# Patient Record
Sex: FEMALE | Race: WHITE | NOT HISPANIC OR LATINO | URBAN - METROPOLITAN AREA
[De-identification: names, ages, dates, MRNs, and addresses within clinical notes are randomized per-mention and may not be internally consistent; named-entity substitution may affect disease eponyms.]

---

## 2023-05-05 ENCOUNTER — TELEPHONE (OUTPATIENT)
Dept: OBGYN CLINIC | Facility: CLINIC | Age: 38
End: 2023-05-05

## 2023-05-05 NOTE — TELEPHONE ENCOUNTER
Patient called in she is going to be a new patient and was scheduled for abd pain due to h/o Endometriosis and ovarian cyst 05/08/23 w/ Michelle Falcon  However, she just took a HPT today and it came up Positive  She unsure her LMP is as her cycles been very irregular  She believes it was around 04/07/2023 (should be roughly around 4 wks)  She been having severe right sided pain and on the pain score she states it would be a 6 out of 10 at its worst  Denies vaginal spotting/bleeding  Advised her she need to be seen in the office today for further evaluation or the nearest ER  Pt declined as she can not come in today due to working in the [facility] [employer], and she does not want to go the ER (believe the pain is not severe to be seen there)  Encourage her to be seen in the ER as any unilateral pelvic pain can be indicator of possible Ectopic Pregnancy  She still declined and request an appointment on Monday as she will be available to be seen and aware that at that appointment can not guarantee viability if early in her pregnancy  Once again advised her that if she develop severe pelvic pain, and/or spotting/bleeding to please proceed to the nearest ER immediately  She verbalized understanding  Transferred her to  to schedule

## 2023-05-08 ENCOUNTER — OFFICE VISIT (OUTPATIENT)
Dept: OBGYN CLINIC | Facility: CLINIC | Age: 38
End: 2023-05-08

## 2023-05-08 VITALS
HEIGHT: 61 IN | WEIGHT: 165.2 LBS | DIASTOLIC BLOOD PRESSURE: 88 MMHG | BODY MASS INDEX: 31.19 KG/M2 | SYSTOLIC BLOOD PRESSURE: 132 MMHG

## 2023-05-08 DIAGNOSIS — N91.2 AMENORRHEA: Primary | ICD-10-CM

## 2023-05-08 DIAGNOSIS — R10.2 PELVIC PAIN: ICD-10-CM

## 2023-05-08 DIAGNOSIS — Z32.01 PREGNANCY TEST-POSITIVE: ICD-10-CM

## 2023-05-08 LAB — SL AMB POCT URINE HCG: ABNORMAL

## 2023-05-08 RX ORDER — ESOMEPRAZOLE MAGNESIUM 20 MG/1
FOR SUSPENSION ORAL
COMMUNITY

## 2023-05-08 NOTE — PROGRESS NOTES
Assessment/Plan:      Diagnoses and all orders for this visit:    Amenorrhea     -  Patient's menstrual history and lab results reviewed  Pregnancy test-positive  -     POCT urine HCG    Pelvic pain      -  Informed patient of normal findings on the U/S  Findings c/w IUP with EGA 6w5d  Advised patient to start PNV and schedule 1st PN appointment    Other orders  -     esomeprazole (NexIUM) 20 mg packet      FIRST TRIMESTER OBSTETRIC ULTRASOUND  Date of study: 5/8/2023  Performed by: Caesar Gaxiola DO     INDICATION: Amenorrhea, viability    COMPARISON: None  TECHNIQUE:   Transvaginal imaging was performed to assess the gestation, myometrial/endometrial architecture and ovarian parenchymal detail  The study includes volumetric sweeps and traditional still imaging technique  FINDINGS:     A single intrauterine gestation is identified  Cardiac activity is detected  YOLK SAC:  Present and normal in size and appearance  MEAN CROWN RUMP LENGTH:  8 4 mm = 6 weeks 5 days   AMNIOTIC FLUID/SAC SHAPE:  Within expected normal range  UTERUS/ADNEXA:   No adnexal mass or pathologic cyst   No free fluid identified  IMPRESSION:    Patient's last menstrual period was 04/04/2023 (approximate)  Gestational age based on LMP: 2w1d  Gestational age based on US: 11w9d    Will assign dating based on TVUS   Final Gestational age: 11w9d  Final Estimated Date of Delivery: 12/27/23  Fetal cardiac activity detected  No adnexal masses seen  Caesar Gaxiola DO  5/8/2023 2:20 PM       Subjective:     Patient ID: Joelle Asher is a 42 y o  female  Patient presents with partner with history of RLQ pain  Patient states she had beta HCG done 5/5/523 and RLQ pain has been intermittent since then  Denies having any vaginal bleeding  LMP was 4/4/23      Review of Systems   Constitutional: Negative for activity change and unexpected weight change     Genitourinary: Negative for dyspareunia, dysuria, vaginal bleeding, vaginal discharge and vaginal pain  Objective:     Physical Exam  Vitals and nursing note reviewed  HENT:      Head: Normocephalic  Abdominal:      General: There is no distension  Palpations: Abdomen is soft  There is no mass  Tenderness: There is no abdominal tenderness  There is no rebound  Genitourinary:     General: Normal vulva  Exam position: Lithotomy position  Labia:         Right: No rash, tenderness or lesion  Left: No rash, tenderness or lesion  Urethra: No urethral pain or urethral lesion  Vagina: Normal  No vaginal discharge  Cervix: No cervical motion tenderness, lesion or erythema  Uterus: Normal        Adnexa: Right adnexa normal and left adnexa normal       Rectum: No external hemorrhoid  Musculoskeletal:      Right lower leg: No edema  Left lower leg: No edema  Skin:     General: Skin is warm  Neurological:      Mental Status: She is alert and oriented to person, place, and time  Psychiatric:         Mood and Affect: Mood normal          Behavior: Behavior normal          Thought Content:  Thought content normal

## 2023-05-23 ENCOUNTER — PATIENT OUTREACH (OUTPATIENT)
Dept: OBGYN CLINIC | Facility: CLINIC | Age: 38
End: 2023-05-23

## 2023-05-23 ENCOUNTER — INITIAL PRENATAL (OUTPATIENT)
Dept: OBGYN CLINIC | Facility: CLINIC | Age: 38
End: 2023-05-23

## 2023-05-23 ENCOUNTER — INITIAL PRENATAL (OUTPATIENT)
Dept: OBGYN CLINIC | Facility: CLINIC | Age: 38
End: 2023-05-23
Payer: COMMERCIAL

## 2023-05-23 VITALS
SYSTOLIC BLOOD PRESSURE: 124 MMHG | HEIGHT: 61 IN | WEIGHT: 168 LBS | DIASTOLIC BLOOD PRESSURE: 72 MMHG | BODY MASS INDEX: 31.72 KG/M2

## 2023-05-23 DIAGNOSIS — Z36.89 ENCOUNTER FOR OTHER SPECIFIED ANTENATAL SCREENING: Primary | ICD-10-CM

## 2023-05-23 DIAGNOSIS — O99.210 OBESITY IN PREGNANCY: ICD-10-CM

## 2023-05-23 DIAGNOSIS — A60.09: Primary | ICD-10-CM

## 2023-05-23 DIAGNOSIS — O09.521 MULTIGRAVIDA OF ADVANCED MATERNAL AGE IN FIRST TRIMESTER: ICD-10-CM

## 2023-05-23 DIAGNOSIS — O98.311: Primary | ICD-10-CM

## 2023-05-23 DIAGNOSIS — Z13.71 TESTING FOR GENETIC DISEASE CARRIER STATUS: ICD-10-CM

## 2023-05-23 DIAGNOSIS — Z31.430 ENCOUNTER OF FEMALE FOR TESTING FOR GENETIC DISEASE CARRIER STATUS FOR PROCREATIVE MANAGEMENT: ICD-10-CM

## 2023-05-23 DIAGNOSIS — Z36.87 UNSURE OF LMP (LAST MENSTRUAL PERIOD) AS REASON FOR ULTRASOUND SCAN: ICD-10-CM

## 2023-05-23 DIAGNOSIS — Z34.91 PRENATAL CARE, FIRST TRIMESTER: ICD-10-CM

## 2023-05-23 PROBLEM — K21.9 GASTROESOPHAGEAL REFLUX IN PREGNANCY IN FIRST TRIMESTER: Status: ACTIVE | Noted: 2023-05-23

## 2023-05-23 PROBLEM — O99.611 GASTROESOPHAGEAL REFLUX IN PREGNANCY IN FIRST TRIMESTER: Status: ACTIVE | Noted: 2023-05-23

## 2023-05-23 LAB
EXTERNAL CHLAMYDIA SCREEN: NEGATIVE
EXTERNAL GONORRHEA SCREEN: NEGATIVE
SL AMB  POCT GLUCOSE, UA: NEGATIVE
SL AMB POCT URINE PROTEIN: NEGATIVE

## 2023-05-23 RX ORDER — CALCIUM CARBONATE 500 MG/1
1 TABLET, CHEWABLE ORAL DAILY
COMMUNITY

## 2023-05-23 NOTE — PROGRESS NOTES
OB INTAKE INTERVIEW  Patient is 42 y  o who presents for OB intake at 7wks  She is accompanied by: significant   The father of her baby Chanda Perez) is involved in the pregnancy    Last Menstrual Period: 2023  Ultrasound: Measured 9 weeks 1 days on 2023  Estimated Date of Delivery: 2023 confirmed by ultrasound  Signs/Symptoms of Pregnancy  Current pregnancy symptoms: nausea, vomiting, increased fatigue, breast tenderness   Constipation YES  Headaches YES  Cramping/spotting YES, cramping, faint spotting, cramping on right side   PICA cravings no    Diabetes-  Body mass index is 31 74 kg/m²  If patient has 1 or more, please order early 1 hour GTT  History of GDM no  BMI >30 YES  History of PCOS or current metformin use no  History of LGA/macrosomic infant (4000g/9lbs) no    If patient has 2 or more, please order early 1 hour GTT  AMA YES  First degree relative with type 2 diabetes no  History of chronic HTN, hyperlipidemia, elevated A1C no  High risk race (, , ,  or ) no    Hypertension- if you answer yes, please order preeclampsia labs (cbc, comprehensive metabolic panel, urine protein creatinine ratio, uric acid)  History of of chronic HTN no  History of gestational HTN no  History of preeclampsia, eclampsia, or HELLP syndrome no  History of diabetes no  History of lupus, autoimmune disease, kidney disease, antiphospholipid syndrome, rheumatoid arthritis, sjogren's syndrome no    Thyroid- if yes order TSH with reflex T4 (Unless TSH value on file within last 4 weeks then do not need to order)  History of thyroid disease no    Bleeding Disorder or Hx of DVT-patient or first degree relative with history of  Order the following if not done previously     (Factor V, antithrombin III, prothrombin gene mutation, protein C and S Ag, lupus anticoagulant, anticardiolipin, beta-2 glycoprotein)   no    OB/GYN-  History of abnormal pap smear YES  History of HPV no  History of Herpes/HSV YES  History of other STI (gonorrhea, chlamydia, trich) (or current partner with Hep B, Hep C, or HIV) no  History of prior  no  History of prior  no  History of  delivery prior to 36 weeks 6 days no  History of blood transfusion no  Ok for blood transfusion YES    Substance screening-   History of tobacco use no  Currently using tobacco no  Currently using alcohol no  Presently using drugs no  Past drug use (if yes, order urine drug screening panel)  no  IV drug use (if yes, order urine drug screening panel) no  Partner drug use (if yes, order urine drug screening panel) no  Parent/Family drug use (do not order urine drug screening panel just for family hx) no    Depression Screening-  PHQ-9 Score: (0)    MRSA Screening-   Does the pt have a hx of MRSA? no  If yes- please follow MRSA protocol and obtain a nasal swab for MRSA culture at 12 week visit  Immunizations:  Influenza vaccine given this season (ask date) 2022  Discussed Tdap vaccine (ask date) YES  Discussed COVID Vaccine (request pt upload card or bring to next visit) YES, covid 2022    Genetic/M-  Do you or your partner have a history of any of the following in yourselves or first degree relatives? Cystic fibrosis NO  Spinal muscular atrophy no  Hemoglobinopathy/Sickle Cell/Thalassemia no  Fragile X Intellectual Disability no    If yes, discuss carrier screening and recommend consultation with [facility]/genetic counseling  If no, discuss option for carrier screening and/or genetic testing with [facility]  Patient interested YES, patient is requesting cystic fibrosis, muscular atrophy testing  Appointment at [facility] made NO, patient will call to schedule appointment     Interview education  [facility] Pregnancy Essentials Book reviewed and discussed YES      Prenatal lab work scripts YES    Extra labs ordered:  Early 1 hr gtt       The patient has a history now or in prior pregnancy notable for: GERD, IBS, Endometriosis and cysts, genital Herpes 1      Details that I feel the provider should be aware of: GERD, IBS, Endometriosis and cysts, genital Herpes 1    The patient was oriented to our practice, reviewed delivering physicians and [facility]  All questions were answered      Interviewed by: Swati Grant

## 2023-05-23 NOTE — PROGRESS NOTES
"SW referred for initial prenatal assessment  Patient is Verónica Edmonds  SW met with patient and FOB Mathieu Pierce who were agreeable to completing assessment today  Patient reports this pregnancy was a \"planned surprise\" - due to h/o endometriosis, patient was unsure if she was able to get pregnant  Both patient and FOB are very excited to becoming parents  Patient and FOB recently moved to the area from [place] - bought a house in [place]  They have not told all of their family about the baby yet - planning to wait until patient's 38th birthday celebration to announce  Family/friends they have told already are supportive  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[place] and FOB works for [employer]  FOB is main  - patient working on obtaining her license  Patient states her relationship with FOB is good and she is well supported by his family, her family, and friends  Patient states her mood as been well mostly - does feel anxious about experiencing depression/anxiety during pregnancy d/t hormones and patient's history of mental health  Patient reports she has an OOP therapist, Carmen Zavala, who she plans to get back in touch with to schedule an appointment  Patient previously saw Veda 1x a month, but has been managing well recently so have been communicating via e-mail/text mostly  Denies SI/HI  Has not been on medication for several years - feels it is not needed at this time  Patient reports she enjoys talking things out with FOB, spending time with her dogs, turning off her phone and gardening when stressed  Denies substance use and legal concerns  Patient reports being in foster care/adopted at a very young age (involved with [employer]) - denies current or history of DV/IPV  Also denies CYS involvement   Patient and FOB expressed interest in some parenting education/resources - SW sent information from [facility]'s Parent/Infant Resource List, and [employer]'s " flyer to both patient and FOB ([email or web address]) via e-mail  No additional needs identified at this time; SW provided patient with contact information for future reference  SW closing consult, please re-refer as needed

## 2023-05-24 LAB
C TRACH RRNA SPEC QL NAA+PROBE: NOT DETECTED
N GONORRHOEA RRNA SPEC QL NAA+PROBE: NOT DETECTED

## 2023-05-30 ENCOUNTER — TELEPHONE (OUTPATIENT)
Dept: OBGYN CLINIC | Facility: CLINIC | Age: 38
End: 2023-05-30

## 2023-05-30 NOTE — TELEPHONE ENCOUNTER
Jonny Powell called asking if our office offers a different version of the 1hr glucose available at [address] [place] [address]  It is supposed to be a more natural way of screening for diabetes and does not cause the same side effect of glucola  Advised this office typically does not order this test but will forward to provider to review and obtain recommendation    Dr Yesika Stock, please advise

## 2023-05-31 ENCOUNTER — TELEPHONE (OUTPATIENT)
Dept: OBGYN CLINIC | Facility: CLINIC | Age: 38
End: 2023-05-31

## 2023-05-31 ENCOUNTER — TELEPHONE (OUTPATIENT)
Facility: HOSPITAL | Age: 38
End: 2023-05-31

## 2023-05-31 NOTE — TELEPHONE ENCOUNTER
Spoke with PT 5/31 regarding her 6/26 appt  PT wanted to r/s appt to a different day, but we are unable to get her in another day  We moved the appt to a later time in the same day and PT verbalized understanding  PT also asked if she needed to have her glucose test done prior to the appt and I told her she did not have to  We recommend having it done prior to seeing MFM, But if she is unable then we will still see her for her appt as normal  PT verbalized understanding of all information and was very thankful

## 2023-05-31 NOTE — TELEPHONE ENCOUNTER
Pt's significant other Adwoa Nieto called to question timing 1 hr gtt needs to be done  Advised we like to have testing done within 2 weeks after initial PN visit but definitely would like to have done prior to next OB appointment on  6/26/23  Pt is leaving to go out of town from 6/9/23-6/25/23  Patient has an appointment on 6/9/23 to have her First PN blood work drawn however the lab may not be able to schedule gtt  Adwoa Nieto will confirm if testing can be done on 6/9/23 other wise will have done upon return from vacation

## 2023-06-05 ENCOUNTER — TELEPHONE (OUTPATIENT)
Dept: OBGYN CLINIC | Facility: CLINIC | Age: 38
End: 2023-06-05

## 2023-06-05 NOTE — TELEPHONE ENCOUNTER
iJn Zuñiga left a message that Jesica Collazo is c/o increased right side abdominal pain,starting below ribs going to her crotch/hips  He said the pain worsen with coughing & moving  Jesica Collazo is 9 weeks  Left a message on Toshia's voice mail to call back

## 2023-06-05 NOTE — TELEPHONE ENCOUNTER
ΣΑΡΑΝΤΙ states having more right side flank pain;pain waxes & wanes  Denies any vaginal bleeding or hematuria  She said did have work up for Gallbladder just recently;results negative for gall stones  Informed pt to follow up with her PCP,may have kidney stones

## 2023-06-07 LAB
EXTERNAL ANTIBODY SCREEN: NORMAL
EXTERNAL HEMOGLOBIN: 13.4 G/DL
EXTERNAL HEPATITIS B SURFACE ANTIGEN: NEGATIVE
EXTERNAL HIV-1/2 AB-AG: NORMAL
EXTERNAL PLATELET COUNT: 219 K/ÂΜL
EXTERNAL RH FACTOR: POSITIVE
EXTERNAL RUBELLA IGG QUANTITATION: NORMAL
EXTERNAL SYPHILIS TOTAL IGG/IGM SCREENING: NORMAL

## 2023-06-08 LAB
ABO GROUP BLD: NORMAL
APPEARANCE UR: CLEAR
BACTERIA UR QL AUTO: NORMAL /HPF
BASOPHILS # BLD AUTO: 22 CELLS/UL (ref 0–200)
BASOPHILS NFR BLD AUTO: 0.3 %
BILIRUB UR QL STRIP: NEGATIVE
BLD GP AB SCN SERPL QL: NORMAL
COLOR UR: YELLOW
EOSINOPHIL # BLD AUTO: 37 CELLS/UL (ref 15–500)
EOSINOPHIL NFR BLD AUTO: 0.5 %
ERYTHROCYTE [DISTWIDTH] IN BLOOD BY AUTOMATED COUNT: 12.9 % (ref 11–15)
GLUCOSE UR QL STRIP: NEGATIVE
HBV SURFACE AG SERPL QL IA: NORMAL
HCT VFR BLD AUTO: 38.8 % (ref 35–45)
HCV AB S/CO SERPL IA: 0.03
HCV AB SERPL QL IA: NORMAL
HGB BLD-MCNC: 13.4 G/DL (ref 11.7–15.5)
HGB UR QL STRIP: NEGATIVE
HIV 1+2 AB+HIV1 P24 AG SERPL QL IA: NORMAL
HYALINE CASTS #/AREA URNS LPF: NORMAL /LPF
KETONES UR QL STRIP: NEGATIVE
LEUKOCYTE ESTERASE UR QL STRIP: NEGATIVE
LYMPHOCYTES # BLD AUTO: 1256 CELLS/UL (ref 850–3900)
LYMPHOCYTES NFR BLD AUTO: 17.2 %
MCH RBC QN AUTO: 30.2 PG (ref 27–33)
MCHC RBC AUTO-ENTMCNC: 34.5 G/DL (ref 32–36)
MCV RBC AUTO: 87.4 FL (ref 80–100)
MONOCYTES # BLD AUTO: 650 CELLS/UL (ref 200–950)
MONOCYTES NFR BLD AUTO: 8.9 %
NEUTROPHILS # BLD AUTO: 5336 CELLS/UL (ref 1500–7800)
NEUTROPHILS NFR BLD AUTO: 73.1 %
NITRITE UR QL STRIP: NEGATIVE
PH UR STRIP: 6.5 [PH] (ref 5–8)
PLATELET # BLD AUTO: 219 THOUSAND/UL (ref 140–400)
PMV BLD REES-ECKER: 11.1 FL (ref 7.5–12.5)
PROT UR QL STRIP: NEGATIVE
RBC # BLD AUTO: 4.44 MILLION/UL (ref 3.8–5.1)
RBC #/AREA URNS HPF: NORMAL /HPF
RH BLD: NORMAL
RPR SER QL: NORMAL
RUBV IGG SERPL IA-ACNC: 5.66 INDEX
SP GR UR STRIP: 1 (ref 1–1.03)
SQUAMOUS #/AREA URNS HPF: NORMAL /HPF
WBC # BLD AUTO: 7.3 THOUSAND/UL (ref 3.8–10.8)
WBC #/AREA URNS HPF: NORMAL /HPF

## 2023-06-13 LAB
ABO GROUP BLD: NORMAL
APPEARANCE UR: CLEAR
BACTERIA UR QL AUTO: NORMAL /HPF
BASOPHILS # BLD AUTO: 22 CELLS/UL (ref 0–200)
BASOPHILS NFR BLD AUTO: 0.3 %
BILIRUB UR QL STRIP: NEGATIVE
BLD GP AB SCN SERPL QL: NORMAL
COLOR UR: YELLOW
EOSINOPHIL # BLD AUTO: 37 CELLS/UL (ref 15–500)
EOSINOPHIL NFR BLD AUTO: 0.5 %
ERYTHROCYTE [DISTWIDTH] IN BLOOD BY AUTOMATED COUNT: 12.9 % (ref 11–15)
GLUCOSE UR QL STRIP: NEGATIVE
HBV SURFACE AG SERPL QL IA: NORMAL
HCT VFR BLD AUTO: 38.8 % (ref 35–45)
HCV AB S/CO SERPL IA: 0.03
HCV AB SERPL QL IA: NORMAL
HGB BLD-MCNC: 13.4 G/DL (ref 11.7–15.5)
HGB UR QL STRIP: NEGATIVE
HIV 1+2 AB+HIV1 P24 AG SERPL QL IA: NORMAL
HYALINE CASTS #/AREA URNS LPF: NORMAL /LPF
INTERPRETATION: NORMAL
KETONES UR QL STRIP: NEGATIVE
LEUKOCYTE ESTERASE UR QL STRIP: NEGATIVE
LYMPHOCYTES # BLD AUTO: 1256 CELLS/UL (ref 850–3900)
LYMPHOCYTES NFR BLD AUTO: 17.2 %
MCH RBC QN AUTO: 30.2 PG (ref 27–33)
MCHC RBC AUTO-ENTMCNC: 34.5 G/DL (ref 32–36)
MCV RBC AUTO: 87.4 FL (ref 80–100)
MONOCYTES # BLD AUTO: 650 CELLS/UL (ref 200–950)
MONOCYTES NFR BLD AUTO: 8.9 %
NEUTROPHILS # BLD AUTO: 5336 CELLS/UL (ref 1500–7800)
NEUTROPHILS NFR BLD AUTO: 73.1 %
NITRITE UR QL STRIP: NEGATIVE
PH UR STRIP: 6.5 [PH] (ref 5–8)
PLATELET # BLD AUTO: 219 THOUSAND/UL (ref 140–400)
PMV BLD REES-ECKER: 11.1 FL (ref 7.5–12.5)
PROT UR QL STRIP: NEGATIVE
RBC # BLD AUTO: 4.44 MILLION/UL (ref 3.8–5.1)
RBC #/AREA URNS HPF: NORMAL /HPF
RH BLD: NORMAL
RPR SER QL: NORMAL
RUBV IGG SERPL IA-ACNC: 5.66 INDEX
SMA 2+0 RISK VARIANT: NOT DETECTED
SMN1 GENE MUT ANL BLD/T: NORMAL
SP GR UR STRIP: 1 (ref 1–1.03)
SQUAMOUS #/AREA URNS HPF: NORMAL /HPF
TECHNICAL RESULTS: NEGATIVE
WBC # BLD AUTO: 7.3 THOUSAND/UL (ref 3.8–10.8)
WBC #/AREA URNS HPF: NORMAL /HPF

## 2023-06-26 ENCOUNTER — ROUTINE PRENATAL (OUTPATIENT)
Dept: PERINATAL CARE | Facility: OTHER | Age: 38
End: 2023-06-26
Payer: COMMERCIAL

## 2023-06-26 VITALS
HEIGHT: 61 IN | BODY MASS INDEX: 33.12 KG/M2 | DIASTOLIC BLOOD PRESSURE: 68 MMHG | SYSTOLIC BLOOD PRESSURE: 118 MMHG | HEART RATE: 91 BPM | WEIGHT: 175.4 LBS

## 2023-06-26 DIAGNOSIS — K21.9 GASTROESOPHAGEAL REFLUX IN PREGNANCY IN FIRST TRIMESTER: Primary | ICD-10-CM

## 2023-06-26 DIAGNOSIS — O99.210 OBESITY IN PREGNANCY: ICD-10-CM

## 2023-06-26 DIAGNOSIS — Z13.79 GENETIC SCREENING: ICD-10-CM

## 2023-06-26 DIAGNOSIS — Z3A.14 14 WEEKS GESTATION OF PREGNANCY: ICD-10-CM

## 2023-06-26 DIAGNOSIS — Z36.89 ENCOUNTER FOR OTHER SPECIFIED ANTENATAL SCREENING: ICD-10-CM

## 2023-06-26 DIAGNOSIS — O99.611 GASTROESOPHAGEAL REFLUX IN PREGNANCY IN FIRST TRIMESTER: Primary | ICD-10-CM

## 2023-06-26 PROCEDURE — 99243 OFF/OP CNSLTJ NEW/EST LOW 30: CPT | Performed by: OBSTETRICS & GYNECOLOGY

## 2023-06-26 PROCEDURE — 36415 COLL VENOUS BLD VENIPUNCTURE: CPT | Performed by: OBSTETRICS & GYNECOLOGY

## 2023-06-26 PROCEDURE — 76805 OB US >/= 14 WKS SNGL FETUS: CPT | Performed by: OBSTETRICS & GYNECOLOGY

## 2023-06-26 RX ORDER — ACETAMINOPHEN 325 MG/1
650 TABLET ORAL EVERY 6 HOURS PRN
COMMUNITY

## 2023-06-26 RX ORDER — FAMOTIDINE 10 MG
10 TABLET ORAL 2 TIMES DAILY
COMMUNITY

## 2023-06-26 NOTE — PROGRESS NOTES
A fetal ultrasound was completed  See Ob procedures in Epic for an interpretation and recommendations  Do not hesitate to contact us in [facility] if you have questions  Vishal Hogan MD, [address] [facility]  Maternal Fetal Medicine

## 2023-06-26 NOTE — LETTER
"June 26, 2023     [address]  [address] [address]  [place]    Patient: Alejandra Matthews   YOB: 1985   Date of Visit: 6/26/2023       Dear Dr Lupillo Leonard: Thank you for referring Alejandra Matthews to me for evaluation  Below are my notes for this consultation  If you have questions, please do not hesitate to call me  I look forward to following your patient along with you  Sincerely,        Fili Gutierrez MD        CC: No Recipients    Fili Gutierrez MD  6/26/2023  6:51 PM  Sign when Signing Visit  A fetal ultrasound was completed  See Ob procedures in Epic for an interpretation and recommendations  Do not hesitate to contact us in [facility] if you have questions  Whit Villalba MD, MSCE  Maternal Fetal Medicine      Ayo Cope  6/26/2023  6:31 PM  Sign when Signing Visit  Patient chose to have [facility] Non-invasive Prenatal Screen with fetal sex  Patient given brochure and is aware [facility] will contact their insurance and coordinate coverage  Patient made aware she will need to respond to text message or e-mail from [employer] within 2 business days or testing will be run through insurance  Patient informed text message will come from area code  \"415\"  Provided [employer] # [phone] and web site : [email or web address]  \"Deming your test online\" card with barcode and test tube ID provided to patient  Reviewed [facility]'s web site states 5-7 business days for results via their portal    [employer] message will be sent to patient when [facility] receives results /provider reviews  2 vials of blood drawn from left arm by Ayo Cope MA  Patient tolerated blood draw without difficulty  Specimens labeled with patient identifiers (name, date of birth, specimen collection date), order and specimen were verified with patient, packed and sent via [email or web address]  FED EX  tracking #  [id number]  Copy of lab order scanned to Epic media      Maternal " Fetal Medicine will have results in approximately 7-10 business days and will call patient or notify via [address]  Patient aware viewing lab result online will reveal fetal sex if ordered  Patient verbalized understanding of all instructions and no questions at this time

## 2023-06-26 NOTE — PROGRESS NOTES
"Patient chose to have [facility] Non-invasive Prenatal Screen with fetal sex  Patient given brochure and is aware [facility] will contact their insurance and coordinate coverage  Patient made aware she will need to respond to text message or e-mail from [employer] within 2 business days or testing will be run through insurance  Patient informed text message will come from area code  \"415\"  Provided [employer] # [phone] and web site : [email or web address]  \"Bangs your test online\" card with barcode and test tube ID provided to patient  Reviewed [facility]'s web site states 5-7 business days for results via their portal    [employer] message will be sent to patient when MFM receives results /provider reviews  2 vials of blood drawn from left arm by Mary Curiel MA  Patient tolerated blood draw without difficulty  Specimens labeled with patient identifiers (name, date of birth, specimen collection date), order and specimen were verified with patient, packed and sent via [employer]  FED EX  tracking #  [id number]  Copy of lab order scanned to Epic media  Maternal Fetal Medicine will have results in approximately 7-10 business days and will call patient or notify via [address]  Patient aware viewing lab result online will reveal fetal sex if ordered  Patient verbalized understanding of all instructions and no questions at this time      "

## 2023-06-27 LAB — GLUCOSE 1H P 50 G GLC PO SERPL-MCNC: 76 MG/DL (ref 70–139)

## 2023-06-29 PROBLEM — Z3A.15 15 WEEKS GESTATION OF PREGNANCY: Status: ACTIVE | Noted: 2023-06-26

## 2023-06-29 PROBLEM — O09.522 MULTIGRAVIDA OF ADVANCED MATERNAL AGE IN SECOND TRIMESTER: Status: ACTIVE | Noted: 2023-05-23

## 2023-06-29 PROBLEM — O09.512 PRIMIGRAVIDA OF ADVANCED MATERNAL AGE IN SECOND TRIMESTER: Status: ACTIVE | Noted: 2023-05-23

## 2023-06-29 PROBLEM — O98.312 GENITAL HERPES AFFECTING PREGNANCY IN SECOND TRIMESTER: Status: ACTIVE | Noted: 2023-05-23

## 2023-06-29 PROBLEM — O99.212 OBESITY AFFECTING PREGNANCY IN SECOND TRIMESTER: Status: ACTIVE | Noted: 2023-06-29

## 2023-06-29 NOTE — PATIENT INSTRUCTIONS
- Continue Prenatal vitamin and all prescribed medications  - Try to drink 64 oz of water or other non-caffeinated fluids daily  - Avoid laying flat on your back for more than a few minutes for exercise or sleep  Tilted to right or left side is fine   - Call office if leaking fluid, bleeding, or contractions >5-6/hour which don't decrease with rest, oral hydration, or emptying bladder  To decrease risk of Preeclampsia in pregnancy - please take baby aspirin (162mg) daily at night from 12 weeks until 36 weeks of pregnancy

## 2023-06-30 ENCOUNTER — ROUTINE PRENATAL (OUTPATIENT)
Dept: OBGYN CLINIC | Facility: CLINIC | Age: 38
End: 2023-06-30

## 2023-06-30 VITALS
SYSTOLIC BLOOD PRESSURE: 102 MMHG | HEIGHT: 61 IN | BODY MASS INDEX: 33.15 KG/M2 | WEIGHT: 175.6 LBS | DIASTOLIC BLOOD PRESSURE: 70 MMHG

## 2023-06-30 DIAGNOSIS — O09.512 PRIMIGRAVIDA OF ADVANCED MATERNAL AGE IN SECOND TRIMESTER: Primary | ICD-10-CM

## 2023-06-30 DIAGNOSIS — Z3A.15 15 WEEKS GESTATION OF PREGNANCY: ICD-10-CM

## 2023-06-30 DIAGNOSIS — R10.11 RUQ PAIN: ICD-10-CM

## 2023-06-30 DIAGNOSIS — Z36.1 NEED FOR MATERNAL SERUM ALPHA-PROTEIN (MSAFP) SCREENING: ICD-10-CM

## 2023-06-30 DIAGNOSIS — K21.9 GASTROESOPHAGEAL REFLUX DURING PREGNANCY IN SECOND TRIMESTER, ANTEPARTUM: ICD-10-CM

## 2023-06-30 DIAGNOSIS — O99.612 GASTROESOPHAGEAL REFLUX DURING PREGNANCY IN SECOND TRIMESTER, ANTEPARTUM: ICD-10-CM

## 2023-06-30 DIAGNOSIS — O98.312 GENITAL HERPES AFFECTING PREGNANCY IN SECOND TRIMESTER: ICD-10-CM

## 2023-06-30 DIAGNOSIS — A60.09 GENITAL HERPES AFFECTING PREGNANCY IN SECOND TRIMESTER: ICD-10-CM

## 2023-06-30 NOTE — PROGRESS NOTES
Routine Prenatal Visit  [address] [facility], [address], [facility], [address]    Assessment/Plan:  Becky Ceja is a 34y o  year old  at 9w1d who presents for routine prenatal visit  1  Genital herpes complicating pregnancy, antepartum, first trimester    2  Multigravida of advanced maternal age in first trimester    3  Unsure of LMP (last menstrual period) as reason for ultrasound scan  -     AMB US OB < 14 weeks single or first gestation level 1          Subjective:     CC: Prenatal care    Ronald Kent is a 34 y o   female who presents for routine prenatal care at 9w1d  Pregnancy ROS: no leakage of fluid, pelvic pain, or vaginal bleeding  no fetal movement  The following portions of the patient's history were reviewed and updated as appropriate: allergies, current medications, past family history, past medical history, obstetric history, gynecologic history, past social history, past surgical history and problem list       Objective:  LMP 2023 (Approximate)   Pregravid Weight/BMI: 72 6 kg (160 lb) (BMI 30 25)  Current Weight:     Total Weight Gain: 3 629 kg (8 lb)        General: Well appearing, no distress  Respiratory: Unlabored breathing  Cardiovascular: Regular rate  Abdomen: Soft, gravid, nontender  Fundal Height: Appropriate for gestational age  Extremities: Warm and well perfused  Non tender

## 2023-06-30 NOTE — PROGRESS NOTES
"Routine Prenatal Visit  [address]   [address], [facility], [facility]    Assessment/Plan:  Jolanta Smith is a 34y o  year old  at 15w3d who presents for routine prenatal visit  3  Primigravida of advanced maternal age in second trimester  Assessment & Plan:  Recom start ASA daily until 36 weeks  2  Genital herpes affecting pregnancy in second trimester    3  Gastroesophageal reflux during pregnancy in second trimester, antepartum  Assessment & Plan:  Improved with Tums and Pepcid      4  RUQ pain  Comments:  over last week  Discussed pregnancy still low in pelvis  Will check liver function tests  If normal pain likely muskuloskeletal   Orders:  -     Hepatic function panel; Future  -     Hepatic function panel    5  Need for maternal serum alpha-protein (MSAFP) screening  -     Alpha fetoprotein, maternal; Future  -     Alpha fetoprotein, maternal    6  15 weeks gestation of pregnancy      Next OB Visit 4 weeks  Subjective:     CC: Prenatal care    Josephine Shoemaker is a 34 y o  Laray Dannie female who presents for routine prenatal care at 15w3d  Pregnancy ROS: no leakage of fluid, pelvic pain, or vaginal bleeding  no fetal movement      The following portions of the patient's history were reviewed and updated as appropriate: allergies, current medications, past family history, past medical history, obstetric history, gynecologic history, past social history, past surgical history and problem list       Objective:  /70   Ht 5' 1\" (1 549 m)   Wt 79 7 kg (175 lb 9 6 oz)   LMP 2023 (Approximate)   BMI 33 18 kg/m²   Pregravid Weight/BMI: 72 6 kg (160 lb) (BMI 30 25)  Current Weight: 79 7 kg (175 lb 9 6 oz)   Total Weight Gain: 7 076 kg (15 lb 9 6 oz)   Pre-Iraida Vitals    Flowsheet Row Most Recent Value   Prenatal Assessment    Fetal Heart Rate 145   Fundal Height (cm) 15 cm   Movement Present   Prenatal Vitals    Blood Pressure 102/70   Weight - Scale 79 7 kg " (175 lb 9 6 oz)   Urine Albumin/Glucose    Dilation/Effacement/Station    Vaginal Drainage    Edema    LLE Edema Trace   RLE Edema Trace   Facial Edema None           General: Well appearing, no distress  Abdomen: Soft, gravid, nontender  Extremities: Non tender

## 2023-07-11 LAB
ALBUMIN SERPL-MCNC: 3.8 G/DL (ref 3.9–4.9)
ALP SERPL-CCNC: 49 IU/L (ref 44–121)
ALT SERPL-CCNC: 17 IU/L (ref 0–32)
AST SERPL-CCNC: 20 IU/L (ref 0–40)
BILIRUB DIRECT SERPL-MCNC: <0.1 MG/DL (ref 0–0.4)
BILIRUB SERPL-MCNC: <0.2 MG/DL (ref 0–1.2)
PROT SERPL-MCNC: 5.9 G/DL (ref 6–8.5)

## 2023-07-12 LAB
2ND TRIMESTER 4 SCREEN SERPL-IMP: NORMAL
AFP ADJ MOM SERPL: 0.81
AFP INTERP AMN-IMP: NORMAL
AFP INTERP SERPL-IMP: NORMAL
AFP INTERP SERPL-IMP: NORMAL
AFP SERPL-MCNC: 28.4 NG/ML
AGE AT DELIVERY: 40.5 YR
GA METHOD: NORMAL
GA: 16.9 WEEKS
IDDM PATIENT QL: NO
MULTIPLE PREGNANCY: NO
NEURAL TUBE DEFECT RISK FETUS: NORMAL %

## 2023-07-25 ENCOUNTER — TELEPHONE (OUTPATIENT)
Dept: OBGYN CLINIC | Facility: CLINIC | Age: 38
End: 2023-07-25

## 2023-07-25 ENCOUNTER — HOSPITAL ENCOUNTER (OUTPATIENT)
Dept: NON INVASIVE DIAGNOSTICS | Facility: HOSPITAL | Age: 38
Discharge: HOME | End: 2023-07-25
Attending: OBSTETRICS & GYNECOLOGY
Payer: COMMERCIAL

## 2023-07-25 DIAGNOSIS — O22.92 THROMBOSIS AFFECTING PREGNANCY IN SECOND TRIMESTER, ANTEPARTUM: Primary | ICD-10-CM

## 2023-07-25 DIAGNOSIS — O22.92 THROMBOSIS AFFECTING PREGNANCY IN SECOND TRIMESTER, ANTEPARTUM: ICD-10-CM

## 2023-07-25 PROCEDURE — 93971 EXTREMITY STUDY: CPT

## 2023-07-25 PROCEDURE — 93971 EXTREMITY STUDY: CPT | Performed by: SURGERY

## 2023-07-25 NOTE — TELEPHONE ENCOUNTER
Mel Keith called back, Left leg has increased swelling than right leg. The right leg swelling will decrease with elevation but not the left leg. She is having some discomfort in left leg too. Informed Toshia to get Venous Doppler testing today to rule out DVT. This nurse spoke with Stefan Kay scheduling dept. To get Toshia a STAT appt. Ferdinadn Olmstead, was able to get Toshia an appt today @ 1:30 pm at [address] [place] [address]. . Pt & her spouse aware of appt & location. Will Como Text Dr. Neville Garcia.

## 2023-07-25 NOTE — TELEPHONE ENCOUNTER
Return call from Zandra Contreras, B/P 113/69. Swelling left ankle has increased. L/M for patient to return call to discuss concern of ankle swelling.

## 2023-07-25 NOTE — TELEPHONE ENCOUNTER
Patient left portal message and was called. Patient reports pins and needles and numbness in hands and significant swelling in legs last night. Patient reports she was on her feet all day with working at her job, moving to a new house, and not eating proper meals. She denies any bleeding, LOF, blurry vision, or upper right quadrant pain. She reports swelling decreasing this morning upon waking up. Patient reported she will be staying home from work today and was advised to wear compression socks and/or wrist compression sleeve if possible, avoid salt intake in diet, and to elevate legs when possible. Also advised to increase water intake to at least 64 ounces per day. Patient to call office back when she obtains a blood pressure cuff with a BP reading. Patient verbalizes understanding and appreciated phone call. linda Carr for her appointment with you on Friday, 7/27/23.

## 2023-07-25 NOTE — TELEPHONE ENCOUNTER
If one leg is significantly more swollen than the other, please order lower extremity doppler to rule out DVT.

## 2023-07-27 PROBLEM — Z3A.19 19 WEEKS GESTATION OF PREGNANCY: Status: ACTIVE | Noted: 2023-06-26

## 2023-07-28 ENCOUNTER — ROUTINE PRENATAL (OUTPATIENT)
Dept: OBGYN CLINIC | Facility: CLINIC | Age: 38
End: 2023-07-28
Payer: COMMERCIAL

## 2023-07-28 VITALS
SYSTOLIC BLOOD PRESSURE: 118 MMHG | DIASTOLIC BLOOD PRESSURE: 62 MMHG | BODY MASS INDEX: 33.95 KG/M2 | WEIGHT: 179.8 LBS | HEIGHT: 61 IN

## 2023-07-28 DIAGNOSIS — O09.512 PRIMIGRAVIDA OF ADVANCED MATERNAL AGE IN SECOND TRIMESTER: Primary | ICD-10-CM

## 2023-07-28 DIAGNOSIS — Z3A.19 19 WEEKS GESTATION OF PREGNANCY: ICD-10-CM

## 2023-07-28 DIAGNOSIS — O99.212 OBESITY AFFECTING PREGNANCY IN SECOND TRIMESTER: ICD-10-CM

## 2023-07-28 LAB
SL AMB  POCT GLUCOSE, UA: NEGATIVE
SL AMB POCT URINE PROTEIN: NEGATIVE

## 2023-07-28 PROCEDURE — 81002 URINALYSIS NONAUTO W/O SCOPE: CPT | Performed by: OBSTETRICS & GYNECOLOGY

## 2023-07-28 PROCEDURE — PNV: Performed by: OBSTETRICS & GYNECOLOGY

## 2023-07-28 RX ORDER — ASPIRIN 81 MG/1
2 TABLET ORAL DAILY
COMMUNITY
Start: 2023-06-30

## 2023-07-28 NOTE — PROGRESS NOTES
Routine Prenatal Visit  [address]  [address] [facility], [address], [place], [address] [facility]    Assessment/Plan:   Germán Sawyer is a 34y.o. year old  at 19w3d who presents for routine prenatal visit. 3. Primigravida of advanced maternal age in second trimester  Assessment & Plan:  Continue ASA. Level II anatomy u/s next week. 2. Obesity affecting pregnancy in second trimester    3. 19 weeks gestation of pregnancy  Assessment & Plan:  Encouraged Magnesium for leg cramps/restless leg, carpal tunnel splints for hand numbness. Exam normal but may be developing umbilical hernia - reviewed signs of concern. Reviewed possible physiologic tachycardia of pregnancy and to monitor. Orders:  -     POCT urine dip        Next OB Visit 4 weeks. Subjective:     CC: Prenatal care    Nakul Coffey is a 34 y.o. Hal Jabs female who presents for routine prenatal care at 19w3d. Pregnancy ROS: no leakage of fluid, pelvic pain, or vaginal bleeding. normal fetal movement.     The following portions of the patient's history were reviewed and updated as appropriate: allergies, current medications, past family history, past medical history, obstetric history, gynecologic history, past social history, past surgical history and problem list.      Objective:  /62   Ht 5' 1" (1.549 m)   Wt 81.6 kg (179 lb 12.8 oz)   LMP 2023 (Approximate)   BMI 33.97 kg/m²   Pregravid Weight/BMI: 72.6 kg (160 lb) (BMI 30.25)  Current Weight: 81.6 kg (179 lb 12.8 oz)   Total Weight Gain: 8.981 kg (19 lb 12.8 oz)   Pre-Iraida Vitals    Flowsheet Row Most Recent Value   Prenatal Assessment    Fetal Heart Rate 140   Fundal Height (cm) 19 cm   Movement Present   Prenatal Vitals    Blood Pressure 118/62   Weight - Scale 81.6 kg (179 lb 12.8 oz)   Urine Albumin/Glucose    Dilation/Effacement/Station    Vaginal Drainage    Edema    LLE Edema Trace   RLE Edema Trace   Facial Edema None           General: Well appearing, no distress  Abdomen: Soft, gravid, nontender  Extremities: Non tender.

## 2023-07-28 NOTE — PATIENT INSTRUCTIONS
- Continue Prenatal vitamin and all prescribed medications. - Try to drink 64 oz of water or other non-caffeinated fluids daily. - Avoid laying flat on your back for more than a few minutes for exercise or sleep. Tilted to right or left side is fine.  - Call office if leaking fluid, bleeding, or contractions >5-6/hour which don't decrease with rest, oral hydration, or emptying bladder.

## 2023-07-28 NOTE — ASSESSMENT & PLAN NOTE
Encouraged Magnesium for leg cramps/restless leg, carpal tunnel splints for hand numbness. Exam normal but may be developing umbilical hernia - reviewed signs of concern. Reviewed possible physiologic tachycardia of pregnancy and to monitor.

## 2023-08-04 ENCOUNTER — ROUTINE PRENATAL (OUTPATIENT)
Dept: PERINATAL CARE | Facility: OTHER | Age: 38
End: 2023-08-04
Payer: COMMERCIAL

## 2023-08-04 VITALS
HEIGHT: 61 IN | SYSTOLIC BLOOD PRESSURE: 104 MMHG | DIASTOLIC BLOOD PRESSURE: 62 MMHG | HEART RATE: 91 BPM | BODY MASS INDEX: 33.87 KG/M2 | WEIGHT: 179.4 LBS

## 2023-08-04 DIAGNOSIS — O99.212 OBESITY AFFECTING PREGNANCY IN SECOND TRIMESTER: ICD-10-CM

## 2023-08-04 DIAGNOSIS — Z36.3 ENCOUNTER FOR ANTENATAL SCREENING FOR MALFORMATIONS: ICD-10-CM

## 2023-08-04 DIAGNOSIS — Z3A.20 20 WEEKS GESTATION OF PREGNANCY: ICD-10-CM

## 2023-08-04 DIAGNOSIS — O09.512 PRIMIGRAVIDA OF ADVANCED MATERNAL AGE IN SECOND TRIMESTER: Primary | ICD-10-CM

## 2023-08-04 DIAGNOSIS — Z36.86 ENCOUNTER FOR ANTENATAL SCREENING FOR CERVICAL LENGTH: ICD-10-CM

## 2023-08-04 PROCEDURE — 76817 TRANSVAGINAL US OBSTETRIC: CPT | Performed by: OBSTETRICS & GYNECOLOGY

## 2023-08-04 PROCEDURE — 76811 OB US DETAILED SNGL FETUS: CPT | Performed by: OBSTETRICS & GYNECOLOGY

## 2023-08-04 RX ORDER — CALCIUM CARBONATE 500 MG/1
2 TABLET, CHEWABLE ORAL AS NEEDED
COMMUNITY

## 2023-08-04 NOTE — PROGRESS NOTES
Ultrasound Probe Disinfection    A transvaginal ultrasound was performed. Prior to use, disinfection was performed with High Level Disinfection Process ([employer]aulton). Probe serial number [id number] was used.       Shauna Sutton  08/04/23  2:29 PM

## 2023-08-04 NOTE — LETTER
August 4, 2023     Libia Paige, [address] [place] [address]  [address] [facility]    Patient: Jaden Loving   YOB: 1985   Date of Visit: 8/4/2023       Dear Dr. Luis Crenshaw: Thank you for referring Jaden Loving to me for evaluation. Below are my notes for this consultation. If you have questions, please do not hesitate to call me. I look forward to following your patient along with you. Sincerely,        Bev Robert MD        CC: No Recipients    Bev Robetr MD  8/4/2023  6:39 PM  Sign when Signing Visit  Jaden Loving  has no complaints today at 20w3d. She reports fetal movements and does not report any vaginal bleeding or signs of labor. Her recently completed fetal testing revealed a normal NIPT, MSAFP and Glucola screen. She is here today for a ultrasound for anatomy. Problem list:  1. Advanced maternal age of 34    Ultrasound findings: The ultrasound today shows normal interval fetal growth and fluid, normal cervical length, and no malformations were detected. Views of the ventricular septum and profile appear normal within the limits of the fetal position today Recommend repeat views with her future US to be complete. Pregnancy ultrasound has limitations and is unable to detect all forms of fetal congenital abnormalities. Follow up recommended:   1. Recommend a follow-up ultrasound at 32 weeks for growth. 2. Recommend weekly nonstress test/fluid scans from 36 weeks on that can be completed locally. Pre visit time reviewing her records   5 minutes  Face to face time 5 minutes  Post visit time on documentation of note, updating her problem list, adding orders and prescriptions 5 minutes. Procedures that were completed today were charged separately. The level of decision making was straight forward.     Bev Robert MD

## 2023-08-04 NOTE — PROGRESS NOTES
Eloy Trammell  has no complaints today at [facility]. She reports fetal movements and does not report any vaginal bleeding or signs of labor. Her recently completed fetal testing revealed a normal NIPT, MSAFP and Glucola screen. She is here today for a ultrasound for anatomy. Problem list:  1. Advanced maternal age of 34    Ultrasound findings: The ultrasound today shows normal interval fetal growth and fluid, normal cervical length, and no malformations were detected. Views of the ventricular septum and profile appear normal within the limits of the fetal position today Recommend repeat views with her future US to be complete. Pregnancy ultrasound has limitations and is unable to detect all forms of fetal congenital abnormalities. Follow up recommended:   1. Recommend a follow-up ultrasound at 32 weeks for growth. 2. Recommend weekly nonstress test/fluid scans from 36 weeks on that can be completed locally. Pre visit time reviewing her records   5 minutes  Face to face time 5 minutes  Post visit time on documentation of note, updating her problem list, adding orders and prescriptions 5 minutes. Procedures that were completed today were charged separately. The level of decision making was straight forward.     Josiane Nobles MD

## 2023-08-16 ENCOUNTER — OFFICE VISIT (OUTPATIENT)
Dept: PHYSICAL THERAPY | Facility: CLINIC | Age: 38
End: 2023-08-16
Payer: COMMERCIAL

## 2023-08-16 DIAGNOSIS — M25.512 CHRONIC LEFT SHOULDER PAIN: Primary | ICD-10-CM

## 2023-08-16 DIAGNOSIS — G89.29 CHRONIC LEFT SHOULDER PAIN: Primary | ICD-10-CM

## 2023-08-16 PROCEDURE — 97110 THERAPEUTIC EXERCISES: CPT | Performed by: PHYSICAL THERAPIST

## 2023-08-16 PROCEDURE — 97161 PT EVAL LOW COMPLEX 20 MIN: CPT | Performed by: PHYSICAL THERAPIST

## 2023-08-23 ENCOUNTER — OFFICE VISIT (OUTPATIENT)
Dept: PHYSICAL THERAPY | Facility: CLINIC | Age: 38
End: 2023-08-23
Payer: COMMERCIAL

## 2023-08-23 DIAGNOSIS — G89.29 CHRONIC LEFT SHOULDER PAIN: Primary | ICD-10-CM

## 2023-08-23 DIAGNOSIS — M25.512 CHRONIC LEFT SHOULDER PAIN: Primary | ICD-10-CM

## 2023-08-23 PROCEDURE — 97110 THERAPEUTIC EXERCISES: CPT | Performed by: PHYSICAL THERAPIST

## 2023-08-23 PROCEDURE — 97140 MANUAL THERAPY 1/> REGIONS: CPT | Performed by: PHYSICAL THERAPIST

## 2023-08-23 NOTE — PROGRESS NOTES
Daily Note     Today's date: 2023  Patient name: Yesenia Gonzalez  : 1985  MRN: [id number]  Referring provider: Itz Jorge PT  Dx:   Encounter Diagnosis     ICD-10-CM    1. Chronic left shoulder pain  M25.512     G89.29                      Subjective: Marina Curry explains that she has been feeling pretty tight      Objective: See treatment diary below      Assessment: Tolerated treatment well. Patient demonstrated fatigue post treatment and exhibited good technique with therapeutic exercises. Added in rotator cuff strengthening, cueing to correct form and control during session. Updated HEP to reflect changes made this session. Plan: Continue per plan of care.       Precautions: Pregnant      Manuals            STM L UT, rhomboid, levator nv RN                                                  Neuro Re-Ed                                                                                                        Ther Ex             Sleeper s' 10x10'' 10x10''           Doorway pec s' 10x10'' 10x10''           UT/Lev s' 5x15'' 5x15''           Doorway rhomboid s' 5x15'' 5x15''           SL ER  2x10, 3#           B ER w/ TB  2x10x5' GTB                                     Ther Activity                                       Gait Training                                       Modalities

## 2023-08-25 ENCOUNTER — ROUTINE PRENATAL (OUTPATIENT)
Dept: OBGYN CLINIC | Facility: CLINIC | Age: 38
End: 2023-08-25
Payer: COMMERCIAL

## 2023-08-25 VITALS
WEIGHT: 184 LBS | SYSTOLIC BLOOD PRESSURE: 104 MMHG | BODY MASS INDEX: 34.74 KG/M2 | DIASTOLIC BLOOD PRESSURE: 64 MMHG | HEIGHT: 61 IN

## 2023-08-25 DIAGNOSIS — O99.212 OBESITY AFFECTING PREGNANCY IN SECOND TRIMESTER: ICD-10-CM

## 2023-08-25 DIAGNOSIS — K21.9 GASTROESOPHAGEAL REFLUX DURING PREGNANCY IN SECOND TRIMESTER, ANTEPARTUM: ICD-10-CM

## 2023-08-25 DIAGNOSIS — G56.00 CARPAL TUNNEL SYNDROME DURING PREGNANCY: Primary | ICD-10-CM

## 2023-08-25 DIAGNOSIS — Z3A.23 23 WEEKS GESTATION OF PREGNANCY: ICD-10-CM

## 2023-08-25 DIAGNOSIS — O26.899 CARPAL TUNNEL SYNDROME DURING PREGNANCY: Primary | ICD-10-CM

## 2023-08-25 DIAGNOSIS — O09.512 PRIMIGRAVIDA OF ADVANCED MATERNAL AGE IN SECOND TRIMESTER: ICD-10-CM

## 2023-08-25 DIAGNOSIS — Z36.89 ENCOUNTER FOR OTHER SPECIFIED ANTENATAL SCREENING: ICD-10-CM

## 2023-08-25 DIAGNOSIS — O98.312 GENITAL HERPES AFFECTING PREGNANCY IN SECOND TRIMESTER: ICD-10-CM

## 2023-08-25 DIAGNOSIS — A60.09 GENITAL HERPES AFFECTING PREGNANCY IN SECOND TRIMESTER: ICD-10-CM

## 2023-08-25 DIAGNOSIS — O99.612 GASTROESOPHAGEAL REFLUX DURING PREGNANCY IN SECOND TRIMESTER, ANTEPARTUM: ICD-10-CM

## 2023-08-25 LAB
SL AMB  POCT GLUCOSE, UA: NEGATIVE
SL AMB POCT URINE PROTEIN: NEGATIVE

## 2023-08-25 PROCEDURE — 81002 URINALYSIS NONAUTO W/O SCOPE: CPT | Performed by: PHYSICIAN ASSISTANT

## 2023-08-25 PROCEDURE — PNV: Performed by: PHYSICIAN ASSISTANT

## 2023-08-25 NOTE — ASSESSMENT & PLAN NOTE
Reviewed worsening carpal tunnel in pregnancy. Reviewed option of hand physical therapy. Patient interested, referral placed.

## 2023-08-25 NOTE — ASSESSMENT & PLAN NOTE
Script for 28 week labs given including 1hr GTT, CBC, RPR. Continue routine prenatal care. Return to office for ob check in 4 weeks.

## 2023-08-25 NOTE — PROGRESS NOTES
Routine Prenatal Visit  [address] [place] [address], [address], [facility], [address] [facility],[address]    Assessment/Plan:  Daljit Arroyo is a 34y.o. year old  at 23w3d who presents for routine prenatal visit. 1. Carpal tunnel syndrome during pregnancy  Assessment & Plan:  Reviewed worsening carpal tunnel in pregnancy. Reviewed option of hand physical therapy. Patient interested, referral placed. Orders:  -     Ambulatory Referral to Physical Therapy; Future    2. 23 weeks gestation of pregnancy  Assessment & Plan:  Script for 28 week labs given including 1hr GTT, CBC, RPR. Continue routine prenatal care. Return to office for ob check in 4 weeks. Orders:  -     POCT urine dip    3. Encounter for other specified  screening  -     Glucose, 1H PG; Future  -     CBC; Future  -     RPR, Rfx Qn RPR/Confirm TP; Future  -     Glucose, 1H PG  -     CBC  -     RPR, Rfx Qn RPR/Confirm TP    4. Genital herpes affecting pregnancy in second trimester  Assessment & Plan:  Reviewed Valtrex suppression starting at 36 weeks. 11. Primigravida of advanced maternal age in second trimester  Assessment & Plan:  Continue  mg until 36 weeks. 6. Gastroesophageal reflux during pregnancy in second trimester, antepartum    7. Obesity affecting pregnancy in second trimester      Next OB Visit 4 weeks. Subjective:     CC: Prenatal care    Gretchen Gonzalez is a 34 y.o.  female who presents for routine prenatal care at 23w3d. Pregnancy ROS: Good FM, reports worsening carpal tunnel. swelling in lower legs, improved from previous. Reports was moving and on feet a lot, went to venous dopplers of legs secondary to swelling which were negative for DVT. No N/V, HA, cramping, VB, LOF, domestic violence, or smoking. Tolerating PNV.       The following portions of the patient's history were reviewed and updated as appropriate: allergies, current medications, past family history, past medical history, obstetric history, gynecologic history, past social history, past surgical history and problem list.      Objective:  /64 (BP Location: Right arm, Patient Position: Sitting, Cuff Size: Standard)   Ht 5' 1" (1.549 m)   Wt 83.5 kg (184 lb)   LMP 2023 (Approximate)   BMI 34.77 kg/m²   Pregravid Weight/BMI: 72.6 kg (160 lb) (BMI 30.25)  Current Weight: 83.5 kg (184 lb)   Total Weight Gain: 10.9 kg (24 lb)   Pre-Iraida Vitals    Flowsheet Row Most Recent Value   Prenatal Assessment    Fetal Heart Rate 145   Fundal Height (cm) 23 cm   Movement Present   Prenatal Vitals    Blood Pressure 104/64   Weight - Scale 83.5 kg (184 lb)   Urine Albumin/Glucose    Dilation/Effacement/Station    Vaginal Drainage    Edema    LLE Edema Trace   RLE Edema Trace   Facial Edema None           General: Well appearing, no distress  Abdomen: Soft, gravid, nontender  Fundal Height: Appropriate for gestational age. Extremities: Warm and well perfused. Non tender.

## 2023-08-30 ENCOUNTER — APPOINTMENT (OUTPATIENT)
Dept: PHYSICAL THERAPY | Facility: CLINIC | Age: 38
End: 2023-08-30
Payer: COMMERCIAL

## 2023-09-06 ENCOUNTER — APPOINTMENT (OUTPATIENT)
Dept: PHYSICAL THERAPY | Facility: CLINIC | Age: 38
End: 2023-09-06
Payer: COMMERCIAL

## 2023-09-11 ENCOUNTER — TELEPHONE (OUTPATIENT)
Dept: PERINATAL CARE | Facility: CLINIC | Age: 38
End: 2023-09-11

## 2023-09-11 NOTE — TELEPHONE ENCOUNTER
Left voicemail informing patient of the change for her upcoming appointment on 09/29 and new appt time @ 9:15 am  U/P Requested she give our office a call back at [phone] with any questions or if she would need to reschedule.

## 2023-09-13 ENCOUNTER — OFFICE VISIT (OUTPATIENT)
Dept: PHYSICAL THERAPY | Facility: CLINIC | Age: 38
End: 2023-09-13
Payer: COMMERCIAL

## 2023-09-13 DIAGNOSIS — G89.29 CHRONIC LEFT SHOULDER PAIN: Primary | ICD-10-CM

## 2023-09-13 DIAGNOSIS — M25.512 CHRONIC LEFT SHOULDER PAIN: Primary | ICD-10-CM

## 2023-09-13 PROCEDURE — 97110 THERAPEUTIC EXERCISES: CPT | Performed by: PHYSICAL THERAPIST

## 2023-09-13 PROCEDURE — 97140 MANUAL THERAPY 1/> REGIONS: CPT | Performed by: PHYSICAL THERAPIST

## 2023-09-13 NOTE — PROGRESS NOTES
Daily Note     Today's date: 2023  Patient name: Noah No  : 1985  MRN: [id number]  Referring provider: Simon Newman PT  Dx:   Encounter Diagnosis     ICD-10-CM    1. Chronic left shoulder pain  M25.512     G89.29                      Subjective: Trevin Salgado explains that her shoulder is feeling better, she feels comfortable and independent with her HEP. Objective: See treatment diary below      Assessment: Tolerated treatment well. Patient demonstrated fatigue post treatment and exhibited good technique with therapeutic exercises. Challenge noted with snow angels against wall, continued stretch felt with doorway rhomboid and pec stretches. Updated HEP to reflect changes made this session. Plan: Continue per plan of care.       Precautions: Pregnant      Manuals           STM L UT, rhomboid, levator nv RN RN                                                 Neuro Re-Ed                                                                                                        Ther Ex             Sleeper s' 10x10'' 10x10''           Doorway pec s' 10x10'' 10x10'' 10x10''          UT/Lev s' 5x15'' 5x15''           Doorway rhomboid s' 5x15'' 5x15'' 5x15''          SL ER  2x10, 3#           B ER w/ TB  2x10x5' GTB 2x10x5'' GTB          Seated chin tuck   10x5''          Snow angels against wall   2x10          Ther Activity                                       Gait Training                                       Modalities

## 2023-09-19 ENCOUNTER — ROUTINE PRENATAL (OUTPATIENT)
Dept: OBGYN CLINIC | Facility: CLINIC | Age: 38
End: 2023-09-19
Payer: COMMERCIAL

## 2023-09-19 VITALS
DIASTOLIC BLOOD PRESSURE: 62 MMHG | WEIGHT: 190.6 LBS | SYSTOLIC BLOOD PRESSURE: 116 MMHG | HEIGHT: 61 IN | BODY MASS INDEX: 35.98 KG/M2

## 2023-09-19 DIAGNOSIS — O99.212 OBESITY AFFECTING PREGNANCY IN SECOND TRIMESTER: ICD-10-CM

## 2023-09-19 DIAGNOSIS — O98.312 GENITAL HERPES AFFECTING PREGNANCY IN SECOND TRIMESTER: ICD-10-CM

## 2023-09-19 DIAGNOSIS — Z3A.27 27 WEEKS GESTATION OF PREGNANCY: ICD-10-CM

## 2023-09-19 DIAGNOSIS — N64.4 BREAST PAIN DURING PREGNANCY: Primary | ICD-10-CM

## 2023-09-19 DIAGNOSIS — K21.9 GASTROESOPHAGEAL REFLUX DURING PREGNANCY IN SECOND TRIMESTER, ANTEPARTUM: ICD-10-CM

## 2023-09-19 DIAGNOSIS — O09.512 PRIMIGRAVIDA OF ADVANCED MATERNAL AGE IN SECOND TRIMESTER: ICD-10-CM

## 2023-09-19 DIAGNOSIS — O99.612 GASTROESOPHAGEAL REFLUX DURING PREGNANCY IN SECOND TRIMESTER, ANTEPARTUM: ICD-10-CM

## 2023-09-19 DIAGNOSIS — O92.29 BREAST PAIN DURING PREGNANCY: Primary | ICD-10-CM

## 2023-09-19 DIAGNOSIS — A60.09 GENITAL HERPES AFFECTING PREGNANCY IN SECOND TRIMESTER: ICD-10-CM

## 2023-09-19 LAB
SL AMB  POCT GLUCOSE, UA: NORMAL
SL AMB POCT URINE PROTEIN: NORMAL

## 2023-09-19 PROCEDURE — PNV: Performed by: PHYSICIAN ASSISTANT

## 2023-09-19 PROCEDURE — 81002 URINALYSIS NONAUTO W/O SCOPE: CPT | Performed by: PHYSICIAN ASSISTANT

## 2023-09-19 NOTE — PROGRESS NOTES
Routine Prenatal Visit  [address]  [address] [place], Miladis Arroyo, [address]    Assessment/Plan:  Malu Montano is a 34y.o. year old  at 27w0d who presents for routine prenatal visit. 1. Breast pain during pregnancy  Assessment & Plan:  Reviewed bilateral breast pain. Recommend well supported bra, warm or cool compresses. Continue to monitor for s/s of infection or abscess. Script for bilateral breast ultrasound given if persists, worsens, or develops mass. Reviewed family hx of breast cancer in birth mother, recommend considering genetic testing. Orders:  -     US breast right limited (diagnostic); Future; Expected date: 2023  -     US breast left limited (diagnostic); Future; Expected date: 2023    2. 27 weeks gestation of pregnancy  Assessment & Plan:  Has script for 28 week labs, has scheduled. Reviewed new COVID booster, recommended in pregnancy. Reviewed flu shot recommendations. Will plan Tdap next visit. Return to office for ob check in 2 weeks. Orders:  -     POCT urine dip    3. Genital herpes affecting pregnancy in second trimester  Assessment & Plan: Will plan to start Valtrex 36 weeks. 3. Primigravida of advanced maternal age in second trimester  Assessment & Plan:  Continue  mg until 36 weeks. 5. Gastroesophageal reflux during pregnancy in second trimester, antepartum    6. Obesity affecting pregnancy in second trimester      Next OB Visit 2 weeks. Subjective:     CC: Prenatal care    Nohemi Ulloa is a 34 y.o. Janiya Negro female who presents for routine prenatal care at 27w0d. Pregnancy ROS: Good FM, No N/V, HA, cramping, VB, LOF, edema, domestic violence, or smoking. Tolerating PNV. Reports bilateral breast pain over the last 3 days, worse on the left side. Breasts feel swollen. Has crusting on nipple. Nipple sensitive. Has tried organic argon oil on area and warm compresses. Has been having some milk leakage.  Denies fever, chills, myalgias. Reports birth mother with breast cancer in late 29's early 42's. Patient has not had genetic testing. The following portions of the patient's history were reviewed and updated as appropriate: allergies, current medications, past family history, past medical history, obstetric history, gynecologic history, past social history, past surgical history and problem list.      Objective:  /62 (BP Location: Right arm, Patient Position: Sitting, Cuff Size: Standard)   Ht 5' 1" (1.549 m)   Wt 86.5 kg (190 lb 9.6 oz)   LMP 2023 (Approximate)   BMI 36.01 kg/m²   Pregravid Weight/BMI: 72.6 kg (160 lb) (BMI 30.25)  Current Weight: 86.5 kg (190 lb 9.6 oz)   Total Weight Gain: 13.9 kg (30 lb 9.6 oz)   Pre-Iraida Vitals    Flowsheet Row Most Recent Value   Prenatal Assessment    Fetal Heart Rate 152   Fundal Height (cm) 27 cm   Movement Present   Prenatal Vitals    Blood Pressure 116/62   Weight - Scale 86.5 kg (190 lb 9.6 oz)   Urine Albumin/Glucose    Dilation/Effacement/Station    Vaginal Drainage    Edema    LLE Edema None   RLE Edema None   Facial Edema None           General: Well appearing, no distress  Abdomen: Soft, gravid, nontender  Fundal Height: Appropriate for gestational age. Extremities: Warm and well perfused. Non tender. Physical Exam  Chest:   Breasts:     Right: Swelling and tenderness present. No bleeding, inverted nipple, mass or nipple discharge. Left: Swelling and tenderness present. No bleeding, inverted nipple, mass or nipple discharge. Comments: Nipple mildly erythematous, breasts engorged bilaterally, more on left side. No palpable mass. Lymphadenopathy:      Upper Body:      Right upper body: No supraclavicular or axillary adenopathy. Left upper body: No supraclavicular or axillary adenopathy.

## 2023-09-19 NOTE — ASSESSMENT & PLAN NOTE
Has script for 28 week labs, has scheduled. Reviewed new COVID booster, recommended in pregnancy. Reviewed flu shot recommendations. Will plan Tdap next visit. Return to office for ob check in 2 weeks.

## 2023-09-19 NOTE — ASSESSMENT & PLAN NOTE
Reviewed bilateral breast pain. Recommend well supported bra, warm or cool compresses. Continue to monitor for s/s of infection or abscess. Script for bilateral breast ultrasound given if persists, worsens, or develops mass. Reviewed family hx of breast cancer in birth mother, recommend considering genetic testing.

## 2023-09-20 ENCOUNTER — EVALUATION (OUTPATIENT)
Dept: PHYSICAL THERAPY | Facility: CLINIC | Age: 38
End: 2023-09-20
Payer: COMMERCIAL

## 2023-09-20 DIAGNOSIS — M62.9 MUSCULOSKELETAL DISORDER INVOLVING UPPER TRAPEZIUS MUSCLE: Primary | ICD-10-CM

## 2023-09-20 PROCEDURE — 97164 PT RE-EVAL EST PLAN CARE: CPT | Performed by: PHYSICAL THERAPIST

## 2023-09-20 PROCEDURE — 97140 MANUAL THERAPY 1/> REGIONS: CPT | Performed by: PHYSICAL THERAPIST

## 2023-09-20 PROCEDURE — 97110 THERAPEUTIC EXERCISES: CPT | Performed by: PHYSICAL THERAPIST

## 2023-09-20 NOTE — PROGRESS NOTES
PT Re-Evaluation     Today's date: 2023  Patient name: Wilhelminia Primrose  : 1985  MRN: [id number]  Referring provider: Manish Bailey, PT  Dx:   Encounter Diagnosis     ICD-10-CM    1. Musculoskeletal disorder involving upper trapezius muscle  M62.9                      Assessment  Assessment details:  Germán Sawyer is a 34year old female presenting to physical therapy with left sided upper trapezius/levator scapulae pain. Patient presents with pain, decreased strength, decreased internal rotation range of motion and decreased tolerance to activity. Due to these impairments patient has difficulty performing activities of daily living, recreational activities and engaging in social activities. Patient would benefit from skilled physical therapy services to address these impairments and to maximize function. Thank you for the referral.   Impairments: abnormal or restricted ROM, activity intolerance, impaired physical strength, lacks appropriate home exercise program, pain with function and scapular dyskinesis  Understanding of Dx/Px/POC: excellent  Goals  HEP only    Plan  Patient would benefit from: skilled PT  Planned therapy interventions: joint mobilization, body mechanics training, balance, patient education, therapeutic exercise, home exercise program, neuromuscular re-education, strengthening, graded activity, graded exercise, stretching and therapeutic activities  Frequency: 1x week  Plan of Care beginning date: 2023  Plan of Care expiration date: 2023  Treatment plan discussed with: patient        Subjective Evaluation    History of Present Illness  Mechanism of injury:  Germán Sawyer is a 34year old female presenting to physical therapy with left upper trapezius and levator scapulae discomfort and left shoulder pain. She explains that this has been an ongoing issue and she is looking for independent home exercises to attempt to control her discomfort at this point.  She is looking for ways to easily stretch these muscles as she continues through her pregnancy. Patient Goals  Patient goals for therapy: decreased pain, increased motion and return to sport/leisure activities    Pain  Current pain ratin  At best pain ratin  At worst pain ratin  Quality: dull ache, sharp and tight  Progression: worsening          Objective     Static Posture     Shoulders  Rounded. Palpation   Left   Muscle spasm in the levator scapulae, rhomboids and upper trapezius. Tenderness of the levator scapulae, rhomboids and upper trapezius. Tenderness     Left Shoulder   Tenderness in the biceps tendon (proximal) and bicipital groove. No tenderness in the acromion, infraspinatus tendon, lateral scapula, medial scapula, subscapularis tendon and supraspinatus tendon. Active Range of Motion   Cervical/Thoracic Spine       Cervical  Subcranial protraction:   Restriction level: minimal  Subcranial retraction:   Restriction level: minimal  Flexion:  Restriction level: minimal  Extension:  Restriction level: minimal  Left lateral flexion:  Restriction level: moderate  Right lateral flexion:  Restriction level moderate  Left rotation:  Restriction level: minimal  Right rotation:  Restriction level: minimal  Left Shoulder   Normal active range of motion    Passive Range of Motion   Left Shoulder   External rotation 90°: 90 degrees   Internal rotation 45°: with pain  Internal rotation 90°: 45 degrees     Joint Play   Left Shoulder  Joints within functional limits are the anterior capsule, posterior capsule and inferior capsule. Strength/Myotome Testing     Left Shoulder     Planes of Motion   Flexion: 4 (P!)   Extension: 4+   Abduction: 4   Adduction: 4   External rotation at 0°: 4 (P!)   Internal rotation at 0°: 4+     Tests     Left Shoulder   Positive Hawkin's. Negative drop arm, empty can, Neer's, painful arc, Speed's and ULTT1.               Precautions: Pregnant        Manuals              STM L UT, rhomboid, levator nv RN RN  RN                                                                                       Neuro Re-Ed                                                                                                                                                                                               Ther Ex                       Sleeper s' 10x10'' 10x10''                   Doorway pec s' 10x10'' 10x10'' 10x10''  10x10''               UT/Lev s' 5x15'' 5x15''                   Doorway rhomboid s' 5x15'' 5x15'' 5x15''  5x15''               SL ER   2x10, 3#                   B ER w/ TB   2x10x5' GTB 2x10x5'' GTB  2x10x5'' GTB               Seated chin tuck     10x5''  10x5''               Snow angels against wall     2x10                 S hook    HEP         Ball massage against wall    HEP                                   Ther Activity                                                                       Gait Training                                                                       Modalities

## 2023-09-25 ENCOUNTER — CLINICAL SUPPORT (OUTPATIENT)
Age: 38
End: 2023-09-25

## 2023-09-25 DIAGNOSIS — Z32.2 ENCOUNTER FOR CHILDBIRTH INSTRUCTION: Primary | ICD-10-CM

## 2023-09-26 ENCOUNTER — PATIENT OUTREACH (OUTPATIENT)
Dept: OBGYN CLINIC | Facility: CLINIC | Age: 38
End: 2023-09-26

## 2023-09-26 NOTE — PROGRESS NOTES
Patient reached out to KAVITHA to inquire about alternatives for baby CPR class - reports she tried to schedule for a virtual class but was notified that registration is closed. KAVITHA reached out to [facility] at [address] [facility] to ask about class availability - the next class they have 1 spot left for is on 11/11. The next available would be 11/29. Both of these November slots are on site at the [facility]. They also have a class on 12/18 on site at the [facility]. Unfortunately, they currently do not have any virtual classes available. SW encouraged patient to call the Baby and [facility] if she is interested in scheduling for any of these classes. KAVITHA also sent patient information for the [employer] as they offer infant/child CPR (virtual) and Adult & Pediatric (in-person) training.

## 2023-09-28 LAB
EXTERNAL HEMOGLOBIN: 12 G/DL
EXTERNAL PLATELET COUNT: 206 K/ÂΜL
EXTERNAL SYPHILIS TOTAL IGG/IGM SCREENING: NORMAL

## 2023-09-29 ENCOUNTER — ULTRASOUND (OUTPATIENT)
Dept: PERINATAL CARE | Facility: OTHER | Age: 38
End: 2023-09-29
Payer: COMMERCIAL

## 2023-09-29 VITALS
HEART RATE: 98 BPM | WEIGHT: 191 LBS | SYSTOLIC BLOOD PRESSURE: 102 MMHG | BODY MASS INDEX: 36.06 KG/M2 | DIASTOLIC BLOOD PRESSURE: 52 MMHG | HEIGHT: 61 IN

## 2023-09-29 DIAGNOSIS — O99.213 OBESITY AFFECTING PREGNANCY IN THIRD TRIMESTER: ICD-10-CM

## 2023-09-29 DIAGNOSIS — Z3A.28 28 WEEKS GESTATION OF PREGNANCY: Primary | ICD-10-CM

## 2023-09-29 DIAGNOSIS — O09.513 PRIMIGRAVIDA OF ADVANCED MATERNAL AGE IN THIRD TRIMESTER: ICD-10-CM

## 2023-09-29 LAB
ERYTHROCYTE [DISTWIDTH] IN BLOOD BY AUTOMATED COUNT: 12.9 % (ref 11.7–15.4)
GLUCOSE 1H P 50 G GLC PO SERPL-MCNC: 82 MG/DL (ref 70–139)
HCT VFR BLD AUTO: 34.6 % (ref 34–46.6)
HGB BLD-MCNC: 12 G/DL (ref 11.1–15.9)
MCH RBC QN AUTO: 30.8 PG (ref 26.6–33)
MCHC RBC AUTO-ENTMCNC: 34.7 G/DL (ref 31.5–35.7)
MCV RBC AUTO: 89 FL (ref 79–97)
PLATELET # BLD AUTO: 206 X10E3/UL (ref 150–450)
RBC # BLD AUTO: 3.9 X10E6/UL (ref 3.77–5.28)
RPR SER QL: NON REACTIVE
WBC # BLD AUTO: 11.8 X10E3/UL (ref 3.4–10.8)

## 2023-09-29 PROCEDURE — 76816 OB US FOLLOW-UP PER FETUS: CPT | Performed by: OBSTETRICS & GYNECOLOGY

## 2023-09-29 PROCEDURE — 99213 OFFICE O/P EST LOW 20 MIN: CPT | Performed by: OBSTETRICS & GYNECOLOGY

## 2023-09-29 NOTE — LETTER
September 29, 2023     Macy Hinojosa, [address] [place] [address]  [address] [facility]    Patient: Odette Rouse   YOB: 1985   Date of Visit: 9/29/2023       Dear Dr. Lorin Gonzales: Thank you for referring Odette Rouse to me for evaluation. Below are my notes for this consultation. If you have questions, please do not hesitate to call me. I look forward to following your patient along with you. Sincerely,        Good Gomez MD        CC: No Recipients    Good Gomez MD  9/29/2023 10:43 AM  Sign when Signing Visit  A fetal ultrasound was completed. See Ob procedures in Epic for an interpretation and recommendations. Do not hesitate to contact us in [facility] if you have questions. Clyde Payne MD, [address] [facility]  Maternal Fetal Medicine

## 2023-09-29 NOTE — PROGRESS NOTES
A fetal ultrasound was completed. See Ob procedures in Epic for an interpretation and recommendations. Do not hesitate to contact us in [facility] if you have questions. Deedee Vincent MD, [address] [facility]  Maternal Fetal Medicine

## 2023-10-02 ENCOUNTER — ROUTINE PRENATAL (OUTPATIENT)
Dept: OBGYN CLINIC | Facility: CLINIC | Age: 38
End: 2023-10-02
Payer: COMMERCIAL

## 2023-10-02 VITALS — WEIGHT: 191.6 LBS | SYSTOLIC BLOOD PRESSURE: 104 MMHG | DIASTOLIC BLOOD PRESSURE: 60 MMHG | BODY MASS INDEX: 36.2 KG/M2

## 2023-10-02 DIAGNOSIS — O99.612 GASTROESOPHAGEAL REFLUX DURING PREGNANCY IN SECOND TRIMESTER, ANTEPARTUM: ICD-10-CM

## 2023-10-02 DIAGNOSIS — Z23 NEED FOR TDAP VACCINATION: ICD-10-CM

## 2023-10-02 DIAGNOSIS — K21.9 GASTROESOPHAGEAL REFLUX DURING PREGNANCY IN SECOND TRIMESTER, ANTEPARTUM: ICD-10-CM

## 2023-10-02 DIAGNOSIS — A60.09 GENITAL HERPES AFFECTING PREGNANCY IN SECOND TRIMESTER: ICD-10-CM

## 2023-10-02 DIAGNOSIS — O98.312 GENITAL HERPES AFFECTING PREGNANCY IN SECOND TRIMESTER: ICD-10-CM

## 2023-10-02 DIAGNOSIS — O09.513 PRIMIGRAVIDA OF ADVANCED MATERNAL AGE IN THIRD TRIMESTER: Primary | ICD-10-CM

## 2023-10-02 DIAGNOSIS — Z3A.28 28 WEEKS GESTATION OF PREGNANCY: ICD-10-CM

## 2023-10-02 LAB
SL AMB  POCT GLUCOSE, UA: NORMAL
SL AMB POCT URINE PROTEIN: NORMAL

## 2023-10-02 PROCEDURE — PNV: Performed by: OBSTETRICS & GYNECOLOGY

## 2023-10-02 PROCEDURE — 90471 IMMUNIZATION ADMIN: CPT | Performed by: OBSTETRICS & GYNECOLOGY

## 2023-10-02 PROCEDURE — 81002 URINALYSIS NONAUTO W/O SCOPE: CPT | Performed by: OBSTETRICS & GYNECOLOGY

## 2023-10-02 PROCEDURE — 90715 TDAP VACCINE 7 YRS/> IM: CPT | Performed by: OBSTETRICS & GYNECOLOGY

## 2023-10-02 NOTE — PROGRESS NOTES
Routine Prenatal Visit  [address] [place] [address], [address], [facility], [address] [facility],8W    Assessment/Plan:  Anuja Hanna is a 34y.o. year old  at 30w5d who presents for routine prenatal visit. 1. 28 weeks gestation of pregnancy  -     POCT urine dip    2. Need for Tdap vaccination  -     TDAP VACCINE GREATER THAN OR EQUAL TO 8YO IM    3. Genital herpes affecting pregnancy in second trimester    4. Primigravida of advanced maternal age in third trimester    5. Gastroesophageal reflux during pregnancy in second trimester, antepartum      + fm  No UTCX   Labs  And  Growth scan reviewed. Fu in  4 weeks     Subjective:     CC: Prenatal care    Mulu Chung is a 34 y.o.  female who presents for routine prenatal care at 30w5d. Pregnancy ROS: no  leakage of fluid, pelvic pain, or vaginal bleeding.  + fetal movement. The following portions of the patient's history were reviewed and updated as appropriate: allergies, current medications, past family history, past medical history, obstetric history, gynecologic history, past social history, past surgical history and problem list.      Objective:  /60 (BP Location: Left arm, Patient Position: Sitting, Cuff Size: Standard)   Wt 86.9 kg (191 lb 9.6 oz)   LMP 2023 (Approximate)   BMI 36.20 kg/m²   Pregravid Weight/BMI: 72.6 kg (160 lb) (BMI 30.25)  Current Weight: 86.9 kg (191 lb 9.6 oz)   Total Weight Gain: 14.3 kg (31 lb 9.6 oz)   Pre-Iraida Vitals    Flowsheet Row Most Recent Value   Prenatal Assessment    Prenatal Vitals    Blood Pressure 104/60   Weight - Scale 86.9 kg (191 lb 9.6 oz)   Urine Albumin/Glucose    Dilation/Effacement/Station    Vaginal Drainage    Edema            General: Well appearing, no distress  Respiratory: Unlabored breathing  Cardiovascular: Regular rate. Abdomen: Soft, gravid, nontender  Fundal Height: Appropriate for gestational age. Extremities: Warm and well perfused. Non tender.

## 2023-10-11 ENCOUNTER — CLINICAL SUPPORT (OUTPATIENT)
Age: 38
End: 2023-10-11

## 2023-10-11 DIAGNOSIS — Z32.2 ENCOUNTER FOR CHILDBIRTH INSTRUCTION: Primary | ICD-10-CM

## 2023-10-18 ENCOUNTER — ROUTINE PRENATAL (OUTPATIENT)
Dept: OBGYN CLINIC | Facility: CLINIC | Age: 38
End: 2023-10-18
Payer: COMMERCIAL

## 2023-10-18 VITALS
DIASTOLIC BLOOD PRESSURE: 60 MMHG | SYSTOLIC BLOOD PRESSURE: 100 MMHG | BODY MASS INDEX: 36.82 KG/M2 | WEIGHT: 195 LBS | HEIGHT: 61 IN

## 2023-10-18 DIAGNOSIS — Z23 FLU VACCINE NEED: ICD-10-CM

## 2023-10-18 DIAGNOSIS — O99.612 GASTROESOPHAGEAL REFLUX DURING PREGNANCY IN SECOND TRIMESTER, ANTEPARTUM: ICD-10-CM

## 2023-10-18 DIAGNOSIS — O98.319 GENITAL HERPES AFFECTING PREGNANCY, ANTEPARTUM: ICD-10-CM

## 2023-10-18 DIAGNOSIS — Z3A.31 31 WEEKS GESTATION OF PREGNANCY: ICD-10-CM

## 2023-10-18 DIAGNOSIS — O09.513 PRIMIGRAVIDA OF ADVANCED MATERNAL AGE IN THIRD TRIMESTER: Primary | ICD-10-CM

## 2023-10-18 DIAGNOSIS — K21.9 GASTROESOPHAGEAL REFLUX DURING PREGNANCY IN SECOND TRIMESTER, ANTEPARTUM: ICD-10-CM

## 2023-10-18 DIAGNOSIS — A60.09 GENITAL HERPES AFFECTING PREGNANCY, ANTEPARTUM: ICD-10-CM

## 2023-10-18 DIAGNOSIS — O99.213 OBESITY AFFECTING PREGNANCY IN THIRD TRIMESTER, UNSPECIFIED OBESITY TYPE: ICD-10-CM

## 2023-10-18 LAB
SL AMB  POCT GLUCOSE, UA: NORMAL
SL AMB POCT URINE PROTEIN: NORMAL

## 2023-10-18 PROCEDURE — 90471 IMMUNIZATION ADMIN: CPT | Performed by: STUDENT IN AN ORGANIZED HEALTH CARE EDUCATION/TRAINING PROGRAM

## 2023-10-18 PROCEDURE — 90686 IIV4 VACC NO PRSV 0.5 ML IM: CPT | Performed by: STUDENT IN AN ORGANIZED HEALTH CARE EDUCATION/TRAINING PROGRAM

## 2023-10-18 PROCEDURE — PNV: Performed by: STUDENT IN AN ORGANIZED HEALTH CARE EDUCATION/TRAINING PROGRAM

## 2023-10-18 PROCEDURE — 81002 URINALYSIS NONAUTO W/O SCOPE: CPT | Performed by: STUDENT IN AN ORGANIZED HEALTH CARE EDUCATION/TRAINING PROGRAM

## 2023-10-18 NOTE — ASSESSMENT & PLAN NOTE
- PTL/PPROM/Bleeding precautions given. Kick counts reviewed. - Third trimester labs reviewed. Rhogam.  - Flu vaccine administered today. - Problem list updated, results console reviewed and updated with pertinent prenatal labs.   - PMH, PSH, medications reviewed and updated as needed  - Return to office in 2 wk(s) for routine prenatal care

## 2023-10-18 NOTE — ASSESSMENT & PLAN NOTE
- Continue  mg PO daily until 36 weeks. - Growth US scheduled with [facility]. - Plan for initiation of weekly  testing beginning at 42 weeks.

## 2023-10-18 NOTE — PROGRESS NOTES
Routine Prenatal Visit  [address] [place] [address], [address], [facility], [address] [facility],8W    Assessment/Plan:  Trevin Salgado is a 34y.o. year old  at 31w1d who presents for routine prenatal visit. 3. Primigravida of advanced maternal age in third trimester  Assessment & Plan:  - Continue  mg PO daily until 36 weeks. - Growth US scheduled with [facility]. - Plan for initiation of weekly  testing beginning at 42 weeks. 2. Obesity affecting pregnancy in third trimester, unspecified obesity type    3. Genital herpes affecting pregnancy, antepartum  Assessment & Plan:  - Plan for Valtrex suppression beginning at 36 weeks. 4. 31 weeks gestation of pregnancy  Assessment & Plan:  - PTL/PPROM/Bleeding precautions given. Kick counts reviewed. - Third trimester labs reviewed. Rhogam.  - Flu vaccine administered today. - Problem list updated, results console reviewed and updated with pertinent prenatal labs. - PMH, PSH, medications reviewed and updated as needed  - Return to office in 2 wk(s) for routine prenatal care      Orders:  -     POCT urine dip    5. Gastroesophageal reflux during pregnancy in second trimester, antepartum    6. Flu vaccine need  -     influenza vaccine, quadrivalent, 0.5 mL, preservative-free, for adult and pediatric patients 6 mos+ (AFLURIA, FLUARIX, FLULAVAL, FLUZONE)          Subjective:   Noah No is a 34 y.o. [place] who presents for routine prenatal care at 31w1d. Complaints today: No  LOF: No; VB: No; Contractions: No; FM: Present    Objective:  /60 (BP Location: Left arm, Patient Position: Sitting, Cuff Size: Standard)   Ht 5' 1" (1.549 m)   Wt 88.5 kg (195 lb)   LMP 2023 (Approximate)   BMI 36.84 kg/m²     General: Well appearing, no distress  Respiratory: Unlabored breathing  Cardiovascular: Regular rate. Abdomen: Soft, gravid, nontender  Extremities: Warm and well perfused. Non tender.     Pregravid Weight/BMI: 72.6 kg (160 lb) (BMI 30.25)  Current Weight: 88.5 kg (195 lb)   Total Weight Gain: 15.9 kg (35 lb)     Pre- Vitals      Flowsheet Row Most Recent Value   Prenatal Assessment    Fetal Heart Rate 130   Fundal Height (cm) 31 cm   Movement Present   Prenatal Vitals    Blood Pressure 100/60   Weight - Scale 88.5 kg (195 lb)   Urine Albumin/Glucose    Dilation/Effacement/Station    Vaginal Drainage    Draining Fluid No   Edema    LLE Edema None   RLE Edema None   Facial Edema None             Elise Mcclain MD  10/18/2023 6:42 PM

## 2023-11-03 ENCOUNTER — ROUTINE PRENATAL (OUTPATIENT)
Dept: OBGYN CLINIC | Facility: CLINIC | Age: 38
End: 2023-11-03
Payer: COMMERCIAL

## 2023-11-03 VITALS
BODY MASS INDEX: 36.06 KG/M2 | WEIGHT: 191 LBS | HEIGHT: 61 IN | SYSTOLIC BLOOD PRESSURE: 112 MMHG | DIASTOLIC BLOOD PRESSURE: 62 MMHG

## 2023-11-03 DIAGNOSIS — A60.09 GENITAL HERPES AFFECTING PREGNANCY, ANTEPARTUM: Primary | ICD-10-CM

## 2023-11-03 DIAGNOSIS — O98.319 GENITAL HERPES AFFECTING PREGNANCY, ANTEPARTUM: Primary | ICD-10-CM

## 2023-11-03 DIAGNOSIS — O99.213 OBESITY AFFECTING PREGNANCY IN THIRD TRIMESTER, UNSPECIFIED OBESITY TYPE: ICD-10-CM

## 2023-11-03 DIAGNOSIS — O99.612 GASTROESOPHAGEAL REFLUX DURING PREGNANCY IN SECOND TRIMESTER, ANTEPARTUM: ICD-10-CM

## 2023-11-03 DIAGNOSIS — K21.9 GASTROESOPHAGEAL REFLUX DURING PREGNANCY IN SECOND TRIMESTER, ANTEPARTUM: ICD-10-CM

## 2023-11-03 DIAGNOSIS — Z3A.33 33 WEEKS GESTATION OF PREGNANCY: ICD-10-CM

## 2023-11-03 DIAGNOSIS — O09.513 PRIMIGRAVIDA OF ADVANCED MATERNAL AGE IN THIRD TRIMESTER: ICD-10-CM

## 2023-11-03 LAB
SL AMB  POCT GLUCOSE, UA: NORMAL
SL AMB POCT URINE PROTEIN: NORMAL

## 2023-11-03 PROCEDURE — 81002 URINALYSIS NONAUTO W/O SCOPE: CPT | Performed by: PHYSICIAN ASSISTANT

## 2023-11-03 PROCEDURE — PNV: Performed by: PHYSICIAN ASSISTANT

## 2023-11-03 RX ORDER — FAMOTIDINE 10 MG
10 TABLET ORAL 2 TIMES DAILY
COMMUNITY

## 2023-11-03 NOTE — ASSESSMENT & PLAN NOTE
Reviewed RSV vaccine with patient and  in length. Reviewed recommendation to have between 32wk and 36w6d. Will check with insurance and consider for next visit. Continue routine prenatal care. Return to office for ob check in 2 weeks.

## 2023-11-03 NOTE — PROGRESS NOTES
Routine Prenatal Visit  [address] [place] [address], [address], [facility], [address] [facility],8W    Assessment/Plan:  Pilar Wallace is a 34y.o. year old  at 33w3d who presents for routine prenatal visit. 1. Genital herpes affecting pregnancy, antepartum  Assessment & Plan: Will plan to start Valtrex at 36 weeks. 2. Primigravida of advanced maternal age in third trimester  Assessment & Plan:  Stop  mg at 36 weeks. Initiation of  testing at 36 weeks. 3. Gastroesophageal reflux during pregnancy in second trimester, antepartum    4. Obesity affecting pregnancy in third trimester, unspecified obesity type    5. 33 weeks gestation of pregnancy  Assessment & Plan:  Reviewed RSV vaccine with patient and  in length. Reviewed recommendation to have between 32wk and 36w6d. Will check with insurance and consider for next visit. Continue routine prenatal care. Return to office for ob check in 2 weeks. Orders:  -     POCT urine dip        Next OB Visit 2 weeks. Subjective:     CC: Prenatal care    David Ramos is a 34 y.o.  female who presents for routine prenatal care at 33w3d. Pregnancy ROS: Good FM, No N/V, HA, cramping, VB, LOF, edema, domestic violence, or smoking. Tolerating PNV.       The following portions of the patient's history were reviewed and updated as appropriate: allergies, current medications, past family history, past medical history, obstetric history, gynecologic history, past social history, past surgical history and problem list.      Objective:  /62 (BP Location: Right arm, Patient Position: Sitting, Cuff Size: Standard)   Ht 5' 1" (1.549 m)   Wt 86.6 kg (191 lb)   LMP 2023 (Approximate)   BMI 36.09 kg/m²   Pregravid Weight/BMI: 72.6 kg (160 lb) (BMI 30.25)  Current Weight: 86.6 kg (191 lb)   Total Weight Gain: 14.1 kg (31 lb)   Pre-Iraida Vitals      Flowsheet Row Most Recent Value   Prenatal Assessment    Fetal Heart Rate 130   Fundal Height (cm) 33 cm   Movement Present   Presentation Vertex   Prenatal Vitals    Blood Pressure 112/62   Weight - Scale 86.6 kg (191 lb)   Urine Albumin/Glucose    Dilation/Effacement/Station    Vaginal Drainage    Edema    LLE Edema None   RLE Edema None   Facial Edema None             General: Well appearing, no distress  Abdomen: Soft, gravid, nontender  Fundal Height: Appropriate for gestational age. Extremities: Warm and well perfused. Non tender.

## 2023-11-09 ENCOUNTER — PATIENT MESSAGE (OUTPATIENT)
Dept: OBGYN CLINIC | Facility: CLINIC | Age: 38
End: 2023-11-09

## 2023-11-09 NOTE — PATIENT COMMUNICATION
Return call from Caitlin Flowers, she has noticed soft squishy bump in area of perineum. Denies pain, drainage, warm to touch. Reviewed possibly vaginal varicosity. Recommended pelvic for evaluation at time of appt on Monday to confirm. Reviewed undergarments are available that provide vaginal support. Denies vaginal bleeding/spotting/LOF/ +FM.

## 2023-11-13 ENCOUNTER — ROUTINE PRENATAL (OUTPATIENT)
Dept: OBGYN CLINIC | Facility: CLINIC | Age: 38
End: 2023-11-13
Payer: COMMERCIAL

## 2023-11-13 VITALS — DIASTOLIC BLOOD PRESSURE: 56 MMHG | WEIGHT: 194.6 LBS | SYSTOLIC BLOOD PRESSURE: 100 MMHG | BODY MASS INDEX: 36.77 KG/M2

## 2023-11-13 DIAGNOSIS — O99.612 GASTROESOPHAGEAL REFLUX DURING PREGNANCY IN SECOND TRIMESTER, ANTEPARTUM: ICD-10-CM

## 2023-11-13 DIAGNOSIS — O99.213 OBESITY AFFECTING PREGNANCY IN THIRD TRIMESTER, UNSPECIFIED OBESITY TYPE: ICD-10-CM

## 2023-11-13 DIAGNOSIS — O09.513 PRIMIGRAVIDA OF ADVANCED MATERNAL AGE IN THIRD TRIMESTER: Primary | ICD-10-CM

## 2023-11-13 DIAGNOSIS — O98.319 GENITAL HERPES AFFECTING PREGNANCY, ANTEPARTUM: ICD-10-CM

## 2023-11-13 DIAGNOSIS — A60.09 GENITAL HERPES AFFECTING PREGNANCY, ANTEPARTUM: ICD-10-CM

## 2023-11-13 DIAGNOSIS — K21.9 GASTROESOPHAGEAL REFLUX DURING PREGNANCY IN SECOND TRIMESTER, ANTEPARTUM: ICD-10-CM

## 2023-11-13 DIAGNOSIS — O22.10 VULVAR VARICES DURING PREGNANCY: ICD-10-CM

## 2023-11-13 DIAGNOSIS — Z3A.34 34 WEEKS GESTATION OF PREGNANCY: ICD-10-CM

## 2023-11-13 LAB
SL AMB  POCT GLUCOSE, UA: NEGATIVE
SL AMB POCT URINE PROTEIN: NEGATIVE

## 2023-11-13 PROCEDURE — 81002 URINALYSIS NONAUTO W/O SCOPE: CPT | Performed by: STUDENT IN AN ORGANIZED HEALTH CARE EDUCATION/TRAINING PROGRAM

## 2023-11-13 PROCEDURE — PNV: Performed by: STUDENT IN AN ORGANIZED HEALTH CARE EDUCATION/TRAINING PROGRAM

## 2023-11-13 RX ORDER — VALACYCLOVIR HYDROCHLORIDE 500 MG/1
500 TABLET, FILM COATED ORAL 2 TIMES DAILY
Qty: 60 TABLET | Refills: 1 | Status: SHIPPED | OUTPATIENT
Start: 2023-11-13 | End: 2024-01-12

## 2023-11-13 NOTE — ASSESSMENT & PLAN NOTE
- Patient description of symptoms is consistent with vulvar varicosity. No engorged vessel on exam today, but she reports this is positional, as expected. Comfort measures discussed.

## 2023-11-13 NOTE — PROGRESS NOTES
Routine Prenatal Visit  [address]  [address] [place] [address], [place], [address]    Assessment/Plan:  Morena Raya is a 34y.o. year old  at 29w9d who presents for routine prenatal visit. 3. Primigravida of advanced maternal age in third trimester  Assessment & Plan:  - Continue  mg PO daily  - Plan for initiation of once weekly NST/NATHAN beginning at 36 weeks. Order provided today. Orders:  -     Amniotic fluid index with NST; Standing    2. Vulvar varices during pregnancy  Assessment & Plan:  - Patient description of symptoms is consistent with vulvar varicosity. No engorged vessel on exam today, but she reports this is positional, as expected. Comfort measures discussed. 3. Genital herpes affecting pregnancy, antepartum  Assessment & Plan:  - Recommend Valtrex suppression beginning at 36 weeks. Rx provided today. Orders:  -     valACYclovir (VALTREX) 500 mg tablet; Take 1 tablet (500 mg total) by mouth 2 (two) times a day    4. Gastroesophageal reflux during pregnancy in second trimester, antepartum    5. Obesity affecting pregnancy in third trimester, unspecified obesity type    6. 34 weeks gestation of pregnancy  Assessment & Plan:  - PTL/PPROM/Bleeding precautions given. Kick counts reviewed. - Reviewed plan for collection of GBS swab at 36 weeks. - Problem list updated, results console reviewed and updated with pertinent prenatal labs. - PMH, PSH, medications reviewed and updated as needed  - Return to office in 2 wk(s) for routine prenatal care      Orders:  -     POCT urine dip      Subjective:   Samuel Delgado is a 34 y.o. Jonatan Holder who presents for routine prenatal care at 34w6d.   Complaints today: Bump on labia  LOF: No; VB: No; Contractions: No; FM: Present and normal    Objective:  /56   Wt 88.3 kg (194 lb 9.6 oz)   LMP 2023 (Approximate)   BMI 36.77 kg/m²     General: Well appearing, no distress  Respiratory: Unlabored breathing  Cardiovascular: Regular rate. Abdomen: Soft, gravid, nontender  Extremities: Warm and well perfused. Non tender.     Pregravid Weight/BMI: 72.6 kg (160 lb) (BMI 30.25)  Current Weight: 88.3 kg (194 lb 9.6 oz)   Total Weight Gain: 15.7 kg (34 lb 9.6 oz)     Pre-Iraida Vitals      Flowsheet Row Most Recent Value   Prenatal Assessment    Fetal Heart Rate 140   Fundal Height (cm) 34 cm   Movement Present   Presentation Vertex   Prenatal Vitals    Blood Pressure 100/56   Weight - Scale 88.3 kg (194 lb 9.6 oz)   Urine Albumin/Glucose    Dilation/Effacement/Station    Vaginal Drainage    Draining Fluid No   Edema    LLE Edema None   RLE Edema None   Facial Edema None             Lawson Fitzpatrick MD  2023 5:30 PM

## 2023-11-13 NOTE — ASSESSMENT & PLAN NOTE
- PTL/PPROM/Bleeding precautions given. Kick counts reviewed. - Reviewed plan for collection of GBS swab at 36 weeks. - Problem list updated, results console reviewed and updated with pertinent prenatal labs.   - PMH, PSH, medications reviewed and updated as needed  - Return to office in 2 wk(s) for routine prenatal care

## 2023-11-13 NOTE — ASSESSMENT & PLAN NOTE
- Continue  mg PO daily  - Plan for initiation of once weekly NST/NATHAN beginning at 36 weeks. Order provided today.

## 2023-11-17 ENCOUNTER — ULTRASOUND (OUTPATIENT)
Dept: PERINATAL CARE | Facility: OTHER | Age: 38
End: 2023-11-17
Payer: COMMERCIAL

## 2023-11-17 VITALS
DIASTOLIC BLOOD PRESSURE: 60 MMHG | HEART RATE: 94 BPM | HEIGHT: 61 IN | BODY MASS INDEX: 37.08 KG/M2 | WEIGHT: 196.4 LBS | SYSTOLIC BLOOD PRESSURE: 108 MMHG

## 2023-11-17 DIAGNOSIS — O09.513 PRIMIGRAVIDA OF ADVANCED MATERNAL AGE IN THIRD TRIMESTER: ICD-10-CM

## 2023-11-17 DIAGNOSIS — O99.213 OBESITY AFFECTING PREGNANCY IN THIRD TRIMESTER, UNSPECIFIED OBESITY TYPE: ICD-10-CM

## 2023-11-17 DIAGNOSIS — Z3A.35 35 WEEKS GESTATION OF PREGNANCY: Primary | ICD-10-CM

## 2023-11-17 PROCEDURE — 99213 OFFICE O/P EST LOW 20 MIN: CPT | Performed by: OBSTETRICS & GYNECOLOGY

## 2023-11-17 PROCEDURE — 76816 OB US FOLLOW-UP PER FETUS: CPT | Performed by: OBSTETRICS & GYNECOLOGY

## 2023-11-17 NOTE — LETTER
November 20, 2023     Valerio Patricia MD  [address] [place] [address]  [address] [facility]    Patient: Andrés Alvarez   YOB: 1985   Date of Visit: 11/17/2023       Dear Dr. Lisa Gilliam:    Thank you for referring Andrés Alvarez to me for evaluation. Below are my notes for this consultation. If you have questions, please do not hesitate to call me. I look forward to following your patient along with you. Sincerely,        Jacques Deal MD        CC: No Recipients    Jacques Deal MD  11/20/2023  8:06 AM  Sign when Signing Visit  A fetal ultrasound was completed. See Ob procedures in Epic for an interpretation and recommendations. Do not hesitate to contact us in [facility] if you have questions. Johana Herman MD, [address] [facility]  Maternal Fetal Medicine

## 2023-11-20 PROBLEM — Z3A.35 35 WEEKS GESTATION OF PREGNANCY: Status: ACTIVE | Noted: 2023-06-26

## 2023-11-20 NOTE — PROGRESS NOTES
A fetal ultrasound was completed. See Ob procedures in Epic for an interpretation and recommendations. Do not hesitate to contact us in [facility] if you have questions. Sima Lugo MD, [address] [facility]  Maternal Fetal Medicine

## 2023-11-21 ENCOUNTER — TELEPHONE (OUTPATIENT)
Dept: POSTPARTUM | Facility: CLINIC | Age: 38
End: 2023-11-21

## 2023-11-22 ENCOUNTER — TELEPHONE (OUTPATIENT)
Facility: HOSPITAL | Age: 38
End: 2023-11-22

## 2023-11-24 ENCOUNTER — ULTRASOUND (OUTPATIENT)
Dept: PERINATAL CARE | Facility: OTHER | Age: 38
End: 2023-11-24
Payer: COMMERCIAL

## 2023-11-24 VITALS
DIASTOLIC BLOOD PRESSURE: 54 MMHG | SYSTOLIC BLOOD PRESSURE: 98 MMHG | WEIGHT: 198 LBS | HEART RATE: 89 BPM | HEIGHT: 61 IN | BODY MASS INDEX: 37.38 KG/M2

## 2023-11-24 DIAGNOSIS — Z3A.36 36 WEEKS GESTATION OF PREGNANCY: Primary | ICD-10-CM

## 2023-11-24 DIAGNOSIS — O09.523 ELDERLY MULTIGRAVIDA, THIRD TRIMESTER: ICD-10-CM

## 2023-11-24 PROCEDURE — 59025 FETAL NON-STRESS TEST: CPT | Performed by: OBSTETRICS & GYNECOLOGY

## 2023-11-24 PROCEDURE — 76815 OB US LIMITED FETUS(S): CPT | Performed by: OBSTETRICS & GYNECOLOGY

## 2023-11-24 NOTE — LETTER
November 24, 2023     Narinder Leon MD  [address] [place] [address]  [address] [facility]    Patient: Yesenia Gonzalez   YOB: 1985   Date of Visit: 11/24/2023       Dear Dr. Velazquez Graft:    Thank you for referring Yesenia Gonzalez to me for evaluation. Below are my notes for this consultation. If you have questions, please do not hesitate to call me. I look forward to following your patient along with you.          Sincerely,        Maxine Blackburn MD        CC: No Recipients    Maxine Blackburn MD  11/24/2023  3:47 PM  Sign when Signing Visit  Please refer to the [facility] ultrasound report in Ob Procedures for additional information regarding today's visit

## 2023-11-24 NOTE — PROGRESS NOTES
Non-Stress Testing:    Non-Stress test, equipment, procedure, and expected outcomes explained. Reviewed fetal kick counts and when to call OB. Verified patient understanding of fetal kick counts with teach back method. Patient reports feeling daily fetal movements. Patient has no questions or concerns. Dr. Yris Myers  viewed NST strip prior to completion of visit.

## 2023-11-27 ENCOUNTER — ROUTINE PRENATAL (OUTPATIENT)
Dept: OBGYN CLINIC | Facility: CLINIC | Age: 38
End: 2023-11-27
Payer: COMMERCIAL

## 2023-11-27 ENCOUNTER — CLINICAL SUPPORT (OUTPATIENT)
Dept: POSTPARTUM | Facility: CLINIC | Age: 38
End: 2023-11-27

## 2023-11-27 VITALS — SYSTOLIC BLOOD PRESSURE: 104 MMHG | BODY MASS INDEX: 37.34 KG/M2 | WEIGHT: 197.6 LBS | DIASTOLIC BLOOD PRESSURE: 58 MMHG

## 2023-11-27 DIAGNOSIS — Z3A.36 36 WEEKS GESTATION OF PREGNANCY: ICD-10-CM

## 2023-11-27 DIAGNOSIS — A60.09 GENITAL HERPES AFFECTING PREGNANCY, ANTEPARTUM: ICD-10-CM

## 2023-11-27 DIAGNOSIS — O98.319 GENITAL HERPES AFFECTING PREGNANCY, ANTEPARTUM: ICD-10-CM

## 2023-11-27 DIAGNOSIS — O99.213 OBESITY AFFECTING PREGNANCY IN THIRD TRIMESTER, UNSPECIFIED OBESITY TYPE: ICD-10-CM

## 2023-11-27 DIAGNOSIS — Z36.85 ANTENATAL SCREENING FOR STREPTOCOCCUS B: ICD-10-CM

## 2023-11-27 DIAGNOSIS — O99.613 GASTROESOPHAGEAL REFLUX DURING PREGNANCY IN THIRD TRIMESTER, ANTEPARTUM: ICD-10-CM

## 2023-11-27 DIAGNOSIS — K21.9 GASTROESOPHAGEAL REFLUX DURING PREGNANCY IN THIRD TRIMESTER, ANTEPARTUM: ICD-10-CM

## 2023-11-27 DIAGNOSIS — Z29.11 NEED FOR RSV VACCINATION: ICD-10-CM

## 2023-11-27 DIAGNOSIS — Z32.2 ENCOUNTER FOR CHILDBIRTH INSTRUCTION: Primary | ICD-10-CM

## 2023-11-27 DIAGNOSIS — O09.513 PRIMIGRAVIDA OF ADVANCED MATERNAL AGE IN THIRD TRIMESTER: Primary | ICD-10-CM

## 2023-11-27 LAB
SL AMB  POCT GLUCOSE, UA: NEGATIVE
SL AMB POCT URINE PROTEIN: NEGATIVE

## 2023-11-27 PROCEDURE — 90678 RSV VACC PREF BIVALENT IM: CPT | Performed by: STUDENT IN AN ORGANIZED HEALTH CARE EDUCATION/TRAINING PROGRAM

## 2023-11-27 PROCEDURE — 81002 URINALYSIS NONAUTO W/O SCOPE: CPT | Performed by: STUDENT IN AN ORGANIZED HEALTH CARE EDUCATION/TRAINING PROGRAM

## 2023-11-27 PROCEDURE — 90471 IMMUNIZATION ADMIN: CPT | Performed by: STUDENT IN AN ORGANIZED HEALTH CARE EDUCATION/TRAINING PROGRAM

## 2023-11-27 PROCEDURE — PNV: Performed by: STUDENT IN AN ORGANIZED HEALTH CARE EDUCATION/TRAINING PROGRAM

## 2023-11-27 RX ORDER — VALACYCLOVIR HYDROCHLORIDE 500 MG/1
500 TABLET, FILM COATED ORAL 2 TIMES DAILY
Qty: 180 TABLET | Refills: 0 | Status: SHIPPED | OUTPATIENT
Start: 2023-11-27 | End: 2024-02-25

## 2023-11-27 NOTE — PROGRESS NOTES
Routine Prenatal Visit  [address] [place] [address], [address], [facility], [address] [facility],8W    Assessment/Plan:  Benjy Hu is a 34y.o. year old  at 40w7d who presents for routine prenatal visit. 3. Primigravida of advanced maternal age in third trimester  Assessment & Plan:  - Continue weekly APFS until delivery. 2. Genital herpes affecting pregnancy, antepartum  Assessment & Plan:  - Continue twice daily suppression until delivery. 3. Obesity affecting pregnancy in third trimester, unspecified obesity type    4. 36 weeks gestation of pregnancy  Assessment & Plan:  - Labor/Bleeding/ROM precautions given. Kick counts reviewed  - GBS swab collected today. - Delivery consent reviewed and signed today. Risks of delivery reviewed, including bleeding, infection, damage to surrounding organs/structures (specifically bowel, bladder, vessels, nerves, ureters), need for operative vaginal delivery or  delivery, hysterectomy, need for blood transfusion, need for further surgery.   - RSV vaccine discussed and administered today. - Problem list updated, results console reviewed and updated with pertinent prenatal labs. - PMH, PSH, medications reviewed and updated as needed  - Return to office in 1 wk(s) for routine prenatal care      Orders:  -     POCT urine dip    5. Gastroesophageal reflux during pregnancy in third trimester, antepartum    6.  screening for streptococcus B  -     Strep Gp B RANJEET+Rflx    7. Need for RSV vaccination  -     Respiratory Syncytial Virus (RSV) vaccine (recombinant) Srinivas Whitley)          Subjective:   Janiya Khoury is a 34 y.o. Kitty Rosado who presents for routine prenatal care at 36w6d.   Complaints today: No  LOF: No; VB: No; Contractions: No; FM: Present and normal    Objective:  /58   Wt 89.6 kg (197 lb 9.6 oz)   LMP 2023 (Approximate)   BMI 37.34 kg/m²     General: Well appearing, no distress  Respiratory: Unlabored breathing  Cardiovascular: Regular rate. Abdomen: Soft, gravid, nontender  Extremities: Warm and well perfused. Non tender.     Pregravid Weight/BMI: 72.6 kg (160 lb) (BMI 30.25)  Current Weight: 89.6 kg (197 lb 9.6 oz)   Total Weight Gain: 17.1 kg (37 lb 9.6 oz)     Pre-Iraida Vitals      Flowsheet Row Most Recent Value   Prenatal Assessment    Fetal Heart Rate 140   Fundal Height (cm) 37 cm   Movement Present   Presentation Vertex   Prenatal Vitals    Blood Pressure 104/58   Weight - Scale 89.6 kg (197 lb 9.6 oz)   Urine Albumin/Glucose    Dilation/Effacement/Station    Cervical Dilation 0   Cervical Effacement 0   Fetal Station -4   Vaginal Drainage    Draining Fluid No   Edema    LLE Edema None   RLE Edema None   Facial Edema None             Lynn Poole MD  2023 4:49 PM

## 2023-11-27 NOTE — ASSESSMENT & PLAN NOTE
- Labor/Bleeding/ROM precautions given. Kick counts reviewed  - GBS swab collected today. - Delivery consent reviewed and signed today. Risks of delivery reviewed, including bleeding, infection, damage to surrounding organs/structures (specifically bowel, bladder, vessels, nerves, ureters), need for operative vaginal delivery or  delivery, hysterectomy, need for blood transfusion, need for further surgery.   - RSV vaccine discussed and administered today. - Problem list updated, results console reviewed and updated with pertinent prenatal labs.   - PMH, PSH, medications reviewed and updated as needed  - Return to office in 1 wk(s) for routine prenatal care

## 2023-12-01 ENCOUNTER — ULTRASOUND (OUTPATIENT)
Dept: PERINATAL CARE | Facility: OTHER | Age: 38
End: 2023-12-01
Payer: COMMERCIAL

## 2023-12-01 VITALS
SYSTOLIC BLOOD PRESSURE: 112 MMHG | HEIGHT: 61 IN | DIASTOLIC BLOOD PRESSURE: 60 MMHG | HEART RATE: 81 BPM | WEIGHT: 195.2 LBS | BODY MASS INDEX: 36.85 KG/M2

## 2023-12-01 DIAGNOSIS — O09.513 PRIMIGRAVIDA OF ADVANCED MATERNAL AGE IN THIRD TRIMESTER: Primary | ICD-10-CM

## 2023-12-01 DIAGNOSIS — Z3A.37 37 WEEKS GESTATION OF PREGNANCY: ICD-10-CM

## 2023-12-01 PROBLEM — O99.820 GROUP B STREPTOCOCCUS CARRIER, +RV CULTURE, CURRENTLY PREGNANT: Status: ACTIVE | Noted: 2023-12-01

## 2023-12-01 LAB — GP B STREP DNA SPEC QL NAA+PROBE: POSITIVE

## 2023-12-01 PROCEDURE — 59025 FETAL NON-STRESS TEST: CPT | Performed by: OBSTETRICS & GYNECOLOGY

## 2023-12-01 PROCEDURE — 76815 OB US LIMITED FETUS(S): CPT | Performed by: OBSTETRICS & GYNECOLOGY

## 2023-12-04 NOTE — PROGRESS NOTES
[facility]IC Progress Note    Date: 2023    Name: Hang Hogue    MRN: [id number]         : 1999      Mr. Nadira Freire arrived in the [facility] today at 21 , here for his Every 29 Day (Every 4 Week), IM Abilify Injection. He was assessed and education was provided. Mr. Denton Gonzalez vitals were reviewed. Visit Vitals  /80 (BP 1 Location: Left upper arm, BP Patient Position: Sitting)   Pulse (!) 110   Temp 97.8 °F (36.6 °C)   Resp 18   SpO2 100%     ABILIFY MAINTENA (Aripiprazole) 400 mg was given IM in his Left Deltoid. Band-Aid to site. Mr. Nadira Freire tolerated well, and had no complaints. Armband removed and shredded. Mr. Nadira Freire was discharged from [place] [address] in stable condition at 1100. He is to return on 23 at 1100, for his next appointment, for his next Abilify Injection.      Dariel Albarran RN  2023 The patient was seen today for an ultrasound and NST. Please see ultrasound report (located under OB Procedures tab) for additional details.

## 2023-12-06 ENCOUNTER — ROUTINE PRENATAL (OUTPATIENT)
Dept: OBGYN CLINIC | Facility: CLINIC | Age: 38
End: 2023-12-06
Payer: COMMERCIAL

## 2023-12-06 VITALS
HEIGHT: 61 IN | BODY MASS INDEX: 37.31 KG/M2 | WEIGHT: 197.6 LBS | DIASTOLIC BLOOD PRESSURE: 56 MMHG | SYSTOLIC BLOOD PRESSURE: 92 MMHG

## 2023-12-06 DIAGNOSIS — O98.319 GENITAL HERPES AFFECTING PREGNANCY, ANTEPARTUM: ICD-10-CM

## 2023-12-06 DIAGNOSIS — O09.513 PRIMIGRAVIDA OF ADVANCED MATERNAL AGE IN THIRD TRIMESTER: ICD-10-CM

## 2023-12-06 DIAGNOSIS — O99.613 GASTROESOPHAGEAL REFLUX DURING PREGNANCY IN THIRD TRIMESTER, ANTEPARTUM: ICD-10-CM

## 2023-12-06 DIAGNOSIS — A60.09 GENITAL HERPES AFFECTING PREGNANCY, ANTEPARTUM: ICD-10-CM

## 2023-12-06 DIAGNOSIS — O22.10 VULVAR VARICES DURING PREGNANCY: ICD-10-CM

## 2023-12-06 DIAGNOSIS — O99.820 GROUP B STREPTOCOCCUS CARRIER, +RV CULTURE, CURRENTLY PREGNANT: Primary | ICD-10-CM

## 2023-12-06 DIAGNOSIS — O99.213 OBESITY AFFECTING PREGNANCY IN THIRD TRIMESTER, UNSPECIFIED OBESITY TYPE: ICD-10-CM

## 2023-12-06 DIAGNOSIS — K21.9 GASTROESOPHAGEAL REFLUX DURING PREGNANCY IN THIRD TRIMESTER, ANTEPARTUM: ICD-10-CM

## 2023-12-06 DIAGNOSIS — Z3A.38 38 WEEKS GESTATION OF PREGNANCY: ICD-10-CM

## 2023-12-06 LAB
SL AMB  POCT GLUCOSE, UA: NORMAL
SL AMB POCT URINE PROTEIN: NORMAL

## 2023-12-06 PROCEDURE — 81002 URINALYSIS NONAUTO W/O SCOPE: CPT | Performed by: PHYSICIAN ASSISTANT

## 2023-12-06 PROCEDURE — PNV: Performed by: PHYSICIAN ASSISTANT

## 2023-12-07 NOTE — ASSESSMENT & PLAN NOTE
Reviewed option of IOL at 39 weeks. Patient is interested. Unsure if she would like early in 39 weeks or later. IOL process reviewed in length with patient. All questions answered. IOL consent reviewed and signed. Patient would like to wait for growth ultrasound on Friday. Will call with date she would like after ultrasound.

## 2023-12-07 NOTE — PROGRESS NOTES
Routine Prenatal Visit  [address]  [address] [place] [address], [place], [address]    Assessment/Plan:  Malu Montano is a 34y.o. year old  at 45w2d who presents for routine prenatal visit. 1. Group B Streptococcus carrier, +RV culture, currently pregnant  Assessment & Plan:  GBS positive. Patient Penicillin allergic, chart states anaphylaxis. Patient reports had difficulty breathing, full body rash, palpitations, nausea, vomiting. Has not been on cephalosporins in the past.   Susceptibility testing was canceled by lab. Reviewed case with Dr. David Villarreal. Repeat GBS with susceptibility done today. Orders:  -     Strep Gp B RANJEET+Rflx    2. 38 weeks gestation of pregnancy  Assessment & Plan:  Reviewed option of IOL at 39 weeks. Patient is interested. Unsure if she would like early in 39 weeks or later. IOL process reviewed in length with patient. All questions answered. IOL consent reviewed and signed. Patient would like to wait for growth ultrasound on Friday. Will call with date she would like after ultrasound. Orders:  -     POCT urine dip  -     Strep Gp B RANJEET+Rflx    3. Genital herpes affecting pregnancy, antepartum    4. Primigravida of advanced maternal age in third trimester    5. Gastroesophageal reflux during pregnancy in third trimester, antepartum    6. Obesity affecting pregnancy in third trimester, unspecified obesity type    7. Vulvar varices during pregnancy        Next OB Visit 1 week. Subjective:     CC: Prenatal care    Nohemi Ulloa is a 34 y.o. Janiya Negro female who presents for routine prenatal care at 38w1d. Pregnancy ROS: Good Fm, No N/V, HA, cramping, VB, LOF, edema, domestic violence, or smoking. Tolerating PNV.       The following portions of the patient's history were reviewed and updated as appropriate: allergies, current medications, past family history, past medical history, obstetric history, gynecologic history, past social history, past surgical history and problem list.      Objective:  BP 92/56 (BP Location: Left arm, Patient Position: Sitting, Cuff Size: Standard)   Ht 5' 1" (1.549 m)   Wt 89.6 kg (197 lb 9.6 oz)   LMP 2023 (Approximate)   BMI 37.34 kg/m²   Pregravid Weight/BMI: 72.6 kg (160 lb) (BMI 30.25)  Current Weight: 89.6 kg (197 lb 9.6 oz)   Total Weight Gain: 17.1 kg (37 lb 9.6 oz)   Pre-Iraida Vitals      Flowsheet Row Most Recent Value   Prenatal Assessment    Fetal Heart Rate 130   Fundal Height (cm) 39 cm   Movement Present   Prenatal Vitals    Blood Pressure 92/56   Weight - Scale 89.6 kg (197 lb 9.6 oz)   Urine Albumin/Glucose    Dilation/Effacement/Station    Cervical Dilation 0   Cervical Effacement 0   Fetal Station -3   Vaginal Drainage    Edema    LLE Edema None   RLE Edema None   Facial Edema None             General: Well appearing, no distress  Abdomen: Soft, gravid, nontender  Fundal Height: Appropriate for gestational age. Extremities: Warm and well perfused. Non tender.

## 2023-12-07 NOTE — ASSESSMENT & PLAN NOTE
GBS positive. Patient Penicillin allergic, chart states anaphylaxis. Patient reports had difficulty breathing, full body rash, palpitations, nausea, vomiting. Has not been on cephalosporins in the past.   Susceptibility testing was canceled by lab. Reviewed case with Dr. Marcus Aviles. Repeat GBS with susceptibility done today.

## 2023-12-08 ENCOUNTER — ULTRASOUND (OUTPATIENT)
Dept: PERINATAL CARE | Facility: OTHER | Age: 38
End: 2023-12-08
Payer: COMMERCIAL

## 2023-12-08 VITALS
SYSTOLIC BLOOD PRESSURE: 114 MMHG | DIASTOLIC BLOOD PRESSURE: 52 MMHG | HEIGHT: 61 IN | HEART RATE: 88 BPM | BODY MASS INDEX: 36.85 KG/M2 | WEIGHT: 195.2 LBS

## 2023-12-08 DIAGNOSIS — Z3A.38 38 WEEKS GESTATION OF PREGNANCY: Primary | ICD-10-CM

## 2023-12-08 DIAGNOSIS — O36.63X0 LARGE FOR GESTATIONAL AGE FETUS AFFECTING MOTHER, ANTEPARTUM, THIRD TRIMESTER, SINGLE GESTATION: ICD-10-CM

## 2023-12-08 DIAGNOSIS — O99.213 MATERNAL OBESITY, ANTEPARTUM, THIRD TRIMESTER: ICD-10-CM

## 2023-12-08 DIAGNOSIS — O09.513 PRIMIGRAVIDA OF ADVANCED MATERNAL AGE IN THIRD TRIMESTER: ICD-10-CM

## 2023-12-08 PROBLEM — O09.519 ADVANCED MATERNAL AGE, 1ST PREGNANCY: Status: ACTIVE | Noted: 2023-12-08

## 2023-12-08 LAB — GP B STREP DNA SPEC QL NAA+PROBE: NEGATIVE

## 2023-12-08 PROCEDURE — 76816 OB US FOLLOW-UP PER FETUS: CPT | Performed by: OBSTETRICS & GYNECOLOGY

## 2023-12-08 PROCEDURE — 59025 FETAL NON-STRESS TEST: CPT | Performed by: OBSTETRICS & GYNECOLOGY

## 2023-12-08 NOTE — LETTER
December 8, 2023     Saud Mcdaniels MD  [address] [place] [address]  [address] [facility]    Patient: Tasha Jackson   YOB: 1985   Date of Visit: 12/8/2023       Dear Dr. Omar Power:    Thank you for referring Tasha Jackson to me for evaluation. Below are my notes for this consultation. If you have questions, please do not hesitate to call me. I look forward to following your patient along with you.          Sincerely,        Talia Workman MD        CC: No Recipients    Talia Workman MD  12/8/2023  3:06 PM  Sign when Signing Visit  Please refer to the [facility] ultrasound report in Ob Procedures for additional information regarding today's visit

## 2023-12-08 NOTE — PROGRESS NOTES
Repeat Non-Stress Testing:    Patient verbalizes +FM. Pt denies ALL:               Leaking of fluid   Contractions   Vaginal bleeding   Decreased fetal movement    Patient is performing daily kick counts. Patient has no questions or concerns. NST strip reviewed by Dr. Elmarie Lombard.

## 2023-12-08 NOTE — PROGRESS NOTES
Please refer to the [facility] ultrasound report in Ob Procedures for additional information regarding today's visit

## 2023-12-11 ENCOUNTER — CLINICAL SUPPORT (OUTPATIENT)
Dept: POSTPARTUM | Facility: CLINIC | Age: 38
End: 2023-12-11

## 2023-12-11 DIAGNOSIS — Z32.2 ENCOUNTER FOR CHILDBIRTH INSTRUCTION: Primary | ICD-10-CM

## 2023-12-13 ENCOUNTER — ULTRASOUND (OUTPATIENT)
Dept: PERINATAL CARE | Facility: OTHER | Age: 38
End: 2023-12-13
Payer: COMMERCIAL

## 2023-12-13 VITALS
DIASTOLIC BLOOD PRESSURE: 66 MMHG | HEIGHT: 61 IN | SYSTOLIC BLOOD PRESSURE: 118 MMHG | WEIGHT: 196 LBS | BODY MASS INDEX: 37 KG/M2 | HEART RATE: 91 BPM

## 2023-12-13 DIAGNOSIS — O09.513 PRIMIGRAVIDA OF ADVANCED MATERNAL AGE IN THIRD TRIMESTER: Primary | ICD-10-CM

## 2023-12-13 DIAGNOSIS — Z3A.39 39 WEEKS GESTATION OF PREGNANCY: ICD-10-CM

## 2023-12-13 PROCEDURE — 76815 OB US LIMITED FETUS(S): CPT | Performed by: OBSTETRICS & GYNECOLOGY

## 2023-12-13 PROCEDURE — 59025 FETAL NON-STRESS TEST: CPT | Performed by: OBSTETRICS & GYNECOLOGY

## 2023-12-13 NOTE — PROGRESS NOTES
Please refer to the [facility] ultrasound report in Ob Procedures for additional information regarding today's visit

## 2023-12-13 NOTE — LETTER
December 13, 2023     Puja Mc MD  [address] [place] [address] 4  [address] [facility]    Patient: Samuel Delgado   YOB: 1985   Date of Visit: 12/13/2023       Dear Dr. Evans Miller:    Thank you for referring Samuel Delgado to me for evaluation. Below are my notes for this consultation. If you have questions, please do not hesitate to call me. I look forward to following your patient along with you.          Sincerely,        Gem Saldana MD        CC: No Recipients    Gem Saldana MD  12/13/2023  3:28 PM  Sign when Signing Visit  Please refer to the [facility] ultrasound report in Ob Procedures for additional information regarding today's visit

## 2023-12-14 ENCOUNTER — ROUTINE PRENATAL (OUTPATIENT)
Dept: OBGYN CLINIC | Facility: CLINIC | Age: 38
End: 2023-12-14
Payer: COMMERCIAL

## 2023-12-14 VITALS — SYSTOLIC BLOOD PRESSURE: 90 MMHG | DIASTOLIC BLOOD PRESSURE: 62 MMHG | BODY MASS INDEX: 37.49 KG/M2 | WEIGHT: 198.4 LBS

## 2023-12-14 DIAGNOSIS — O98.319 GENITAL HERPES AFFECTING PREGNANCY, ANTEPARTUM: ICD-10-CM

## 2023-12-14 DIAGNOSIS — A60.09 GENITAL HERPES AFFECTING PREGNANCY, ANTEPARTUM: ICD-10-CM

## 2023-12-14 DIAGNOSIS — Z3A.39 39 WEEKS GESTATION OF PREGNANCY: ICD-10-CM

## 2023-12-14 DIAGNOSIS — O09.513 PRIMIGRAVIDA OF ADVANCED MATERNAL AGE IN THIRD TRIMESTER: Primary | ICD-10-CM

## 2023-12-14 DIAGNOSIS — O99.213 OBESITY AFFECTING PREGNANCY IN THIRD TRIMESTER, UNSPECIFIED OBESITY TYPE: ICD-10-CM

## 2023-12-14 DIAGNOSIS — O99.820 GROUP B STREPTOCOCCUS CARRIER, +RV CULTURE, CURRENTLY PREGNANT: ICD-10-CM

## 2023-12-14 LAB
SL AMB  POCT GLUCOSE, UA: NEGATIVE
SL AMB POCT URINE PROTEIN: NEGATIVE

## 2023-12-14 PROCEDURE — PNV: Performed by: OBSTETRICS & GYNECOLOGY

## 2023-12-14 PROCEDURE — 81002 URINALYSIS NONAUTO W/O SCOPE: CPT | Performed by: OBSTETRICS & GYNECOLOGY

## 2023-12-14 NOTE — PROGRESS NOTES
Routine Prenatal Visit  [address] [place] [address], [place], [address]    Assessment/Plan:  Trevin Salgado is a 34y.o. year old  at 45w4d who presents for routine prenatal visit. 3. Primigravida of advanced maternal age in third trimester    2. Genital herpes affecting pregnancy, antepartum    3. Obesity affecting pregnancy in third trimester, unspecified obesity type    4. Group B Streptococcus carrier, +RV culture, currently pregnant    5. 39 weeks gestation of pregnancy  Assessment & Plan:  Cervical ripening scheduled for this 23 with induction for the following AM. Reviewed procedure and consent signed. Orders:  -     POCT urine dip          Subjective:     CC: Prenatal care    Noah No is a 34 y.o. [facility] female who presents for routine prenatal care at 39w2d. Pregnancy ROS: no leakage of fluid, pelvic pain, or vaginal bleeding. normal fetal movement.     The following portions of the patient's history were reviewed and updated as appropriate: allergies, current medications, past family history, past medical history, obstetric history, gynecologic history, past social history, past surgical history and problem list.      Objective:  BP 90/62   Wt 90 kg (198 lb 6.4 oz)   LMP 2023 (Approximate)   BMI 37.49 kg/m²   Pregravid Weight/BMI: 72.6 kg (160 lb) (BMI 30.25)  Current Weight: 90 kg (198 lb 6.4 oz)   Total Weight Gain: 17.4 kg (38 lb 6.4 oz)   Pre-Iraida Vitals      Flowsheet Row Most Recent Value   Prenatal Assessment    Fetal Heart Rate 135   Fundal Height (cm) 41 cm   Movement Present   Presentation Vertex   Prenatal Vitals    Blood Pressure 90/62   Weight - Scale 90 kg (198 lb 6.4 oz)   Urine Albumin/Glucose    Dilation/Effacement/Station    Cervical Dilation 0   Cervical Effacement 0   Fetal Station -3   Vaginal Drainage    Edema              General: Well appearing, no distress  Respiratory: Unlabored breathing  Cardiovascular: Regular rate.  Abdomen: Soft, gravid, nontender  Fundal Height: Appropriate for gestational age. Extremities: Warm and well perfused. Non tender.

## 2023-12-14 NOTE — ASSESSMENT & PLAN NOTE
Cervical ripening scheduled for this Sunday 12/17/23 with induction for the following AM. Reviewed procedure and consent signed.

## 2023-12-17 ENCOUNTER — HOSPITAL ENCOUNTER (OUTPATIENT)
Dept: LABOR AND DELIVERY | Facility: HOSPITAL | Age: 38
Discharge: HOME | End: 2023-12-17
Payer: COMMERCIAL

## 2023-12-17 ENCOUNTER — HOSPITAL ENCOUNTER (INPATIENT)
Facility: HOSPITAL | Age: 38
LOS: 5 days | Discharge: HOME | End: 2023-12-22
Attending: OBSTETRICS & GYNECOLOGY | Admitting: OBSTETRICS & GYNECOLOGY
Payer: COMMERCIAL

## 2023-12-17 DIAGNOSIS — Z98.891 STATUS POST PRIMARY LOW TRANSVERSE CESAREAN SECTION: Primary | ICD-10-CM

## 2023-12-17 LAB
ABO GROUP BLD: NORMAL
BLD GP AB SCN SERPL QL: NEGATIVE
ERYTHROCYTE [DISTWIDTH] IN BLOOD BY AUTOMATED COUNT: 13.7 % (ref 11.6–15.1)
HCT VFR BLD AUTO: 38.9 % (ref 34.8–46.1)
HGB BLD-MCNC: 13.3 G/DL (ref 11.5–15.4)
MCH RBC QN AUTO: 29.8 PG (ref 26.8–34.3)
MCHC RBC AUTO-ENTMCNC: 34.2 G/DL (ref 31.4–37.4)
MCV RBC AUTO: 87 FL (ref 82–98)
PLATELET # BLD AUTO: 201 THOUSANDS/UL (ref 149–390)
PMV BLD AUTO: 11.1 FL (ref 8.9–12.7)
RBC # BLD AUTO: 4.46 MILLION/UL (ref 3.81–5.12)
RH BLD: POSITIVE
SPECIMEN EXPIRATION DATE: NORMAL
WBC # BLD AUTO: 10.46 THOUSAND/UL (ref 4.31–10.16)

## 2023-12-17 PROCEDURE — 86850 RBC ANTIBODY SCREEN: CPT | Performed by: OBSTETRICS & GYNECOLOGY

## 2023-12-17 PROCEDURE — 3E033VJ INTRODUCTION OF OTHER HORMONE INTO PERIPHERAL VEIN, PERCUTANEOUS APPROACH: ICD-10-PCS | Performed by: STUDENT IN AN ORGANIZED HEALTH CARE EDUCATION/TRAINING PROGRAM

## 2023-12-17 PROCEDURE — 85027 COMPLETE CBC AUTOMATED: CPT | Performed by: OBSTETRICS & GYNECOLOGY

## 2023-12-17 PROCEDURE — 3E0P7VZ INTRODUCTION OF HORMONE INTO FEMALE REPRODUCTIVE, VIA NATURAL OR ARTIFICIAL OPENING: ICD-10-PCS | Performed by: STUDENT IN AN ORGANIZED HEALTH CARE EDUCATION/TRAINING PROGRAM

## 2023-12-17 PROCEDURE — 86901 BLOOD TYPING SEROLOGIC RH(D): CPT | Performed by: OBSTETRICS & GYNECOLOGY

## 2023-12-17 PROCEDURE — 86900 BLOOD TYPING SEROLOGIC ABO: CPT | Performed by: OBSTETRICS & GYNECOLOGY

## 2023-12-17 PROCEDURE — NC001 PR NO CHARGE: Performed by: OBSTETRICS & GYNECOLOGY

## 2023-12-17 PROCEDURE — 86780 TREPONEMA PALLIDUM: CPT | Performed by: OBSTETRICS & GYNECOLOGY

## 2023-12-17 PROCEDURE — 4A1HXCZ MONITORING OF PRODUCTS OF CONCEPTION, CARDIAC RATE, EXTERNAL APPROACH: ICD-10-PCS | Performed by: STUDENT IN AN ORGANIZED HEALTH CARE EDUCATION/TRAINING PROGRAM

## 2023-12-17 RX ORDER — ONDANSETRON 2 MG/ML
4 INJECTION INTRAMUSCULAR; INTRAVENOUS EVERY 6 HOURS PRN
Status: DISCONTINUED | OUTPATIENT
Start: 2023-12-17 | End: 2023-12-19

## 2023-12-17 RX ORDER — SODIUM CHLORIDE, SODIUM LACTATE, POTASSIUM CHLORIDE, CALCIUM CHLORIDE 600; 310; 30; 20 MG/100ML; MG/100ML; MG/100ML; MG/100ML
125 INJECTION, SOLUTION INTRAVENOUS CONTINUOUS
Status: DISCONTINUED | OUTPATIENT
Start: 2023-12-17 | End: 2023-12-19

## 2023-12-17 RX ORDER — BUPIVACAINE HYDROCHLORIDE 2.5 MG/ML
30 INJECTION, SOLUTION EPIDURAL; INFILTRATION; INTRACAUDAL ONCE AS NEEDED
Status: DISCONTINUED | OUTPATIENT
Start: 2023-12-17 | End: 2023-12-19

## 2023-12-17 RX ADMIN — Medication 25 MCG: at 22:15

## 2023-12-18 LAB — TREPONEMA PALLIDUM IGG+IGM AB [PRESENCE] IN SERUM OR PLASMA BY IMMUNOASSAY: NORMAL

## 2023-12-18 RX ORDER — VALACYCLOVIR HYDROCHLORIDE 500 MG/1
500 TABLET, FILM COATED ORAL EVERY 12 HOURS SCHEDULED
Status: DISCONTINUED | OUTPATIENT
Start: 2023-12-18 | End: 2023-12-19

## 2023-12-18 RX ORDER — HYDROCORTISONE 25 MG/G
CREAM TOPICAL 4 TIMES DAILY PRN
Status: DISCONTINUED | OUTPATIENT
Start: 2023-12-18 | End: 2023-12-19

## 2023-12-18 RX ADMIN — SODIUM CHLORIDE, SODIUM LACTATE, POTASSIUM CHLORIDE, AND CALCIUM CHLORIDE 125 ML/HR: .6; .31; .03; .02 INJECTION, SOLUTION INTRAVENOUS at 15:59

## 2023-12-18 RX ADMIN — Medication 25 MCG: at 08:01

## 2023-12-18 RX ADMIN — SODIUM CHLORIDE, SODIUM LACTATE, POTASSIUM CHLORIDE, AND CALCIUM CHLORIDE 125 ML/HR: .6; .31; .03; .02 INJECTION, SOLUTION INTRAVENOUS at 08:03

## 2023-12-18 RX ADMIN — VALACYCLOVIR HYDROCHLORIDE 500 MG: 500 TABLET, FILM COATED ORAL at 03:00

## 2023-12-18 RX ADMIN — Medication 25 MCG: at 03:00

## 2023-12-18 RX ADMIN — Medication 25 MCG: at 23:43

## 2023-12-18 RX ADMIN — VALACYCLOVIR HYDROCHLORIDE 500 MG: 500 TABLET, FILM COATED ORAL at 21:32

## 2023-12-18 RX ADMIN — Medication 25 MCG: at 19:13

## 2023-12-18 RX ADMIN — HYDROCORTISONE 2.5% 1 APPLICATION: 25 CREAM TOPICAL at 18:36

## 2023-12-18 NOTE — PLAN OF CARE
Problem: BIRTH - VAGINAL/ SECTION  Goal: Fetal and maternal status remain reassuring during the birth process  Description: INTERVENTIONS:  - Monitor vital signs  - Monitor fetal heart rate  - Monitor uterine activity  - Monitor labor progression (vaginal delivery)  - DVT prophylaxis  - Antibiotic prophylaxis  Outcome: Progressing  Goal: Emotionally satisfying birthing experience for mother/fetus  Description: Interventions:  - Assess, plan, implement and evaluate the nursing care given to the patient in labor  - Advocate the philosophy that each childbirth experience is a unique experience and support the family's chosen level of involvement and control during the labor process   - Actively participate in both the patient's and family's teaching of the birth process  - Consider cultural, Religion and age-specific factors and plan care for the patient in labor  Outcome: Progressing     Problem: PAIN - ADULT  Goal: Verbalizes/displays adequate comfort level or baseline comfort level  Description: Interventions:  - Encourage patient to monitor pain and request assistance  - Assess pain using appropriate pain scale  - Administer analgesics based on type and severity of pain and evaluate response  - Implement non-pharmacological measures as appropriate and evaluate response  - Consider cultural and social influences on pain and pain management  - Notify physician/advanced practitioner if interventions unsuccessful or patient reports new pain  Outcome: Progressing     Problem: INFECTION - ADULT  Goal: Absence or prevention of progression during hospitalization  Description: INTERVENTIONS:  - Assess and monitor for signs and symptoms of infection  - Monitor lab/diagnostic results  - Monitor all insertion sites, i.e. indwelling lines, tubes, and drains  - Monitor endotracheal if appropriate and nasal secretions for changes in amount and color  - Hubbard appropriate cooling/warming therapies per order  -  Administer medications as ordered  - Instruct and encourage patient and family to use good hand hygiene technique  - Identify and instruct in appropriate isolation precautions for identified infection/condition  Outcome: Progressing  Goal: Absence of fever/infection during neutropenic period  Description: INTERVENTIONS:  - Monitor WBC    Outcome: Progressing     Problem: SAFETY ADULT  Goal: Patient will remain free of falls  Description: INTERVENTIONS:  - Educate patient/family on patient safety including physical limitations  - Instruct patient to call for assistance with activity   - Consult OT/PT to assist with strengthening/mobility   - Keep Call bell within reach  - Keep bed low and locked with side rails adjusted as appropriate  - Keep care items and personal belongings within reach  - Initiate and maintain comfort rounds  - Make Fall Risk Sign visible to staff  - Apply yellow socks and bracelet for high fall risk patients  - Consider moving patient to room near nurses station  Outcome: Progressing  Goal: Maintain or return to baseline ADL function  Description: INTERVENTIONS:  -  Assess patient's ability to carry out ADLs; assess patient's baseline for ADL function and identify physical deficits which impact ability to perform ADLs (bathing, care of mouth/teeth, toileting, grooming, dressing, etc.)  - Assess/evaluate cause of self-care deficits   - Assess range of motion  - Assess patient's mobility; develop plan if impaired  - Assess patient's need for assistive devices and provide as appropriate  - Encourage maximum independence but intervene and supervise when necessary  - Involve family in performance of ADLs  - Assess for home care needs following discharge   - Consider OT consult to assist with ADL evaluation and planning for discharge  - Provide patient education as appropriate  Outcome: Progressing  Goal: Maintains/Returns to pre admission functional level  Description: INTERVENTIONS:  - Perform  AM-PAC 6 Click Basic Mobility/ Daily Activity assessment daily.  - Set and communicate daily mobility goal to care team and patient/family/caregiver.   - Collaborate with rehabilitation services on mobility goals if consulted  - Out of bed for toileting  - Record patient progress and toleration of activity level   Outcome: Progressing     Problem: Knowledge Deficit  Goal: Patient/family/caregiver demonstrates understanding of disease process, treatment plan, medications, and discharge instructions  Description: Complete learning assessment and assess knowledge base.  Interventions:  - Provide teaching at level of understanding  - Provide teaching via preferred learning methods  Outcome: Progressing     Problem: DISCHARGE PLANNING  Goal: Discharge to home or other facility with appropriate resources  Description: INTERVENTIONS:  - Identify barriers to discharge w/patient and caregiver  - Arrange for needed discharge resources and transportation as appropriate  - Identify discharge learning needs (meds, wound care, etc.)  - Arrange for interpretive services to assist at discharge as needed  - Refer to Case Management Department for coordinating discharge planning if the patient needs post-hospital services based on physician/advanced practitioner order or complex needs related to functional status, cognitive ability, or social support system  Outcome: Progressing

## 2023-12-18 NOTE — OB LABOR/OXYTOCIN SAFETY PROGRESS
Labor Progress Note - Toshia Port 38 y.o. female MRN: [id number]    Unit/Bed#: -01 Encounter: [id number]       Contraction Frequency (minutes): 2-3  Contraction Intensity: Mild  Uterine Activity Characteristics: Regular  Cervical Dilation: Closed        Cervical Effacement: 50  Fetal Station: -2  Baseline Rate (FHR): 130 bpm  Fetal Heart Rate (FHT): 133 BPM  FHR Category: 1               Vital Signs:   Vitals:    12/18/23 1636   BP: 125/64   Pulse: 78   Resp: 18   Temp: 97.9 °F (36.6 °C)       Notes/comments:   Cannot give another misoprostol at this time due to contraction frequency. Will continue to monitor contraction pattern and administer when able. Would decline another attempt at mary placement at this time.    Martinez Prasad MD 12/18/2023 5:24 PM

## 2023-12-18 NOTE — OB LABOR/OXYTOCIN SAFETY PROGRESS
Labor Progress Note - Toshia Port 38 y.o. female MRN: [id number]    Unit/Bed#: -01 Encounter: [id number]       Contraction Frequency (minutes): 3-5  Contraction Intensity: Mild  Uterine Activity Characteristics: Regular    Cervical Dilation: Closed   Cervical Effacement: 50  Fetal Station: -3    Baseline Rate (FHR): 125 bpm  Fetal Heart Rate (FHT): 145 BPM  FHR Category: 1     Vital Signs:   Vitals:    12/18/23 0255   BP: 110/63   Pulse: 75   Resp: 18   Temp: 98.6 °F (37 °C)       Notes/comments:   Will place misoprostol 25mcg per vagina now;  fetal status reassuring      Charu Wilkerson MD 12/18/2023 7:54 AM

## 2023-12-18 NOTE — H&P
"H&P Exam - Obstetrics   Toshia Morel 38 y.o. female MRN: [id number]  Unit/Bed#: -01 Encounter: [id number]    Assessment/Plan     Assessment:  39 yo  at 39W5D for elective IOL at term, AMA    Plan:   39W5D gestation - fetal monitoring category 1 and reassuring  AMA  GBS positive - will use Ancef once in labor (amox allergy)  Admission - CBC, type and screen, RPR, start IV  Pain management - thinking of epidural  6.  Induction of labor plan - misoprostol 25mcg per vagina placed;  will attempt intracervical mary balloon placement once cervix starts to open    Charu Wilkerson MD  OB/GYN Hospitalist  [phone]  2023   10:30 PM    D/W Dr. Cisneros - covering for [facility]  _____________________________________________________________________  History of Present Illness   Chief Complaint: \"I am here to be induced\"    HPI:  Toshia Morel is a 38 y.o.  female with an DIO of 2023, by Ultrasound at 39w5d weeks gestation who is being admitted for elective induction of labor, AMA.  Her current obstetrical history is significant for GBS colonizer, advanced maternal age.  Contractions: None.  Leakage of fluid: None.  Bleeding: None.  Fetal movement: present.    Pregnancy complications:  AMA .    Review of Systems   Constitutional:  Negative for activity change, chills, fatigue and fever.   HENT:  Negative for facial swelling, mouth sores, postnasal drip, rhinorrhea, sinus pain and sore throat.    Eyes:  Negative for photophobia and visual disturbance.   Respiratory:  Negative for cough, shortness of breath and wheezing.    Cardiovascular:  Negative for chest pain, palpitations and leg swelling.   Gastrointestinal:  Negative for abdominal pain, constipation, diarrhea, nausea and vomiting.   Endocrine: Negative for heat intolerance.   Genitourinary:  Negative for dyspareunia, flank pain, frequency, pelvic pain and vaginal bleeding.   Musculoskeletal:  Negative for back pain and myalgias. "   Skin:  Negative for rash and wound.   Allergic/Immunologic: Negative for immunocompromised state.   Neurological:  Negative for seizures and syncope.   Hematological:  Does not bruise/bleed easily.   Psychiatric/Behavioral:  Negative for hallucinations. The patient is not nervous/anxious and is not hyperactive.        Historical Information   OB History    Para Term  AB Living   1 0 0 0 0 0   SAB IAB Ectopic Multiple Live Births   0 0 0 0 0      # Outcome Date GA Lbr Noel/2nd Weight Sex Delivery Anes PTL Lv   1 Current              Baby complications/comments:   Past Medical History:   Diagnosis Date    Asthma     Endometriosis     GERD (gastroesophageal reflux disease)     Herpes     IBS (irritable bowel syndrome)      Past Surgical History:   Procedure Laterality Date    APPENDECTOMY      CYSTECTOMY Right 2013    right ovary     Social History   Social History     Substance and Sexual Activity   Alcohol Use Not Currently     Social History     Substance and Sexual Activity   Drug Use Never     Social History     Tobacco Use   Smoking Status Never    Passive exposure: Never   Smokeless Tobacco Never     E-Cigarette/Vaping     E-Cigarette/Vaping Substances     Family History:   Family History   Problem Relation Age of Onset    Breast cancer Mother     Heart disease Father        Meds/Allergies   PTA meds:   Prior to Admission Medications   Prescriptions Last Dose Informant Patient Reported? Taking?   Prenatal Vit-Fe Fumarate-FA (PRENATAL VITAMINS PO) 2023 Self Yes Yes   Sig: Take by mouth   calcium carbonate (TUMS) 500 mg chewable tablet 2023 at 0900 Self Yes Yes   Sig: Chew 2 tablets if needed for indigestion or heartburn   famotidine (PEPCID) 10 mg tablet 2023 at 1400 Self Yes Yes   Sig: Take 10 mg by mouth 2 (two) times a day   valACYclovir (VALTREX) 500 mg tablet 2023 at 0900  No Yes   Sig: Take 1 tablet (500 mg total) by mouth 2 (two) times a day      Facility-Administered  "Medications: None     Allergies   Allergen Reactions    Amoxicillin Anaphylaxis, Hives, Itching, GI Intolerance, Palpitations, Rash and Shortness Of Breath    Dicyclomine Anxiety       Objective   Vitals: Blood pressure 121/70, pulse 101, temperature 98.6 °F (37 °C), temperature source Oral, resp. rate 18, height 5' 1\" (1.549 m), weight 89.8 kg (198 lb), last menstrual period 04/04/2023, unknown if currently breastfeeding.Body mass index is 37.41 kg/m².    Invasive Devices       None                   Physical Exam  Vitals and nursing note reviewed. Exam conducted with a chaperone present.   Constitutional:       General: She is not in acute distress.     Appearance: Normal appearance. She is obese. She is not ill-appearing, toxic-appearing or diaphoretic.   HENT:      Head: Normocephalic and atraumatic.      Nose: Nose normal.      Mouth/Throat:      Mouth: Mucous membranes are moist.      Pharynx: Oropharynx is clear.   Eyes:      General: No scleral icterus.  Cardiovascular:      Rate and Rhythm: Normal rate.      Pulses: Normal pulses.   Pulmonary:      Effort: Pulmonary effort is normal. No respiratory distress.      Breath sounds: No wheezing.   Abdominal:      General: Bowel sounds are normal. There is no distension.      Palpations: Abdomen is soft. There is mass (gravid fullterm uterus).      Tenderness: There is no abdominal tenderness. There is no guarding or rebound.   Genitourinary:     General: Normal vulva.   Musculoskeletal:      Cervical back: Neck supple.      Right lower leg: No edema.      Left lower leg: No edema.   Skin:     Capillary Refill: Capillary refill takes less than 2 seconds.      Findings: No bruising or lesion.   Neurological:      General: No focal deficit present.      Mental Status: She is alert and oriented to person, place, and time. Mental status is at baseline.   Psychiatric:         Mood and Affect: Mood normal.         Behavior: Behavior normal.         Thought Content: " "Thought content normal.         Judgment: Judgment normal.     FHTs - baseline 120, accelerations 140, moderate variability, no decels, category 1  [place] - occasional contraction  Cervix - closed/long/-2     Prenatal Labs: I have personally reviewed pertinent reports.  , Blood Type:   Lab Results   Component Value Date/Time    ABO Grouping O 06/07/2023 07:40 AM     , D (Rh type):   Lab Results   Component Value Date/Time    Rh Type RH(D) POSITIVE 06/07/2023 07:40 AM     , Antibody Screen: No results found for: \"ANTIBODYSCR\" , HCT/HGB:   Lab Results   Component Value Date/Time    HCT 34.6 09/28/2023 08:37 AM    Hemoglobin 12.0 09/28/2023 08:37 AM    External Hemoglobin 12.0 09/28/2023 12:00 AM      , MCV:   Lab Results   Component Value Date/Time    MCV 89 09/28/2023 08:37 AM      , Platelets:   Lab Results   Component Value Date/Time    Platelet Count 206 09/28/2023 08:37 AM    External Platelets 206 09/28/2023 12:00 AM      , 1 hour Glucola:   Lab Results   Component Value Date/Time    Glucose 82 09/28/2023 08:37 AM   , 3 hour GTT: No results found for: \"HOKUIMO8NR\", Varicella: No results found for: \"VARICELLAIGG\"    , Rubella: No results found for: \"RUBELLAIGGQT\"     , VDRL/RPR:   Lab Results   Component Value Date/Time    RPR Non Reactive 09/28/2023 08:37 AM      , Urine Culture/Screen: No results found for: \"URINECX\"    , Urine Drug Screen: No results found for: \"AMPHETUR\", \"BARBTUR\", \"BDZUR\", \"THCUR\", \"COCAINEUR\", \"METHADONEUR\", \"OPIATEUR\", \"PCPUR\", \"MTHAMUR\", \"ECSTASYUR\", \"TRICYCLICSUR\", Hep B:   Lab Results   Component Value Date/Time    Hepatitis B Surface Ag negative 06/07/2023 12:00 AM     , Hep C: No components found for: \"HEPCSAG\", \"EXTHEPCSAG\"   , HIV:   Lab Results   Component Value Date/Time    HIV-1/HIV-2 AB Non-Reactive 06/07/2023 12:00 AM    HIV AG/AB, 4th Gen NON-REACTIVE 06/07/2023 07:40 AM     , Chlamydia:   Lab Results   Component Value Date/Time    External Chlamydia Screen negative 05/23/2023 " "12:00 AM     , Gonorrhea: No results found for: \"LABNGO\"  , Group B Strep:       ,     Imaging, EKG, Pathology, and Other Studies: I have personally reviewed pertinent reports.             "

## 2023-12-18 NOTE — OB LABOR/OXYTOCIN SAFETY PROGRESS
Labor Progress Note - Toshia Port 38 y.o. female MRN: [id number]    Unit/Bed#: -01 Encounter: [id number]       Contraction Frequency (minutes): 2-4  Contraction Intensity: Mild  Uterine Activity Characteristics: Irregular  Cervical Dilation: Closed        Cervical Effacement: 50  Fetal Station: -2  Baseline Rate (FHR): 135 bpm  Fetal Heart Rate (FHT): 133 BPM  FHR Category: 1               Vital Signs:   Vitals:    12/18/23 1203   BP: 110/66   Pulse: 77   Resp: 18   Temp: 98.1 °F (36.7 °C)       Notes/comments:   Attempted to place cervical ripening balloon but unable to pass through os. She is reporting contractions which are making her uncomfortable. She is currently joavny too frequently for an additional misoprostol at this time. Will monitor contraction pattern and give another misoprostol dose when eligible.      Martinez Prasad MD 12/18/2023 12:48 PM

## 2023-12-18 NOTE — OB LABOR/OXYTOCIN SAFETY PROGRESS
Labor Progress Note - Toshia Port 38 y.o. female MRN: [id number]    Unit/Bed#: -01 Encounter: [id number]          Contraction Intensity: Mild  Uterine Activity Characteristics: Occasional    Cervical Dilation: Closed   Cervical Effacement: 0  Fetal Station: -3    Baseline Rate (FHR): 120 bpm  Fetal Heart Rate (FHT): 120 BPM  FHR Category: 1        Vital Signs:   Vitals:    12/17/23 2037   BP: 121/70   Pulse: 101   Resp: 18   Temp: 98.6 °F (37 °C)       Notes/comments:   Feeling occasional mild contraction (not as frequent as seen on monitor);  fetal heart rate tracing reassuring;  misoprostol 25mcg placed per vagina      Charu Wilkerson MD 12/18/2023 2:56 AM

## 2023-12-19 ENCOUNTER — ANESTHESIA EVENT (INPATIENT)
Dept: LABOR AND DELIVERY | Facility: HOSPITAL | Age: 38
End: 2023-12-19
Payer: COMMERCIAL

## 2023-12-19 ENCOUNTER — ANESTHESIA (INPATIENT)
Dept: LABOR AND DELIVERY | Facility: HOSPITAL | Age: 38
End: 2023-12-19
Payer: COMMERCIAL

## 2023-12-19 PROBLEM — Z98.891 STATUS POST PRIMARY LOW TRANSVERSE CESAREAN SECTION: Status: ACTIVE | Noted: 2023-12-19

## 2023-12-19 LAB
BASE EXCESS BLDCOV CALC-SCNC: -0.5 MMOL/L (ref 1–9)
HCO3 BLDCOV-SCNC: 25.3 MMOL/L (ref 12.2–28.6)
OXYHGB MFR BLDCOV: 30.8 %
PCO2 BLDCOV: 45.4 MM HG (ref 27–43)
PH BLDCOV: 7.36 [PH] (ref 7.19–7.49)
PO2 BLDCOV: 15.3 MM HG (ref 15–45)
SAO2 % BLDCOV: 6.8 ML/DL

## 2023-12-19 PROCEDURE — NC001 PR NO CHARGE: Performed by: STUDENT IN AN ORGANIZED HEALTH CARE EDUCATION/TRAINING PROGRAM

## 2023-12-19 PROCEDURE — 82805 BLOOD GASES W/O2 SATURATION: CPT | Performed by: STUDENT IN AN ORGANIZED HEALTH CARE EDUCATION/TRAINING PROGRAM

## 2023-12-19 PROCEDURE — 59510 CESAREAN DELIVERY: CPT | Performed by: STUDENT IN AN ORGANIZED HEALTH CARE EDUCATION/TRAINING PROGRAM

## 2023-12-19 RX ORDER — DOCUSATE SODIUM 100 MG/1
100 CAPSULE, LIQUID FILLED ORAL 2 TIMES DAILY PRN
Status: DISCONTINUED | OUTPATIENT
Start: 2023-12-19 | End: 2023-12-22 | Stop reason: HOSPADM

## 2023-12-19 RX ORDER — OXYCODONE HYDROCHLORIDE 5 MG/1
5 TABLET ORAL EVERY 4 HOURS PRN
Status: DISCONTINUED | OUTPATIENT
Start: 2023-12-20 | End: 2023-12-22 | Stop reason: HOSPADM

## 2023-12-19 RX ORDER — IBUPROFEN 600 MG/1
600 TABLET ORAL EVERY 6 HOURS
Status: DISCONTINUED | OUTPATIENT
Start: 2023-12-21 | End: 2023-12-22 | Stop reason: HOSPADM

## 2023-12-19 RX ORDER — HYDROMORPHONE HCL/PF 1 MG/ML
0.5 SYRINGE (ML) INJECTION
Status: DISCONTINUED | OUTPATIENT
Start: 2023-12-19 | End: 2023-12-22 | Stop reason: HOSPADM

## 2023-12-19 RX ORDER — NALBUPHINE HYDROCHLORIDE 10 MG/ML
10 INJECTION, SOLUTION INTRAMUSCULAR; INTRAVENOUS; SUBCUTANEOUS
Status: DISCONTINUED | OUTPATIENT
Start: 2023-12-19 | End: 2023-12-19

## 2023-12-19 RX ORDER — LIDOCAINE HCL/PF 100 MG/5ML
SYRINGE (ML) INJECTION AS NEEDED
Status: DISCONTINUED | OUTPATIENT
Start: 2023-12-19 | End: 2023-12-19

## 2023-12-19 RX ORDER — HYDROMORPHONE HCL/PF 1 MG/ML
0.5 SYRINGE (ML) INJECTION EVERY 2 HOUR PRN
Status: DISCONTINUED | OUTPATIENT
Start: 2023-12-19 | End: 2023-12-22 | Stop reason: HOSPADM

## 2023-12-19 RX ORDER — CEFAZOLIN SODIUM 2 G/50ML
2000 SOLUTION INTRAVENOUS ONCE
Status: DISCONTINUED | OUTPATIENT
Start: 2023-12-19 | End: 2023-12-19

## 2023-12-19 RX ORDER — SIMETHICONE 80 MG
80 TABLET,CHEWABLE ORAL 4 TIMES DAILY PRN
Status: DISCONTINUED | OUTPATIENT
Start: 2023-12-19 | End: 2023-12-22 | Stop reason: HOSPADM

## 2023-12-19 RX ORDER — LIDOCAINE HCL/EPINEPHRINE/PF 2%-1:200K
VIAL (ML) INJECTION AS NEEDED
Status: DISCONTINUED | OUTPATIENT
Start: 2023-12-19 | End: 2023-12-19

## 2023-12-19 RX ORDER — OXYTOCIN/RINGER'S LACTATE 30/500 ML
1-30 PLASTIC BAG, INJECTION (ML) INTRAVENOUS
Status: DISCONTINUED | OUTPATIENT
Start: 2023-12-19 | End: 2023-12-19

## 2023-12-19 RX ORDER — OXYCODONE HYDROCHLORIDE 5 MG/1
10 TABLET ORAL EVERY 4 HOURS PRN
Status: DISCONTINUED | OUTPATIENT
Start: 2023-12-19 | End: 2023-12-22 | Stop reason: HOSPADM

## 2023-12-19 RX ORDER — OXYTOCIN/RINGER'S LACTATE 30/500 ML
PLASTIC BAG, INJECTION (ML) INTRAVENOUS CONTINUOUS PRN
Status: DISCONTINUED | OUTPATIENT
Start: 2023-12-19 | End: 2023-12-19

## 2023-12-19 RX ORDER — DIPHENHYDRAMINE HYDROCHLORIDE 50 MG/ML
25 INJECTION INTRAMUSCULAR; INTRAVENOUS EVERY 6 HOURS PRN
Status: ACTIVE | OUTPATIENT
Start: 2023-12-19 | End: 2023-12-20

## 2023-12-19 RX ORDER — CEFAZOLIN SODIUM 1 G/50ML
1000 SOLUTION INTRAVENOUS EVERY 8 HOURS
Status: DISCONTINUED | OUTPATIENT
Start: 2023-12-19 | End: 2023-12-19

## 2023-12-19 RX ORDER — ACETAMINOPHEN 325 MG/1
650 TABLET ORAL EVERY 4 HOURS PRN
Status: DISCONTINUED | OUTPATIENT
Start: 2023-12-19 | End: 2023-12-22 | Stop reason: HOSPADM

## 2023-12-19 RX ORDER — CEFAZOLIN SODIUM 2 G/50ML
SOLUTION INTRAVENOUS AS NEEDED
Status: DISCONTINUED | OUTPATIENT
Start: 2023-12-19 | End: 2023-12-19

## 2023-12-19 RX ORDER — METOCLOPRAMIDE HYDROCHLORIDE 5 MG/ML
5 INJECTION INTRAMUSCULAR; INTRAVENOUS EVERY 6 HOURS PRN
Status: ACTIVE | OUTPATIENT
Start: 2023-12-19 | End: 2023-12-20

## 2023-12-19 RX ORDER — ENOXAPARIN SODIUM 100 MG/ML
40 INJECTION SUBCUTANEOUS DAILY
Status: DISCONTINUED | OUTPATIENT
Start: 2023-12-20 | End: 2023-12-20

## 2023-12-19 RX ORDER — LIDOCAINE HYDROCHLORIDE AND EPINEPHRINE 15; 5 MG/ML; UG/ML
INJECTION, SOLUTION EPIDURAL AS NEEDED
Status: DISCONTINUED | OUTPATIENT
Start: 2023-12-19 | End: 2023-12-19

## 2023-12-19 RX ORDER — NALOXONE HYDROCHLORIDE 0.4 MG/ML
0.1 INJECTION, SOLUTION INTRAMUSCULAR; INTRAVENOUS; SUBCUTANEOUS
Status: ACTIVE | OUTPATIENT
Start: 2023-12-19 | End: 2023-12-20

## 2023-12-19 RX ORDER — SODIUM CHLORIDE, SODIUM LACTATE, POTASSIUM CHLORIDE, CALCIUM CHLORIDE 600; 310; 30; 20 MG/100ML; MG/100ML; MG/100ML; MG/100ML
INJECTION, SOLUTION INTRAVENOUS CONTINUOUS PRN
Status: DISCONTINUED | OUTPATIENT
Start: 2023-12-19 | End: 2023-12-19

## 2023-12-19 RX ORDER — FENTANYL CITRATE/PF 50 MCG/ML
25 SYRINGE (ML) INJECTION
Status: DISCONTINUED | OUTPATIENT
Start: 2023-12-19 | End: 2023-12-22 | Stop reason: HOSPADM

## 2023-12-19 RX ORDER — KETOROLAC TROMETHAMINE 30 MG/ML
30 INJECTION, SOLUTION INTRAMUSCULAR; INTRAVENOUS EVERY 6 HOURS SCHEDULED
Status: COMPLETED | OUTPATIENT
Start: 2023-12-20 | End: 2023-12-20

## 2023-12-19 RX ORDER — ONDANSETRON 2 MG/ML
4 INJECTION INTRAMUSCULAR; INTRAVENOUS ONCE AS NEEDED
Status: DISCONTINUED | OUTPATIENT
Start: 2023-12-19 | End: 2023-12-22 | Stop reason: ALTCHOICE

## 2023-12-19 RX ORDER — ONDANSETRON 2 MG/ML
4 INJECTION INTRAMUSCULAR; INTRAVENOUS EVERY 8 HOURS PRN
Status: DISCONTINUED | OUTPATIENT
Start: 2023-12-19 | End: 2023-12-22 | Stop reason: HOSPADM

## 2023-12-19 RX ORDER — ACETAMINOPHEN 325 MG/1
650 TABLET ORAL EVERY 6 HOURS SCHEDULED
Status: DISCONTINUED | OUTPATIENT
Start: 2023-12-20 | End: 2023-12-19

## 2023-12-19 RX ORDER — DIPHENHYDRAMINE HYDROCHLORIDE 50 MG/ML
25 INJECTION INTRAMUSCULAR; INTRAVENOUS EVERY 6 HOURS PRN
Status: DISCONTINUED | OUTPATIENT
Start: 2023-12-19 | End: 2023-12-21

## 2023-12-19 RX ORDER — OXYTOCIN/RINGER'S LACTATE 30/500 ML
62.5 PLASTIC BAG, INJECTION (ML) INTRAVENOUS ONCE
Status: COMPLETED | OUTPATIENT
Start: 2023-12-20 | End: 2023-12-20

## 2023-12-19 RX ORDER — CALCIUM CARBONATE 500 MG/1
1000 TABLET, CHEWABLE ORAL 3 TIMES DAILY PRN
Status: DISCONTINUED | OUTPATIENT
Start: 2023-12-19 | End: 2023-12-22 | Stop reason: HOSPADM

## 2023-12-19 RX ORDER — KETOROLAC TROMETHAMINE 30 MG/ML
INJECTION, SOLUTION INTRAMUSCULAR; INTRAVENOUS AS NEEDED
Status: DISCONTINUED | OUTPATIENT
Start: 2023-12-19 | End: 2023-12-19

## 2023-12-19 RX ORDER — CEFAZOLIN SODIUM 2 G/50ML
2000 SOLUTION INTRAVENOUS ONCE
Status: COMPLETED | OUTPATIENT
Start: 2023-12-19 | End: 2023-12-19

## 2023-12-19 RX ORDER — NALBUPHINE HYDROCHLORIDE 10 MG/ML
10 INJECTION, SOLUTION INTRAMUSCULAR; INTRAVENOUS; SUBCUTANEOUS
Status: DISCONTINUED | OUTPATIENT
Start: 2023-12-19 | End: 2023-12-22 | Stop reason: HOSPADM

## 2023-12-19 RX ORDER — MORPHINE SULFATE 0.5 MG/ML
INJECTION, SOLUTION EPIDURAL; INTRATHECAL; INTRAVENOUS AS NEEDED
Status: DISCONTINUED | OUTPATIENT
Start: 2023-12-19 | End: 2023-12-19

## 2023-12-19 RX ORDER — ONDANSETRON 2 MG/ML
INJECTION INTRAMUSCULAR; INTRAVENOUS AS NEEDED
Status: DISCONTINUED | OUTPATIENT
Start: 2023-12-19 | End: 2023-12-19

## 2023-12-19 RX ADMIN — AZITHROMYCIN MONOHYDRATE 500 MG: 500 INJECTION, POWDER, LYOPHILIZED, FOR SOLUTION INTRAVENOUS at 22:25

## 2023-12-19 RX ADMIN — LIDOCAINE HYDROCHLORIDE,EPINEPHRINE BITARTRATE 5 ML: 20; .005 INJECTION, SOLUTION EPIDURAL; INFILTRATION; INTRACAUDAL; PERINEURAL at 22:24

## 2023-12-19 RX ADMIN — CEFAZOLIN SODIUM 2000 MG: 2 SOLUTION INTRAVENOUS at 22:24

## 2023-12-19 RX ADMIN — Medication 2 MILLI-UNITS/MIN: at 18:15

## 2023-12-19 RX ADMIN — LIDOCAINE HYDROCHLORIDE,EPINEPHRINE BITARTRATE 3 ML: 15; .005 INJECTION, SOLUTION EPIDURAL; INFILTRATION; INTRACAUDAL; PERINEURAL at 18:02

## 2023-12-19 RX ADMIN — KETOROLAC TROMETHAMINE 30 MG: 30 INJECTION, SOLUTION INTRAMUSCULAR; INTRAVENOUS at 23:07

## 2023-12-19 RX ADMIN — Medication 25 MCG: at 02:34

## 2023-12-19 RX ADMIN — LIDOCAINE HYDROCHLORIDE,EPINEPHRINE BITARTRATE 3 ML: 15; .005 INJECTION, SOLUTION EPIDURAL; INFILTRATION; INTRACAUDAL; PERINEURAL at 17:48

## 2023-12-19 RX ADMIN — LIDOCAINE HYDROCHLORIDE,EPINEPHRINE BITARTRATE 5 ML: 20; .005 INJECTION, SOLUTION EPIDURAL; INFILTRATION; INTRACAUDAL; PERINEURAL at 22:28

## 2023-12-19 RX ADMIN — VALACYCLOVIR HYDROCHLORIDE 500 MG: 500 TABLET, FILM COATED ORAL at 08:47

## 2023-12-19 RX ADMIN — SODIUM CHLORIDE, SODIUM LACTATE, POTASSIUM CHLORIDE, AND CALCIUM CHLORIDE 999 ML/HR: .6; .31; .03; .02 INJECTION, SOLUTION INTRAVENOUS at 17:11

## 2023-12-19 RX ADMIN — NALBUPHINE HYDROCHLORIDE 10 MG: 10 INJECTION, SOLUTION INTRAMUSCULAR; INTRAVENOUS; SUBCUTANEOUS at 13:15

## 2023-12-19 RX ADMIN — SODIUM CHLORIDE, SODIUM LACTATE, POTASSIUM CHLORIDE, AND CALCIUM CHLORIDE 125 ML/HR: .6; .31; .03; .02 INJECTION, SOLUTION INTRAVENOUS at 09:19

## 2023-12-19 RX ADMIN — Medication 250 MILLI-UNITS/MIN: at 22:44

## 2023-12-19 RX ADMIN — CEFAZOLIN SODIUM 2000 MG: 2 SOLUTION INTRAVENOUS at 13:57

## 2023-12-19 RX ADMIN — LIDOCAINE HYDROCHLORIDE 2 ML: 20 INJECTION INTRAVENOUS at 17:52

## 2023-12-19 RX ADMIN — SODIUM CHLORIDE, SODIUM LACTATE, POTASSIUM CHLORIDE, AND CALCIUM CHLORIDE: .6; .31; .03; .02 INJECTION, SOLUTION INTRAVENOUS at 22:19

## 2023-12-19 RX ADMIN — MORPHINE SULFATE 2 MG: 0.5 INJECTION EPIDURAL; INTRATHECAL; INTRAVENOUS at 23:05

## 2023-12-19 RX ADMIN — VALACYCLOVIR HYDROCHLORIDE 500 MG: 500 TABLET, FILM COATED ORAL at 22:03

## 2023-12-19 RX ADMIN — ONDANSETRON 4 MG: 2 INJECTION INTRAMUSCULAR; INTRAVENOUS at 22:45

## 2023-12-19 RX ADMIN — LIDOCAINE HYDROCHLORIDE,EPINEPHRINE BITARTRATE 5 ML: 20; .005 INJECTION, SOLUTION EPIDURAL; INFILTRATION; INTRACAUDAL; PERINEURAL at 22:26

## 2023-12-19 RX ADMIN — ROPIVACAINE HYDROCHLORIDE: 2 INJECTION, SOLUTION EPIDURAL; INFILTRATION at 18:22

## 2023-12-19 RX ADMIN — Medication 25 MCG: at 12:41

## 2023-12-19 RX ADMIN — Medication 25 MCG: at 07:45

## 2023-12-19 RX ADMIN — SODIUM CHLORIDE, SODIUM LACTATE, POTASSIUM CHLORIDE, AND CALCIUM CHLORIDE 125 ML/HR: .6; .31; .03; .02 INJECTION, SOLUTION INTRAVENOUS at 18:09

## 2023-12-19 RX ADMIN — LIDOCAINE HYDROCHLORIDE,EPINEPHRINE BITARTRATE 2 ML: 15; .005 INJECTION, SOLUTION EPIDURAL; INFILTRATION; INTRACAUDAL; PERINEURAL at 17:50

## 2023-12-19 NOTE — OB LABOR/OXYTOCIN SAFETY PROGRESS
Labor Progress Note - Toshia Port 38 y.o. female MRN: [id number]    Unit/Bed#: -01 Encounter: [id number]       Contraction Frequency (minutes): 3-5  Contraction Intensity: Mild  Uterine Activity Characteristics: Irregular  Cervical Dilation: 2-3        Cervical Effacement: 50  Fetal Station: -3  Baseline Rate (FHR): 135 bpm  Fetal Heart Rate (FHT): 142 BPM  FHR Category: I           Vital Signs:   Vitals:    12/19/23 0849   BP: 103/59   Pulse: 81   Resp: 18   Temp: 98.1 °F (36.7 °C)   SpO2: 100%       Notes/comments:   - Fetal and maternal status reassuring. Cervix beginning to dilate around balloon. Misoprostol 25 mcg administered per vagina. Analgesia PRN.       Yamil Schmid MD 12/19/2023 12:41 PM

## 2023-12-19 NOTE — PLAN OF CARE
Problem: BIRTH - VAGINAL/ SECTION  Goal: Fetal and maternal status remain reassuring during the birth process  Description: INTERVENTIONS:  - Monitor vital signs  - Monitor fetal heart rate  - Monitor uterine activity  - Monitor labor progression (vaginal delivery)  - DVT prophylaxis  - Antibiotic prophylaxis  Outcome: Progressing  Goal: Emotionally satisfying birthing experience for mother/fetus  Description: Interventions:  - Assess, plan, implement and evaluate the nursing care given to the patient in labor  - Advocate the philosophy that each childbirth experience is a unique experience and support the family's chosen level of involvement and control during the labor process   - Actively participate in both the patient's and family's teaching of the birth process  - Consider cultural, Moravian and age-specific factors and plan care for the patient in labor  Outcome: Progressing     Problem: PAIN - ADULT  Goal: Verbalizes/displays adequate comfort level or baseline comfort level  Description: Interventions:  - Encourage patient to monitor pain and request assistance  - Assess pain using appropriate pain scale  - Administer analgesics based on type and severity of pain and evaluate response  - Implement non-pharmacological measures as appropriate and evaluate response  - Consider cultural and social influences on pain and pain management  - Notify physician/advanced practitioner if interventions unsuccessful or patient reports new pain  Outcome: Progressing     Problem: INFECTION - ADULT  Goal: Absence or prevention of progression during hospitalization  Description: INTERVENTIONS:  - Assess and monitor for signs and symptoms of infection  - Monitor lab/diagnostic results  - Monitor all insertion sites, i.e. indwelling lines, tubes, and drains  - Monitor endotracheal if appropriate and nasal secretions for changes in amount and color  - [place] appropriate cooling/warming therapies per order  -  Administer medications as ordered  - Instruct and encourage patient and family to use good hand hygiene technique  - Identify and instruct in appropriate isolation precautions for identified infection/condition  Outcome: Progressing  Goal: Absence of fever/infection during neutropenic period  Description: INTERVENTIONS:  - Monitor WBC    Outcome: Progressing     Problem: SAFETY ADULT  Goal: Patient will remain free of falls  Description: INTERVENTIONS:  - Educate patient/family on patient safety including physical limitations  - Instruct patient to call for assistance with activity   - Consult OT/PT to assist with strengthening/mobility   - Keep Call bell within reach  - Keep bed low and locked with side rails adjusted as appropriate  - Keep care items and personal belongings within reach  - Initiate and maintain comfort rounds  - Make Fall Risk Sign visible to staff    - Apply yellow socks and bracelet for high fall risk patients  - Consider moving patient to room near nurses station  Outcome: Progressing  Goal: Maintain or return to baseline ADL function  Description: INTERVENTIONS:  -  Assess patient's ability to carry out ADLs; assess patient's baseline for ADL function and identify physical deficits which impact ability to perform ADLs (bathing, care of mouth/teeth, toileting, grooming, dressing, etc.)  - Assess/evaluate cause of self-care deficits   - Assess range of motion  - Assess patient's mobility; develop plan if impaired  - Assess patient's need for assistive devices and provide as appropriate  - Encourage maximum independence but intervene and supervise when necessary  - Involve family in performance of ADLs  - Assess for home care needs following discharge   - Consider OT consult to assist with ADL evaluation and planning for discharge  - Provide patient education as appropriate  Outcome: Progressing  Goal: Maintains/Returns to pre admission functional level  Description: INTERVENTIONS:  - Perform  AM-PAC 6 Click Basic Mobility/ Daily Activity assessment daily.  - Set and communicate daily mobility goal to care team and patient/family/caregiver.   - Collaborate with rehabilitation services on mobility goals if consulted    - Out of bed for toileting  - Record patient progress and toleration of activity level   Outcome: Progressing     Problem: Knowledge Deficit  Goal: Patient/family/caregiver demonstrates understanding of disease process, treatment plan, medications, and discharge instructions  Description: Complete learning assessment and assess knowledge base.  Interventions:  - Provide teaching at level of understanding  - Provide teaching via preferred learning methods  Outcome: Progressing     Problem: DISCHARGE PLANNING  Goal: Discharge to home or other facility with appropriate resources  Description: INTERVENTIONS:  - Identify barriers to discharge w/patient and caregiver  - Arrange for needed discharge resources and transportation as appropriate  - Identify discharge learning needs (meds, wound care, etc.)  - Arrange for interpretive services to assist at discharge as needed  - Refer to Case Management Department for coordinating discharge planning if the patient needs post-hospital services based on physician/advanced practitioner order or complex needs related to functional status, cognitive ability, or social support system  Outcome: Progressing

## 2023-12-19 NOTE — OB LABOR/OXYTOCIN SAFETY PROGRESS
Labor Progress Note - Toshia Port 38 y.o. female MRN: [id number]    Unit/Bed#: -01 Encounter: [id number]       Contraction Frequency (minutes): 2-5  Contraction Intensity: Mild/Moderate  Uterine Activity Characteristics: Irregular  Cervical Dilation: 5        Cervical Effacement: 50  Fetal Station: -2  Baseline Rate (FHR): 135 bpm  Fetal Heart Rate (FHT): 142 BPM  FHR Category: II           Vital Signs:   Vitals:    12/19/23 1618   BP:    Pulse:    Resp:    Temp: 98.4 °F (36.9 °C)   SpO2:        Notes/comments:   - Spontaneous rupture of membranes occurred at 13:30. Patient is progressing in early phase of labor. FHR is category II due to occasional, non-repetitive late decelerations. Overall reassuring based on moderate variability and present accelerations. Given > 4 hours since administration of misoprostol, will now initiate pitocin. Analgesia PRN.    Yamil Schmid MD 12/19/2023 4:53 PM

## 2023-12-19 NOTE — ANESTHESIA PREPROCEDURE EVALUATION
Procedure:  LABOR ANALGESIA    Relevant Problems   CARDIO   (+) Breast pain during pregnancy      GI/HEPATIC   (+) Gastroesophageal reflux during pregnancy in third trimester, antepartum      GYN   (+) 39 weeks gestation of pregnancy   BMI-37   No back issues  Asthma -Stable  Physical Exam    Airway    Mallampati score: II  TM Distance: >3 FB  Neck ROM: full     Dental   No notable dental hx     Cardiovascular      Pulmonary      Other Findings  post-pubertal.      Anesthesia Plan  ASA Score- 2     Anesthesia Type- epidural with ASA Monitors.         Additional Monitors:     Airway Plan:            Plan Factors-Exercise tolerance (METS): >4 METS.    Chart reviewed.   Existing labs reviewed.     Patient is not a current smoker.              Induction-     Postoperative Plan-     Informed Consent-             Lab Results   Component Value Date    ALT 17 07/10/2023    AST 20 07/10/2023    HCT 38.9 12/17/2023    HGB 13.3 12/17/2023     12/17/2023    WBC 10.46 (H) 12/17/2023     Epidural discussed with patient. Side effects, including post dural puncture headache discussed. All questions answered. Consent given by patient.

## 2023-12-19 NOTE — PROGRESS NOTES
12/19/23 1655   Oxytocin Safety Bundle   Oxytocin Initiation Type Induction   Estimated Fetal Weight 8.75 lbs   Baseline Rate (FHR) 135 bpm   FHR Category Category II   Contraction Frequency (minutes) 2-5   Contraction Duration (seconds)    Contraction Intensity Mild/Moderate   Tachysystole No   Veloz Score   Cervical Consistency 2   Cervical Dilation 5   Cervical Effacement 50   Cervical Position 1   Fetal Station -2   Veloz Score 8   Oxytocin Safety Bundle Completion   Oxytocin Safety Bundle complete? Yes   Safe to proceed? Yes     Yamil Schmid MD  12/19/2023 4:55 PM

## 2023-12-19 NOTE — OB LABOR/OXYTOCIN SAFETY PROGRESS
Oxytocin Safety Progress Check Note - Toshia Morel 38 y.o. female MRN: [id number]    Unit/Bed#: -01 Encounter: [id number]       Contraction Frequency (minutes): 1-5  Contraction Intensity: Mild  Uterine Activity Characteristics: Irregular  Cervical Dilation: Closed        Cervical Effacement: 50  Fetal Station: -2  Baseline Rate (FHR): 140 bpm  Fetal Heart Rate (FHT): 133 BPM  FHR Category: 1               Vital Signs:   Vitals:    12/18/23 1915   BP: 119/70   Pulse: 83   Resp: 18   Temp: 98.6 °F (37 °C)   SpO2: 99%       Notes/comments:     No change in cervix still closed  Another dose cytotec able to be given now as finally meets criteria  Will check 3 hours, attempt balloon if dilated, pt does not desire another attempt unless dilated      Ava Pitt DO 12/18/2023 7:21 PM

## 2023-12-19 NOTE — OB LABOR/OXYTOCIN SAFETY PROGRESS
Oxytocin Safety Progress Check Note - Toshia Morel 38 y.o. female MRN: [id number]    Unit/Bed#: -01 Encounter: [id number]       Contraction Frequency (minutes): 6  Contraction Intensity: Mild  Uterine Activity Characteristics: Irregular  Cervical Dilation: Closed        Cervical Effacement: 50  Fetal Station: -2  Baseline Rate (FHR): 125 bpm  Fetal Heart Rate (FHT): 142 BPM  FHR Category: 1               Vital Signs:   Vitals:    12/19/23 0035   BP: 115/72   Pulse: 76   Resp:    Temp:    SpO2:        Notes/comments:   Irreg ctx pt very comfortable  No change  Cytotec #6        Ava Pitt DO 12/19/2023 2:07 AM

## 2023-12-19 NOTE — OB LABOR/OXYTOCIN SAFETY PROGRESS
Oxytocin Safety Progress Check Note - Toshia Morel 38 y.o. female MRN: [id number]    Unit/Bed#: -01 Encounter: [id number]       Contraction Frequency (minutes): 6.5-7  Contraction Intensity: Mild  Uterine Activity Characteristics: Irregular  Cervical Dilation: Closed        Cervical Effacement: 50  Fetal Station: -2  Baseline Rate (FHR): 125 bpm  Fetal Heart Rate (FHT): 136 BPM  FHR Category: 1               Vital Signs:   Vitals:    12/18/23 1915   BP: 119/70   Pulse: 83   Resp: 18   Temp: 98.6 °F (37 °C)   SpO2: 99%       Notes/comments:       Still closed, but more anterior and softer  Pt declining attempt at mary until dilated  Cytotec placed-  #5    Ava Pitt DO 12/18/2023 11:44 PM

## 2023-12-19 NOTE — ANESTHESIA PROCEDURE NOTES
Epidural Block    Patient location during procedure: OB/L&D  Start time: 12/19/2023 5:17 PM  Reason for block: procedure for pain and at surgeon's request  Staffing  Performed by: Yg Arnold MD  Authorized by: Yg Arnold MD    Preanesthetic Checklist  Completed: patient identified, IV checked, risks and benefits discussed, surgical consent, monitors and equipment checked, pre-op evaluation and timeout performed  Epidural  Patient position: sitting  Prep: Betadine and site prepped and draped  Sedation Level: no sedation  Patient monitoring: heart rate, frequent blood pressure checks and continuous pulse oximetry  Approach: midline  Location: lumbar, L4-5  Injection technique: REINIER air  Needle  Needle type: Tuohy   Needle gauge: 17 G  Needle insertion depth: 7 cm  Catheter type: side hole  Catheter size: 19 G  Catheter at skin depth: 12 cm  Catheter securement method: clear occlusive dressing, stabilization device and tape  Test dose: negative  Assessment  Number of attempts: 3 or morenegative aspiration for CSF, negative aspiration for heme and no paresthesia on injection  patient tolerated the procedure well with no immediate complications  Additional Notes  Epidural with difficulty locating interspace. After no asp. CSF or heme 3mls. N/S injected via epidural needle w/o incident. Epidural catheter inserted with paresthesia to Right. Resolved promptly.Catheter taped. Supine/ Back elevated.Left lateral tilt.

## 2023-12-19 NOTE — OB LABOR/OXYTOCIN SAFETY PROGRESS
Labor Progress Note - Toshia Port 38 y.o. female MRN: [id number]    Unit/Bed#: -01 Encounter: [id number]       Contraction Frequency (minutes): 3-5  Contraction Intensity: Mild  Uterine Activity Characteristics: Irregular  Cervical Dilation: Closed        Cervical Effacement: 50  Fetal Station: -3  Baseline Rate (FHR): 135 bpm  Fetal Heart Rate (FHT): 142 BPM  FHR Category: I           Vital Signs:   Vitals:    12/19/23 0035   BP: 115/72   Pulse: 76   Resp:    Temp:    SpO2:        Notes/comments:   - Fetal and maternal status reassuring. Graf balloon placed on exam and inflated with 60 mL sterile saline. Misoprostol 25 mcg administered per vagina.       Yamil Schmid MD 12/19/2023 7:43 AM

## 2023-12-19 NOTE — QUICK NOTE
Pt declines exam and beginning of pitocin or placement mary balloon as of now.    She wishes to rediscuss options with Dr. Schmid.  Dr. Prasad made aware.    Continues as category 1 tracing, ctx q 2-5  min

## 2023-12-20 LAB
BASOPHILS # BLD AUTO: 0.03 THOUSANDS/ÂΜL (ref 0–0.1)
BASOPHILS NFR BLD AUTO: 0 % (ref 0–1)
EOSINOPHIL # BLD AUTO: 0.01 THOUSAND/ÂΜL (ref 0–0.61)
EOSINOPHIL NFR BLD AUTO: 0 % (ref 0–6)
ERYTHROCYTE [DISTWIDTH] IN BLOOD BY AUTOMATED COUNT: 13.9 % (ref 11.6–15.1)
HCT VFR BLD AUTO: 35.7 % (ref 34.8–46.1)
HGB BLD-MCNC: 12.2 G/DL (ref 11.5–15.4)
IMM GRANULOCYTES # BLD AUTO: 0.15 THOUSAND/UL (ref 0–0.2)
IMM GRANULOCYTES NFR BLD AUTO: 1 % (ref 0–2)
LYMPHOCYTES # BLD AUTO: 1.26 THOUSANDS/ÂΜL (ref 0.6–4.47)
LYMPHOCYTES NFR BLD AUTO: 8 % (ref 14–44)
MCH RBC QN AUTO: 30 PG (ref 26.8–34.3)
MCHC RBC AUTO-ENTMCNC: 34.2 G/DL (ref 31.4–37.4)
MCV RBC AUTO: 88 FL (ref 82–98)
MONOCYTES # BLD AUTO: 1.57 THOUSAND/ÂΜL (ref 0.17–1.22)
MONOCYTES NFR BLD AUTO: 10 % (ref 4–12)
NEUTROPHILS # BLD AUTO: 13.23 THOUSANDS/ÂΜL (ref 1.85–7.62)
NEUTS SEG NFR BLD AUTO: 81 % (ref 43–75)
NRBC BLD AUTO-RTO: 0 /100 WBCS
PLATELET # BLD AUTO: 176 THOUSANDS/UL (ref 149–390)
PMV BLD AUTO: 10.8 FL (ref 8.9–12.7)
RBC # BLD AUTO: 4.06 MILLION/UL (ref 3.81–5.12)
WBC # BLD AUTO: 16.25 THOUSAND/UL (ref 4.31–10.16)

## 2023-12-20 PROCEDURE — 99024 POSTOP FOLLOW-UP VISIT: CPT | Performed by: OBSTETRICS & GYNECOLOGY

## 2023-12-20 PROCEDURE — 85025 COMPLETE CBC W/AUTO DIFF WBC: CPT | Performed by: STUDENT IN AN ORGANIZED HEALTH CARE EDUCATION/TRAINING PROGRAM

## 2023-12-20 RX ADMIN — KETOROLAC TROMETHAMINE 30 MG: 30 INJECTION, SOLUTION INTRAMUSCULAR at 11:44

## 2023-12-20 RX ADMIN — HYDROMORPHONE HYDROCHLORIDE 0.5 MG: 1 INJECTION, SOLUTION INTRAMUSCULAR; INTRAVENOUS; SUBCUTANEOUS at 00:10

## 2023-12-20 RX ADMIN — KETOROLAC TROMETHAMINE 30 MG: 30 INJECTION, SOLUTION INTRAMUSCULAR at 05:31

## 2023-12-20 RX ADMIN — KETOROLAC TROMETHAMINE 30 MG: 30 INJECTION, SOLUTION INTRAMUSCULAR at 18:50

## 2023-12-20 RX ADMIN — ACETAMINOPHEN 650 MG: 325 TABLET, FILM COATED ORAL at 10:29

## 2023-12-20 RX ADMIN — Medication 62.5 MILLI-UNITS/MIN: at 00:00

## 2023-12-20 RX ADMIN — ACETAMINOPHEN 650 MG: 325 TABLET, FILM COATED ORAL at 01:43

## 2023-12-20 RX ADMIN — DOCUSATE SODIUM 100 MG: 100 CAPSULE, LIQUID FILLED ORAL at 11:44

## 2023-12-20 RX ADMIN — SIMETHICONE 80 MG: 80 TABLET, CHEWABLE ORAL at 20:19

## 2023-12-20 NOTE — PLAN OF CARE

## 2023-12-20 NOTE — OP NOTE
OPERATIVE REPORT  PATIENT NAME: Toshia Morel    :  1985  MRN: [id number]  Pt Location: [facility] L&D OR ROOM 02    SURGERY DATE: 2023    Surgeons and Role:     * Yamil Schmid MD - Primary     * Esme Nagel MD - Assisting    Pre-op Diagnosis: Parekh pregnancy at 40w0d in vertex presentation    Post-op Diagnosis: Same, delivered    Procedure: Primary low-transverse  section via Pfannenstiel skin incision    Specimen(s):  ID Type Source Tests Collected by Time Destination   A :  Cord Blood Cord BLOOD GAS, ARTERIAL, CORD (Canceled) Yamil Schmid MD 2023    B :  Cord Blood Cord BLOOD GAS, VENOUS, CORD Yamil Schmid MD 2023    C :  Tissue (Placenta on Hold) OB Only Placenta PLACENTA IN STORAGE Yamil Schmid MD 2023 2332        Quantitative blood loss: 410 mL    Drains:  Urethral Catheter Non-latex;Double-lumen (Active)   Number of days: 0     Anesthesia Type: Epidural    Operative Indications:   Toshia Morel is a 38 y.o.  at 40w0d for primary  section for persistent category II fetal heart rate tracing remote from delivery. In the setting of recurrent late decelerations and protracted active phase of labor at 6 cm cervical dilation, delivery was recommended via primary  section. All risks, benefits, and alternatives were discussed with the patient. All questions were answered. The patient agreed to proceed with surgery.    Roderick Group Classification System:  No Multiple pregnancy, No Transverse or oblique lie, No Breech lie, Gestational age is > or =37 weeks, Nulliparous, Labor induced +  is RODERICK GROUP 2a    Operative Findings:  1. Live infant delivered from left occiput posterior position through clear fluid at 22:43, weight 4230 grams, Apgar scores of  8 and 9 at 1 and 5 minutes, respectively.  Nuchal cord x 1 noted.  2. Intact placenta delivered via fundal massage, 3-vessel cord noted.  3. Normal appearing bilateral fallopian  tubes and ovaries.    Complications: None    Procedure and Technique:    The patient was taken to the operating room, where a bolus of epidural anesthesia was administered. The patient was then placed in supine position with a leftward tilt. She was prepped and draped in the normal sterile fashion. When anesthesia was found to be adequate, a Pfannenstiel skin incision was made with a scalpel and carried down to the underlying layer of fascia. The fascia was then incised with a scalpel and extended laterally and superiorly with curved Shane scissors. The superior portion of the fascial incision was then grasped with two Kocher clamps, elevated, and  from the underlying rectus muscles with sharp and blunt dissection. Attention was then turned to the inferior aspect of the fascial incision, which in a similar manner, was grasped with two Kocher clamps, elevated, and  from the underlying rectus muscles using sharp and blunt dissection. The rectus muscles were then  in the midline, and the peritoneum was entered bluntly. The peritoneal incision was then extended superiorly, inferiorly, and laterally using blunt dissection and electrocautery with excellent visualization of the bladder. When adequate exposure had been achieved, the bladder blade was then placed.      A low transverse uterine incision was then made with a scalpel. The uterus was entered bluntly and the hysterotomy was extended bluntly in a cephalad-caudad manner. The infant was then delivered in vertex presentation from left occiput posterior position. After delayed cord clamping for 30 seconds, the cord was clamped and cut, and the infant was handed off to awaiting Neonatologist. Cord blood was collected and the placenta was delivered with fundal massage noted to be intact with 3-vessel cord.     The uterus was then exteriorized and cleared of all clots and debris. The hysterotomy was then closed with 0-Vicryl suture in a running  locked fashion. A second layer of the same suture was used in a imbricating fashion in order to obtain excellent hemostasis. The uterus was then returned to the abdomen, and the gutters were cleared of all clots and debris. The hysterotomy was inspected and noted to be hemostatic. The fascia was then closed with 0 Vicryl suture in a running fashion. The deep subcutaneous tissue was then irrigated and closed with 3-0 Vicryl suture in a running fashion. The skin was then closed with 4-0 Vicryl suture in subcuticular fashion.     The patient tolerated the procedure well. Sponge, lap, and needle counts were correct x 3. The patient was taken to the Recovery Room in stable condition.    I was present for the entire procedure. A qualified resident physician was not available. A co-surgeon was required because of skills and techniques relevant to speciality.    Patient Disposition:  PACU     SIGNATURE: Yamil Schmid MD  DATE: December 19, 2023  TIME: 11:43 PM

## 2023-12-20 NOTE — ANESTHESIA POSTPROCEDURE EVALUATION
Post-Op Assessment Note    CV Status:  Stable    Pain management: adequate      Post-op block assessment: catheter intact   Mental Status:  Alert and awake   Hydration Status:  Euvolemic   PONV Controlled:  Controlled   Airway Patency:  Patent     Post Op Vitals Reviewed: Yes      Staff: CRNA               /54   Temp 98.7 °F (37.1 °C) (12/19/23 2335)    Pulse 99 (12/19/23 2335)   Resp 20 (12/19/23 2335)    SpO2 100 % (12/19/23 2335)

## 2023-12-20 NOTE — OB LABOR/OXYTOCIN SAFETY PROGRESS
Oxytocin Safety Progress Check Note - Toshia Port 38 y.o. female MRN: [id number]    Unit/Bed#: -01 Encounter: [id number]    Dose (thomas-units/min) Oxytocin: 0 thomas-units/min  Contraction Frequency (minutes): 2-4  Contraction Intensity: Moderate  Uterine Activity Characteristics: Regular  Cervical Dilation: 6        Cervical Effacement: 80  Fetal Station: -1  Baseline Rate (FHR): 135 bpm  Fetal Heart Rate (FHT): 142 BPM  FHR Category: II           Vital Signs:   Vitals:    23   BP: 103/58   Pulse: 88   Resp:    Temp:    SpO2:        Notes/comments:   SAFETY HUDDLE:  TRIGGER: Category 2 FHR tracing    PARTICIPANTS: Marina Lopez RN; Yamil Schmid MD    REVIEW OF CURRENT PLAN OF CARE AND MANAGEMENT: The FHR tracing shows moderate variability. Recurrent decelerations have been noted for the last hour despite resuscitative measures. The patient is in the active phase of labor. During this time, labor progress has been protracted. For this reason, I recommend  section for persistent category II tracing.     Risks of surgery were reviewed with the patient and her partner including bleeding, infection, damage to surrounding organs/structures (specifically bowel, bladder, vessels, nerves, ureters), scar tissue formation, hernia, risk of placenta accreta spectrum in future pregnancy, and need for further surgery. The patient and her partner were given the opportunity to ask questions. All questions were answered to their apparent satisfaction. Patient agrees to proceed with  delivery as recommended.     ALL PARTICIPANTS IN AGREEMENT WITH PLAN OF CARE: Yes    IS PERRT REQUIRED: No     Yamil Schmid MD 2023 10:00 PM

## 2023-12-20 NOTE — LACTATION NOTE
This note was copied from a baby's chart.  CONSULT - LACTATION  Baby Boy (Toshia) Adriel 1 days male MRN: [id number]    [facility] NURSERY Room / Bed: (N)/(N) Encounter: [id number]    Maternal Information     MOTHER:  Toshia Morel  Maternal Age: 38 y.o.   OB History: # 1 - Date: 23, Sex: Male, Weight: 4230 g (9 lb 5.2 oz), GA: 40w0d, Delivery: , Low Transverse, Apgar1: 8, Apgar5: 9, Living: Living, Birth Comments: jarek present in OR for delivery   Previouse breast reduction surgery? No    Lactation history:   Has patient previously breast fed: No   How long had patient previously breast fed:     Previous breast feeding complications:       Past Surgical History:   Procedure Laterality Date    APPENDECTOMY      CYSTECTOMY Right 2013    right ovary        Birth information:  YOB: 2025   Time of birth: 10:43 PM   Sex: male   Delivery type: , Low Transverse   Birth Weight: 4230 g (9 lb 5.2 oz)   Percent of Weight Change: 0%     Gestational Age: 40w0d   [unfilled]    Assessment     Breast and nipple assessment:  normal assessment with triangular shape, symmetrical and nipples aubrey, copious colostrum     Assessment:  tight jaw, exercises completed to help lossen jaw, cups well and sustains latch on finger during suck training.    Feeding assessment: feeding well  LATCH:  Latch: Grasps breast, tongue down, lips flanged, rhythmic sucking   Audible Swallowing: Spontaneous and intermittent (24 hours old)   Type of Nipple: Everted (After stimulation)   Comfort (Breast/Nipple): Soft/non-tender   Hold (Positioning): Partial assist, teach one side, mother does other, staff holds   LATCH Score: 9          Feeding recommendations:  breast feed on demand    Met with parents to discuss feeding plan. Mother discussed that she desires to breastfeed.    The Ready, Set, Baby Booklet was discussed. Discussed importance of skin to skin to  "help baby awaken for breastfeeding, to help with milk production as well as stabilize temperature, blood sugars, decrease pain, promote relaxation, and calm the baby as well as for bonding that father may do as well. Discussed tummy size progression as milk production increases to meet the nutritional/growing needs of the baby. Discussed alternative feeding methods as a manner to provide baby with additional colostrum/breast milk if baby is sleepy and/or unable to breastfeed directly to help protect the milk supply and preserve latching abilities at the breast. Mother was encouraged to hand express after feedings to provide \"dessert\" and when sleepy to hand express to provide \"snacks\" which could help baby to awaken for a feeding.    Discussed “Second Night Syndrome” explaining how baby’s cluster feeds to meet growing needs. Growth spurts periods were discussed within the first year and how cluster feeding helps boost milk supply.    Explained feeding cues and fullness cues as well as importance of obtaining a deep latch for effective milk removal and proper positioning (tummy to tummy, at level, nose to nipple, bring chin to breast first and bringing baby to breast) with ear, shoulder, and hip alignment. Demonstrated how to hold, compress and perform hand expression. Mother practiced and was able to express 7 drops that were given to baby prior to latching in football on the left side was burped and given 0.1 ml of expressed colostrum. Mother was using the manual hand pump and was sized at 20-21 mm. Given flexi-shields to use when using manual breast pump.    Addressed breast pump needs and mother discussed that she has a double breast pump for home use.    Parents were made aware of how to communicate with lactation and encouraged to reach out for a latch assessment, continued support and/or questions that arise.    Primary RN present and active in assisting mother with feeding and hand expression.    Liliana " BRENDA Curiel 12/20/2023 10:41 AM

## 2023-12-20 NOTE — OB LABOR/OXYTOCIN SAFETY PROGRESS
Oxytocin Safety Progress Check Note - Toshia Morel 38 y.o. female MRN: [id number]    Unit/Bed#: -01 Encounter: [id number]    Dose (thomas-units/min) Oxytocin: 6 thomas-units/min  Contraction Frequency (minutes): 4-6  Contraction Intensity: Moderate  Uterine Activity Characteristics: Regular  Cervical Dilation: 6        Cervical Effacement: 80  Fetal Station: -1  Baseline Rate (FHR): 130 bpm  Fetal Heart Rate (FHT): 142 BPM  FHR Category: II           Vital Signs:   Vitals:    12/19/23 1858   BP: 114/59   Pulse: 100   Resp:    Temp:    SpO2:        Notes/comments:   - Now in active phase of labor. Caput is noted. Patient is comfortable following placement of epidural    SAFETY HUDDLE:  TRIGGER: Category 2 FHR tracing    PARTICIPANTS: Radha Lopez RN; Yamil Schmid MD    REVIEW OF CURRENT PLAN OF CARE AND MANAGEMENT: The FHR tracing shows moderate variability and accelerations. Occasional late decelerations have been noted for the last hour despite resuscitative measures. The patient is in the active phase of labor. During this time, labor progress has been normal. For this reason, I recommend observation with continued assessment to confirm ongoing labor progress. Will plan to reassess in 1-2 hours.     ALL PARTICIPANTS IN AGREEMENT WITH PLAN OF CARE: Yes    IS PERRT REQUIRED: No     Yamil Schmid MD 12/19/2023 8:16 PM

## 2023-12-20 NOTE — PLAN OF CARE
Problem: PAIN - ADULT  Goal: Verbalizes/displays adequate comfort level or baseline comfort level  Description: Interventions:  - Encourage patient to monitor pain and request assistance  - Assess pain using appropriate pain scale  - Administer analgesics based on type and severity of pain and evaluate response  - Implement non-pharmacological measures as appropriate and evaluate response  - Consider cultural and social influences on pain and pain management  - Notify physician/advanced practitioner if interventions unsuccessful or patient reports new pain  Outcome: Progressing     Problem: INFECTION - ADULT  Goal: Absence or prevention of progression during hospitalization  Description: INTERVENTIONS:  - Assess and monitor for signs and symptoms of infection  - Monitor lab/diagnostic results  - Monitor all insertion sites, i.e. indwelling lines, tubes, and drains  - Monitor endotracheal if appropriate and nasal secretions for changes in amount and color  - [place] appropriate cooling/warming therapies per order  - Administer medications as ordered  - Instruct and encourage patient and family to use good hand hygiene technique  - Identify and instruct in appropriate isolation precautions for identified infection/condition  Outcome: Progressing  Goal: Absence of fever/infection during neutropenic period  Description: INTERVENTIONS:  - Monitor WBC    Outcome: Progressing     Problem: SAFETY ADULT  Goal: Patient will remain free of falls  Description: INTERVENTIONS:  - Educate patient/family on patient safety including physical limitations  - Instruct patient to call for assistance with activity   - Consult OT/PT to assist with strengthening/mobility   - Keep Call bell within reach  - Keep bed low and locked with side rails adjusted as appropriate  - Keep care items and personal belongings within reach  - Initiate and maintain comfort rounds  - Make Fall Risk Sign visible to staff  - Apply yellow socks and bracelet  for high fall risk patients  - Consider moving patient to room near nurses station  Outcome: Progressing  Goal: Maintain or return to baseline ADL function  Description: INTERVENTIONS:  -  Assess patient's ability to carry out ADLs; assess patient's baseline for ADL function and identify physical deficits which impact ability to perform ADLs (bathing, care of mouth/teeth, toileting, grooming, dressing, etc.)  - Assess/evaluate cause of self-care deficits   - Assess range of motion  - Assess patient's mobility; develop plan if impaired  - Assess patient's need for assistive devices and provide as appropriate  - Encourage maximum independence but intervene and supervise when necessary  - Involve family in performance of ADLs  - Assess for home care needs following discharge   - Consider OT consult to assist with ADL evaluation and planning for discharge  - Provide patient education as appropriate  Outcome: Progressing  Goal: Maintains/Returns to pre admission functional level  Description: INTERVENTIONS:  - Perform AM-PAC 6 Click Basic Mobility/ Daily Activity assessment daily.  - Set and communicate daily mobility goal to care team and patient/family/caregiver.   - Collaborate with rehabilitation services on mobility goals if consulted  - Out of bed for toileting  - Record patient progress and toleration of activity level   Outcome: Progressing     Problem: Knowledge Deficit  Goal: Patient/family/caregiver demonstrates understanding of disease process, treatment plan, medications, and discharge instructions  Description: Complete learning assessment and assess knowledge base.  Interventions:  - Provide teaching at level of understanding  - Provide teaching via preferred learning methods  Outcome: Progressing     Problem: DISCHARGE PLANNING  Goal: Discharge to home or other facility with appropriate resources  Description: INTERVENTIONS:  - Identify barriers to discharge w/patient and caregiver  - Arrange for needed  discharge resources and transportation as appropriate  - Identify discharge learning needs (meds, wound care, etc.)  - Arrange for interpretive services to assist at discharge as needed  - Refer to Case Management Department for coordinating discharge planning if the patient needs post-hospital services based on physician/advanced practitioner order or complex needs related to functional status, cognitive ability, or social support system  Outcome: Progressing     Problem: POSTPARTUM  Goal: Experiences normal postpartum course  Description: INTERVENTIONS:  - Monitor maternal vital signs  - Assess uterine involution and lochia  Outcome: Progressing  Goal: Appropriate maternal -  bonding  Description: INTERVENTIONS:  - Identify family support  - Assess for appropriate maternal/infant bonding   -Encourage maternal/infant bonding opportunities  - Referral to  or  as needed  Outcome: Progressing  Goal: Establishment of infant feeding pattern  Description: INTERVENTIONS:  - Assess breast/bottle feeding  - Refer to lactation as needed  Outcome: Progressing  Goal: Incision(s), wounds(s) or drain site(s) healing without S/S of infection  Description: INTERVENTIONS  - Assess and document dressing, incision, wound bed, drain sites and surrounding tissue  - Provide patient and family education  - Perform skin care/dressing changes every  Outcome: Progressing

## 2023-12-20 NOTE — PROGRESS NOTES
Progress Note - OB/GYN  Post-Partum Physician Note   Toshia Morel 38 y.o. female MRN: [id number]  Unit/Bed#: -01 Encounter: [id number]    Patient is postop and postpartum day 1 from a  ([facility]) with epidural anesthesia.    Subjective:   Pain: yes - controlled with medication  Tolerating Oral Intake: yes  Voiding: yes  Flatus: yes  Bowel Movement: no  Ambulating: yes  Breastfeeding: Breastfeeding  Chest Pain: no  Shortness of Breath: no  Leg Pain/Discomfort: no  Lochia: minimal    Objective:   Vitals:    23 0101 23 0130 23 0310 23 0803   BP: 100/57 109/61 109/61 94/53   BP Location: Right arm  Right arm Left arm   Pulse: 82 83 72 67   Resp: 18   17   Temp: 98.7 °F (37.1 °C)  98.4 °F (36.9 °C) 98.1 °F (36.7 °C)   TempSrc: Oral  Oral Temporal   SpO2: 97% 98% 97% 97%   Weight:       Height:           Intake/Output Summary (Last 24 hours) at 2023 1137  Last data filed at 2023 0800  Gross per 24 hour   Intake 1000 ml   Output 1420 ml   Net -420 ml       Physical Exam:  General: in no apparent distress, well developed and well nourished, non-toxic, in no respiratory distress and acyanotic, alert, oriented times 3, afebrile, and normal vitals  Abdomen: abdomen is soft without significant tenderness, masses, organomegaly or guarding  Fundus: Firm and non-tender, 1 below the umbilicus  Incision: dressings present  Lower extremeties: nontender    Labs/Tests:   Recent Results (from the past 24 hour(s))   CORD, Blood gas, venous    Collection Time: 23 10:43 PM   Result Value Ref Range    pH, Cord Jin 7.364 7.190 - 7.490    pCO2, Cord Jin 45.4 (H) 27.0 - 43.0 mm HG    pO2, Cord Jin 15.3 15.0 - 45.0 mm HG    HCO3, Cord Jin 25.3 12.2 - 28.6 mmol/L    Base Exc, Cord Jin -0.5 (L) 1.0 - 9.0 mmol/L    O2 Cont, Cord Jin 6.8 mL/dL    O2 HGB,VENOUS CORD 30.8 %   CBC and differential    Collection Time: 23  8:03 AM   Result Value Ref Range    WBC 16.25 (H) 4.31 - 10.16  "Thousand/uL    RBC 4.06 3.81 - 5.12 Million/uL    Hemoglobin 12.2 11.5 - 15.4 g/dL    Hematocrit 35.7 34.8 - 46.1 %    MCV 88 82 - 98 fL    MCH 30.0 26.8 - 34.3 pg    MCHC 34.2 31.4 - 37.4 g/dL    RDW 13.9 11.6 - 15.1 %    MPV 10.8 8.9 - 12.7 fL    Platelets 176 149 - 390 Thousands/uL    nRBC 0 /100 WBCs    Neutrophils Relative 81 (H) 43 - 75 %    Immat GRANS % 1 0 - 2 %    Lymphocytes Relative 8 (L) 14 - 44 %    Monocytes Relative 10 4 - 12 %    Eosinophils Relative 0 0 - 6 %    Basophils Relative 0 0 - 1 %    Neutrophils Absolute 13.23 (H) 1.85 - 7.62 Thousands/µL    Immature Grans Absolute 0.15 0.00 - 0.20 Thousand/uL    Lymphocytes Absolute 1.26 0.60 - 4.47 Thousands/µL    Monocytes Absolute 1.57 (H) 0.17 - 1.22 Thousand/µL    Eosinophils Absolute 0.01 0.00 - 0.61 Thousand/µL    Basophils Absolute 0.03 0.00 - 0.10 Thousands/µL         Brief OB Lab review:  ABO Grouping   Date Value Ref Range Status   12/17/2023 O  Final      Rh Factor   Date Value Ref Range Status   12/17/2023 Positive  Final     Rh Type   Date Value Ref Range Status   06/07/2023 RH(D) POSITIVE  Final     Comment:        For additional information, please refer to   http://[email or web address].[email or web address]/faq/[email or web address]   (This link is being provided for informational/  educational purposes only.)      No results found for: \"ANTIBODYSCR\"  No results found for: \"RUBM\"    MEDS:   Current Facility-Administered Medications   Medication Dose Route Frequency    acetaminophen (TYLENOL) tablet 650 mg  650 mg Oral Q4H PRN    calcium carbonate (TUMS) chewable tablet 1,000 mg  1,000 mg Oral TID PRN    diphenhydrAMINE (BENADRYL) injection 25 mg  25 mg Intravenous Q6H PRN    diphenhydrAMINE (BENADRYL) injection 25 mg  25 mg Intravenous Q6H PRN    docusate sodium (COLACE) capsule 100 mg  100 mg Oral BID PRN    fentaNYL (SUBLIMAZE) injection 25 mcg  25 mcg Intravenous Q3 min PRN    HYDROmorphone (DILAUDID) injection 0.5 mg  0.5 mg Intravenous Q5 Min PRN    " HYDROmorphone (DILAUDID) injection 0.5 mg  0.5 mg Intravenous Q2H PRN    ketorolac (TORADOL) injection 30 mg  30 mg Intravenous Q6H ROYAL    Followed by    [START ON 2023] ibuprofen (MOTRIN) tablet 600 mg  600 mg Oral Q6H    metoclopramide (REGLAN) injection 5 mg  5 mg Intravenous Q6H PRN    nalbuphine (NUBAIN) injection 10 mg  10 mg Intravenous Q3H PRN    naloxone (NARCAN) injection 0.1 mg  0.1 mg Intravenous Q3 min PRN    ondansetron (ZOFRAN) injection 4 mg  4 mg Intravenous Once PRN    ondansetron (ZOFRAN) injection 4 mg  4 mg Intravenous Q8H PRN    oxyCODONE (ROXICODONE) IR tablet 10 mg  10 mg Oral Q4H PRN    oxyCODONE (ROXICODONE) IR tablet 5 mg  5 mg Oral Q4H PRN    simethicone (MYLICON) chewable tablet 80 mg  80 mg Oral 4x Daily PRN     Invasive Devices       Peripheral Intravenous Line  Duration             Peripheral IV 23 Dorsal (posterior);Right Forearm 2 days                    Assessment and Plan:  38 y.o. year-old , postpartum day 1 status-post  , Low Transverse     Continue routine postpartum care  Encourage ambulation  Advance diet as tolerated      No problem-specific Assessment & Plan notes found for this encounter.      Martinez Prasad MD

## 2023-12-21 PROCEDURE — 99024 POSTOP FOLLOW-UP VISIT: CPT | Performed by: OBSTETRICS & GYNECOLOGY

## 2023-12-21 RX ORDER — DIPHENHYDRAMINE HCL 25 MG
25 TABLET ORAL EVERY 6 HOURS PRN
Status: DISCONTINUED | OUTPATIENT
Start: 2023-12-21 | End: 2023-12-22 | Stop reason: HOSPADM

## 2023-12-21 RX ADMIN — ACETAMINOPHEN 650 MG: 325 TABLET, FILM COATED ORAL at 08:45

## 2023-12-21 RX ADMIN — ACETAMINOPHEN 650 MG: 325 TABLET, FILM COATED ORAL at 16:44

## 2023-12-21 RX ADMIN — ACETAMINOPHEN 650 MG: 325 TABLET, FILM COATED ORAL at 20:49

## 2023-12-21 RX ADMIN — DOCUSATE SODIUM 100 MG: 100 CAPSULE, LIQUID FILLED ORAL at 17:58

## 2023-12-21 RX ADMIN — IBUPROFEN 600 MG: 600 TABLET, FILM COATED ORAL at 17:58

## 2023-12-21 RX ADMIN — IBUPROFEN 600 MG: 600 TABLET, FILM COATED ORAL at 23:43

## 2023-12-21 RX ADMIN — IBUPROFEN 600 MG: 600 TABLET, FILM COATED ORAL at 06:23

## 2023-12-21 RX ADMIN — IBUPROFEN 600 MG: 600 TABLET, FILM COATED ORAL at 00:36

## 2023-12-21 RX ADMIN — IBUPROFEN 600 MG: 600 TABLET, FILM COATED ORAL at 12:14

## 2023-12-21 RX ADMIN — DOCUSATE SODIUM 100 MG: 100 CAPSULE, LIQUID FILLED ORAL at 08:45

## 2023-12-21 NOTE — PLAN OF CARE
Problem: PAIN - ADULT  Goal: Verbalizes/displays adequate comfort level or baseline comfort level  Description: Interventions:  - Encourage patient to monitor pain and request assistance  - Assess pain using appropriate pain scale  - Administer analgesics based on type and severity of pain and evaluate response  - Implement non-pharmacological measures as appropriate and evaluate response  - Consider cultural and social influences on pain and pain management  - Notify physician/advanced practitioner if interventions unsuccessful or patient reports new pain  Outcome: Progressing     Problem: INFECTION - ADULT  Goal: Absence or prevention of progression during hospitalization  Description: INTERVENTIONS:  - Assess and monitor for signs and symptoms of infection  - Monitor lab/diagnostic results  - Monitor all insertion sites, i.e. indwelling lines, tubes, and drains  - Monitor endotracheal if appropriate and nasal secretions for changes in amount and color  - [place] appropriate cooling/warming therapies per order  - Administer medications as ordered  - Instruct and encourage patient and family to use good hand hygiene technique  - Identify and instruct in appropriate isolation precautions for identified infection/condition  Outcome: Progressing  Goal: Absence of fever/infection during neutropenic period  Description: INTERVENTIONS:  - Monitor WBC    Outcome: Progressing     Problem: SAFETY ADULT  Goal: Patient will remain free of falls  Description: INTERVENTIONS:  - Educate patient/family on patient safety including physical limitations  - Instruct patient to call for assistance with activity   - Consult OT/PT to assist with strengthening/mobility   - Keep Call bell within reach  - Keep bed low and locked with side rails adjusted as appropriate  - Keep care items and personal belongings within reach  - Initiate and maintain comfort rounds  - Make Fall Risk Sign visible to staff  - Apply yellow socks and bracelet  for high fall risk patients  - Consider moving patient to room near nurses station  Outcome: Progressing  Goal: Maintain or return to baseline ADL function  Description: INTERVENTIONS:  -  Assess patient's ability to carry out ADLs; assess patient's baseline for ADL function and identify physical deficits which impact ability to perform ADLs (bathing, care of mouth/teeth, toileting, grooming, dressing, etc.)  - Assess/evaluate cause of self-care deficits   - Assess range of motion  - Assess patient's mobility; develop plan if impaired  - Assess patient's need for assistive devices and provide as appropriate  - Encourage maximum independence but intervene and supervise when necessary  - Involve family in performance of ADLs  - Assess for home care needs following discharge   - Consider OT consult to assist with ADL evaluation and planning for discharge  - Provide patient education as appropriate  Outcome: Progressing  Goal: Maintains/Returns to pre admission functional level  Description: INTERVENTIONS:  - Perform AM-PAC 6 Click Basic Mobility/ Daily Activity assessment daily.  - Set and communicate daily mobility goal to care team and patient/family/caregiver.   - Collaborate with rehabilitation services on mobility goals if consulted  - Out of bed for toileting  - Record patient progress and toleration of activity level   Outcome: Progressing     Problem: Knowledge Deficit  Goal: Patient/family/caregiver demonstrates understanding of disease process, treatment plan, medications, and discharge instructions  Description: Complete learning assessment and assess knowledge base.  Interventions:  - Provide teaching at level of understanding  - Provide teaching via preferred learning methods  Outcome: Progressing     Problem: DISCHARGE PLANNING  Goal: Discharge to home or other facility with appropriate resources  Description: INTERVENTIONS:  - Identify barriers to discharge w/patient and caregiver  - Arrange for needed  discharge resources and transportation as appropriate  - Identify discharge learning needs (meds, wound care, etc.)  - Arrange for interpretive services to assist at discharge as needed  - Refer to Case Management Department for coordinating discharge planning if the patient needs post-hospital services based on physician/advanced practitioner order or complex needs related to functional status, cognitive ability, or social support system  Outcome: Progressing     Problem: POSTPARTUM  Goal: Experiences normal postpartum course  Description: INTERVENTIONS:  - Monitor maternal vital signs  - Assess uterine involution and lochia  Outcome: Progressing  Goal: Appropriate maternal -  bonding  Description: INTERVENTIONS:  - Identify family support  - Assess for appropriate maternal/infant bonding   -Encourage maternal/infant bonding opportunities  - Referral to  or  as needed  Outcome: Progressing  Goal: Establishment of infant feeding pattern  Description: INTERVENTIONS:  - Assess breast/bottle feeding  - Refer to lactation as needed  Outcome: Progressing  Goal: Incision(s), wounds(s) or drain site(s) healing without S/S of infection  Description: INTERVENTIONS  - Assess and document dressing, incision, wound bed, drain sites and surrounding tissue  - Provide patient and family education  Outcome: Progressing

## 2023-12-21 NOTE — LACTATION NOTE
This note was copied from a baby's chart.  Met with parents to follow up from yesterday's encounter. Mother discussed that baby has been feeding well, doing best in a laid back position when she is in bed and football when she is sitting in the chair. Mother discussed feeling discomfort on initial latch and the it eases into a gentle tugging and pulling.    Baby is currently at a 2.7% weight loss, having appropriate output and 24 hour bilirubin was low.    Discussed with parents lactation support outpatient to address questions that they had.    Parents were encouraged to call for further support/questions that arise.

## 2023-12-21 NOTE — PROGRESS NOTES
"Progress Note - OB/GYN  Post-Partum Physician Note   Toshia Morel 38 y.o. female MRN: [id number]  Unit/Bed#:  205-01 Encounter: [id number]    Patient is postop and postpartum day 2 from a primary  for non-reassuring fetal status with epidural anesthesia.    Subjective:   Pain: controlled  Tolerating Oral Intake: yes  Voiding: yes  Flatus: yes  Bowel Movement: no  Ambulating: yes  Breastfeeding: Breastfeeding  Shortness of Breath: no  Leg Pain/Discomfort: no  Lochia: normal      Objective:   Vitals:    23 1910 23 2330 23 0145 23 0807   BP: 105/70 107/63 112/56 107/70   BP Location: Right arm Right arm Right arm Right arm   Pulse: 71 72 80 75   Resp: 18 18 18 17   Temp: 98.6 °F (37 °C) 98.6 °F (37 °C) 98.5 °F (36.9 °C) (!) 97 °F (36.1 °C)   TempSrc: Temporal Oral Axillary Temporal   SpO2: 98% 100% 98% 99%   Weight:       Height:           Intake/Output Summary (Last 24 hours) at 2023 0903  Last data filed at 2023 1844  Gross per 24 hour   Intake --   Output 1100 ml   Net -1100 ml       Physical Exam:  General: in no apparent distress  Abdomen: abdomen is soft without significant tenderness  Fundus: Firm and non-tender, 1 below the umbilicus  Incision: C/D/I  Lower extremities: nontender      Labs/Tests:   Lab Results   Component Value Date    WBC 16.25 (H) 2023    HGB 12.2 2023    HCT 35.7 2023    MCV 88 2023     2023       Brief OB Lab review:  ABO Grouping   Date Value Ref Range Status   2023 O  Final      Rh Factor   Date Value Ref Range Status   2023 Positive  Final     Rh Type   Date Value Ref Range Status   2023 RH(D) POSITIVE  Final     Comment:        For additional information, please refer to   http://UltiZen.Mila/faq/[email or web address]   (This link is being provided for informational/  educational purposes only.)      No results found for: \"ANTIBODYSCR\"  External [place] IGG Quantitation   Date Value " Ref Range Status   2023 immune  Final         MEDS:   Current Facility-Administered Medications   Medication Dose Route Frequency    acetaminophen (TYLENOL) tablet 650 mg  650 mg Oral Q4H PRN    calcium carbonate (TUMS) chewable tablet 1,000 mg  1,000 mg Oral TID PRN    diphenhydrAMINE (BENADRYL) injection 25 mg  25 mg Intravenous Q6H PRN    docusate sodium (COLACE) capsule 100 mg  100 mg Oral BID PRN    fentaNYL (SUBLIMAZE) injection 25 mcg  25 mcg Intravenous Q3 min PRN    HYDROmorphone (DILAUDID) injection 0.5 mg  0.5 mg Intravenous Q5 Min PRN    HYDROmorphone (DILAUDID) injection 0.5 mg  0.5 mg Intravenous Q2H PRN    ibuprofen (MOTRIN) tablet 600 mg  600 mg Oral Q6H    nalbuphine (NUBAIN) injection 10 mg  10 mg Intravenous Q3H PRN    ondansetron (ZOFRAN) injection 4 mg  4 mg Intravenous Once PRN    ondansetron (ZOFRAN) injection 4 mg  4 mg Intravenous Q8H PRN    oxyCODONE (ROXICODONE) IR tablet 10 mg  10 mg Oral Q4H PRN    oxyCODONE (ROXICODONE) IR tablet 5 mg  5 mg Oral Q4H PRN    simethicone (MYLICON) chewable tablet 80 mg  80 mg Oral 4x Daily PRN     Invasive Devices       Peripheral Intravenous Line  Duration             Peripheral IV 23 Dorsal (posterior);Right Forearm 3 days                    Assessment and Plan:  38 y.o. year-old , postpartum day 2 status-post  , Low Transverse .    Continue routine postpartum care  Encourage ambulation    Planning discharge tomorrow.        Aicha Link MD

## 2023-12-21 NOTE — UTILIZATION REVIEW
Initial Clinical Review    Admission: Date/Time/Statement:   Admission Orders (From admission, onward)       Ordered        23 2200  Inpatient Admission  Once                          Orders Placed This Encounter   Procedures    Inpatient Admission     Standing Status:   Standing     Number of Occurrences:   1     Order Specific Question:   Level of Care     Answer:   Med Surg [16]     Order Specific Question:   Estimated length of stay     Answer:   More than 2 Midnights     Order Specific Question:   Certification     Answer:   I certify that inpatient services are medically necessary for this patient for a duration of greater than two midnights. See H&P and MD Progress Notes for additional information about the patient's course of treatment.       Chief Complaint   Patient presents with    Scheduled Induction       Initial Presentation: 38 y.o.   female with an DIO of 2023, by Ultrasound at 39w5d weeks gestation who is being admitted inpatient for elective induction of labor, AMA.  Her current obstetrical history is significant for GBS colonizer, advanced maternal age.  Contractions: None.  Leakage of fluid: None.  Bleeding: None.  Fetal movement: present.   Pregnancy complications:  AMA .   Plan:   39W5D gestation - fetal monitoring category 1 and reassuring  AMA  GBS positive - will use Ancef once in labor (amox allergy)  Admission - CBC, type and screen, RPR, start IV  Pain management - thinking of epidural  6.  Induction of labor plan - misoprostol 25mcg per vagina placed;  will attempt intracervical mary balloon placement once cervix starts to open     2:56 AM  Contraction Intensity: Mild  Uterine Activity Characteristics: Occasional   Cervical Dilation: Closed   Cervical Effacement: 0  Fetal Station: -3   Baseline Rate (FHR): 120 bpm  Fetal Heart Rate (FHT): 120 BPM  FHR Category: 1  Feeling occasional mild contraction (not as frequent as seen on monitor);  fetal heart rate tracing  reassuring;  misoprostol 25mcg placed per vagina       12/18 7:54 AM  Contraction Frequency (minutes): 3-5  Contraction Intensity: Mild  Uterine Activity Characteristics: Regular   Cervical Dilation: Closed   Cervical Effacement: 50  Fetal Station: -3   Baseline Rate (FHR): 125 bpm  Fetal Heart Rate (FHT): 145 BPM  FHR Category: 1  Will place misoprostol 25mcg per vagina now;  fetal status reassuring       12/18 12:48 AM  Contraction Frequency (minutes): 2-4  Contraction Intensity: Mild  Uterine Activity Characteristics: Irregular  Cervical Dilation: Closed  Cervical Effacement: 50  Fetal Station: -2  Baseline Rate (FHR): 135 bpm  Fetal Heart Rate (FHT): 133 BPM  FHR Category: 1  Attempted to place cervical ripening balloon but unable to pass through os. She is reporting contractions which are making her uncomfortable. She is currently jovany too frequently for an additional misoprostol at this time. Will monitor contraction pattern and give another misoprostol dose when eligible.       12/18 7:21 AM  Contraction Frequency (minutes): 1-5  Contraction Intensity: Mild  Uterine Activity Characteristics: Irregular  Cervical Dilation: Closed  Cervical Effacement: 50  Fetal Station: -2  Baseline Rate (FHR): 140 bpm  Fetal Heart Rate (FHT): 133 BPM  FHR Category: 1  No change in cervix still closed  Another dose cytotec able to be given now as finally meets criteria  Will check 3 hours, attempt balloon if dilated, pt does not desire another attempt unless dilated       12/18 11:44 PM  Contraction Frequency (minutes): 6.5-7  Contraction Intensity: Mild  Uterine Activity Characteristics: Irregular  Cervical Dilation: Closed  Cervical Effacement: 50  Fetal Station: -2  Baseline Rate (FHR): 125 bpm  Fetal Heart Rate (FHT): 136 BPM  FHR Category: 1  Still closed, but more anterior and softer  Pt declining attempt at mary until dilated  Cytotec placed-  #5     12/19 2:07 AM  Contraction Frequency (minutes): 6  Contraction  Intensity: Mild  Uterine Activity Characteristics: Irregular  Cervical Dilation: Closed  Cervical Effacement: 50  Fetal Station: -2  Baseline Rate (FHR): 125 bpm  Fetal Heart Rate (FHT): 142 BPM  FHR Category: 1  Irreg ctx pt very comfortable  No change  Cytotec #6      12/19 7:43 AM  Contraction Frequency (minutes): 3-5  Contraction Intensity: Mild  Uterine Activity Characteristics: Irregular  Cervical Dilation: Closed  Cervical Effacement: 50  Fetal Station: -3  Baseline Rate (FHR): 135 bpm  Fetal Heart Rate (FHT): 142 BPM  FHR Category: I  Fetal and maternal status reassuring. Graf balloon placed on exam and inflated with 60 mL sterile saline. Misoprostol 25 mcg administered per vagina.        12/19 12:41 PM  Contraction Frequency (minutes): 3-5  Contraction Intensity: Mild  Uterine Activity Characteristics: Irregular  Cervical Dilation: 2-3  Cervical Effacement: 50  Fetal Station: -3  Baseline Rate (FHR): 135 bpm  Fetal Heart Rate (FHT): 142 BPM  FHR Category: I  Fetal and maternal status reassuring. Cervix beginning to dilate around balloon. Misoprostol 25 mcg administered per vagina. Analgesia PRN     12/19 4:52 PM  Contraction Frequency (minutes): 2-5  Contraction Intensity: Mild/Moderate  Uterine Activity Characteristics: Irregular  Cervical Dilation: 5  Cervical Effacement: 50  Fetal Station: -2  Baseline Rate (FHR): 135 bpm  Fetal Heart Rate (FHT): 142 BPM  FHR Category: II  - Spontaneous rupture of membranes occurred at 13:30. Patient is progressing in early phase of labor. FHR is category II due to occasional, non-repetitive late decelerations. Overall reassuring based on moderate variability and present accelerations. Given > 4 hours since administration of misoprostol, will now initiate pitocin. Analgesia PRN.     12/19 - epidural block given    12/19 8:23 PM  Dose (thomas-units/min) Oxytocin: 6 thomas-units/min  Contraction Frequency (minutes): 4-6  Contraction Intensity: Moderate  Uterine Activity  Characteristics: Regular  Cervical Dilation: 6  Cervical Effacement: 80  Fetal Station: -1  Baseline Rate (FHR): 130 bpm  Fetal Heart Rate (FHT): 142 BPM  FHR Category: II  Now in active phase of labor. Caput is noted. Patient is comfortable following placement of epidural      10:00 PM  Dose (thomas-units/min) Oxytocin: 0 thomas-units/min  Contraction Frequency (minutes): 2-4  Contraction Intensity: Moderate  Uterine Activity Characteristics: Regular  Cervical Dilation: 6  Cervical Effacement: 80  Fetal Station: -1  Baseline Rate (FHR): 135 bpm  Fetal Heart Rate (FHT): 142 BPM  FHR Category: II  The FHR tracing shows moderate variability. Recurrent decelerations have been noted for the last hour despite resuscitative measures. The patient is in the active phase of labor. During this time, labor progress has been protracted. For this reason, I recommend  section for persistent category II tracing.       10:35 PM   Primary low-transverse  section        Wt Readings from Last 1 Encounters:   23 89.8 kg (198 lb)     Vital Signs:   Date/Time Temp Pulse Resp BP MAP (mmHg) SpO2 O2 Device Cardiac (WDL) Patient Position - Orthostatic VS   23 1910 98.6 °F (37 °C) 71 18 105/70 75 98 % None (Room air) -- Lying   23 0005 -- 93 -- 100/58 -- -- -- -- --   23 2024 -- 109 Abnormal  -- 111/59 -- -- -- -- --   23 0000 -- 73 -- 100/62 -- -- -- -- --   23 0255 98.6 °F (37 °C) 75 18 110/63 -- -- -- -- --       Pertinent Labs/Diagnostic Test Results:   Results from last 7 days   Lab Units 23  0803 23  2212   WBC Thousand/uL 16.25* 10.46*   HEMOGLOBIN g/dL 12.2 13.3   HEMATOCRIT % 35.7 38.9   PLATELETS Thousands/uL 176 201   NEUTROS ABS Thousands/µL 13.23*  --          Results from last 7 days   Lab Units 23  1546   GLUCOSE UA  negative   PROTEIN UA  negative     Past Medical History:   Diagnosis Date    Asthma     Endometriosis     GERD (gastroesophageal reflux  disease)     Herpes     IBS (irritable bowel syndrome)      Present on Admission:  **None**      Admitting Diagnosis: Encounter for elective induction of labor [Z34.90]  39 weeks gestation of pregnancy [Z3A.39]  Age/Sex: 38 y.o. female  Admission Orders:  Scheduled Medications:  [START ON 12/21/2023] ibuprofen, 600 mg, Oral, Q6H      Continuous IV Infusions:  lactated ringers infusion  Rate: 125 mL/hr Dose: 125 mL/hr  Freq: Continuous Route: IV  Indications of Use: IV Hydration  Last Dose: 125 mL/hr (12/19/23 1809)  Start: 12/17/23 2200 End: 12/19/23 2359      oxytocin (PITOCIN) 30 Units in lactated ringers 500 mL infusion  Freq: Continuous PRN Route: IV  Last Dose: 250 thomas-units/min (12/19/23 2244)  Start: 12/19/23 2244 End: 12/19/23 2328      oxytocin (PITOCIN) 30 Units in lactated ringers 500 mL infusion  Rate: 1-30 mL/hr Dose: 1-30 thomas-units/min  Freq: Titrated Route: IV  Last Dose: Stopped (12/19/23 2153)  Start: 12/19/23 1700 End: 12/19/23 2359      ropivacaine 0.2% PCEA  Continuous Rate: 10 mL/hr  PCEA Dose: 5 mL  PCEA Lock-out: 10 Minutes  Number of Doses per Hour: 3 doses/hr  Freq: Continuous Route: EP  Last Dose: Stopped (12/19/23 2200)  Start: 12/19/23 1830 End: 12/19/23 2359           PRN Meds:  acetaminophen, 650 mg, Oral, Q4H PRN 12/20 x2  calcium carbonate, 1,000 mg, Oral, TID PRN  diphenhydrAMINE, 25 mg, Intravenous, Q6H PRN  diphenhydrAMINE, 25 mg, Intravenous, Q6H PRN  docusate sodium, 100 mg, Oral, BID PRN  fentaNYL, 25 mcg, Intravenous, Q3 min PRN  HYDROmorphone, 0.5 mg, Intravenous, Q5 Min PRN  HYDROmorphone, 0.5 mg, Intravenous, Q2H PRN  metoclopramide, 5 mg, Intravenous, Q6H PRN  nalbuphine, 10 mg, Intravenous, Q3H PRN  naloxone, 0.1 mg, Intravenous, Q3 min PRN  ondansetron, 4 mg, Intravenous, Once PRN  ondansetron, 4 mg, Intravenous, Q8H PRN  oxyCODONE, 10 mg, Oral, Q4H PRN  oxyCODONE, 5 mg, Oral, Q4H PRN  simethicone, 80 mg, Oral, 4x Daily PRN 12/20 x1          Network Utilization Review  Department  ATTENTION: Please call with any questions or concerns to [phone] and carefully listen to the prompts so that you are directed to the right person. All voicemails are confidential.   For Discharge needs, contact Care Management DC Support Team at [phone] opt. [phone]  Send all requests for admission clinical reviews, approved or denied determinations and any other requests to dedicated fax number below belonging to the [facility] where the patient is receiving treatment. List of dedicated fax numbers for the Facilities:  FACILITY NAME UR FAX NUMBER   ADMISSION DENIALS (Administrative/Medical Necessity) [phone]   DISCHARGE SUPPORT TEAM (NETWORK) [phone]   PARENT CHILD HEALTH (Maternity/NICU/Pediatrics) [phone]   [facility] [phone]   [facility] [phone]   [facility] [phone]   [facility] [phone]   [facility] [phone]   [facility] [phone]   [facility] [phone]   [facility] [phone]   [facility] [phone]   [facility] [phone]   [facility] [phone]

## 2023-12-21 NOTE — PLAN OF CARE

## 2023-12-22 VITALS
HEIGHT: 61 IN | WEIGHT: 198 LBS | RESPIRATION RATE: 18 BRPM | SYSTOLIC BLOOD PRESSURE: 111 MMHG | TEMPERATURE: 97.4 F | BODY MASS INDEX: 37.38 KG/M2 | OXYGEN SATURATION: 98 % | HEART RATE: 71 BPM | DIASTOLIC BLOOD PRESSURE: 71 MMHG

## 2023-12-22 PROCEDURE — NC001 PR NO CHARGE: Performed by: OBSTETRICS & GYNECOLOGY

## 2023-12-22 PROCEDURE — 99024 POSTOP FOLLOW-UP VISIT: CPT | Performed by: OBSTETRICS & GYNECOLOGY

## 2023-12-22 RX ORDER — ACETAMINOPHEN 325 MG/1
650 TABLET ORAL EVERY 4 HOURS PRN
Qty: 30 TABLET | Refills: 0 | Status: SHIPPED | OUTPATIENT
Start: 2023-12-22 | End: 2023-12-26

## 2023-12-22 RX ORDER — IBUPROFEN 600 MG/1
600 TABLET ORAL EVERY 6 HOURS
Qty: 30 TABLET | Refills: 0 | Status: SHIPPED | OUTPATIENT
Start: 2023-12-22

## 2023-12-22 RX ADMIN — ACETAMINOPHEN 650 MG: 325 TABLET, FILM COATED ORAL at 08:39

## 2023-12-22 RX ADMIN — IBUPROFEN 600 MG: 600 TABLET, FILM COATED ORAL at 05:56

## 2023-12-22 RX ADMIN — IBUPROFEN 600 MG: 600 TABLET, FILM COATED ORAL at 12:49

## 2023-12-22 RX ADMIN — ACETAMINOPHEN 650 MG: 325 TABLET, FILM COATED ORAL at 12:51

## 2023-12-22 RX ADMIN — ACETAMINOPHEN 650 MG: 325 TABLET, FILM COATED ORAL at 03:56

## 2023-12-22 NOTE — LACTATION NOTE
This note was copied from a baby's chart.  Met with parents to follow up and discuss the Breastfeeding Discharge Booklet.     Baby is currently at a 5.3% weight loss, having appropriate output and repeat bilirubin was low.    Discussed nipple care with expressed colostrum and coconut balm that mother is using.    Discussed feeding log to could be continued using once home for up to the week and discussed the importance of ensuring that baby feeds 8-12x in 24 hours and that baby has 6 wet diapers or more that are becoming more dilute as well as soiled diapers that are transitioning demonstrated by color change from meconium to a yellow/gold seedy loose stool by day 5.    Mother was given resources to look up medications to ensure they are safe with breastfeeding, by communicating with the [facility], [facility], [facility] as well as using [employer] (assisted mother to pin to home screen on personal phone).    Discussed engorgement time frame (when mature milk comes in) and management as well as how to deal with conditions that may occur while breastfeeding (plugged ducts, milk blebs and mastitis) and when is appropriate to communicate with her OB/GYN and/or a lactation consultant.    Discussed handouts how to set up a pump, how to cycle (stimulation vs expression phases during a pumping session), importance of flange fit and trying different sizes to ensure best fit, milk storage and how to properly clean parts. Discussed handouts for tips on pumping when returning to work and paced bottle feeding .    Addressed questions regarding diet that parents had during the encounter.    Discussed community resources for continued support in breastfeeding once discharged home. She was encouraged to communicate with [facility], insurance for lactation home visits and/or with her baby's pediatrician for lactation support/services that could be offered in the practice or close to home.    Parents were  encouraged to call for further questions that arise prior to discharge.

## 2023-12-22 NOTE — DISCHARGE SUMMARY
CS Discharge Summary - Toshia Morel 38 y.o. female MRN: [id number]    Unit/Bed#: -01 Encounter: [id number]    Admission Date: 2023     Discharge Date: 23    Admitting Diagnosis: Encounter for elective induction of labor [Z34.90]  39 weeks gestation of pregnancy [Z3A.39]    Discharge Diagnosis: same    Procedures: primary  section, low transverse incision    Attending: Yamil Scmhid MD    Hospital Course:     Toshia Morel is a 38 y.o.  who was admitted at 39.5 wks for induction of labor for AMA. During her labor course, she had persistent category II tracing.   section was recommended.    She underwent an uncomplicated  section.   was transferred to  nursery. Patient tolerated the procedure well and was transferred to recovery in stable condition.     Her post-operative course was not complicated. Postoperative Hgb was   Lab Results   Component Value Date    HGB 12.2 2023     Postoperative pain was well controlled with oral analgesics.    On day of discharge, she was ambulating and able to reasonably perform all ADLs. She was voiding and had appropriate bowel function. Pain was well controlled. She was discharged home on post-operative day #3 without complications. Patient was instructed to follow up with her OB as an outpatient and was given appropriate warnings to call provider if she develops signs of infection or uncontrolled pain.    Complications: None    Condition at discharge: good     Discharge instructions/Information to patient and family:   See after visit summary for information provided to patient and family.      Provisions for Follow-Up Care:  See after visit summary for information related to follow-up care and any pertinent home health orders.      Disposition: Home    Planned Readmission: No    Discharge Medications:   For a complete list of the patient's medications, please refer to her med rec.

## 2023-12-22 NOTE — PROGRESS NOTES
Post- Progress note    Patient is post-op day 3 from a Primary Low Transverse  delivery.     Pain: controlled  Tolerating Oral Intake: yes  Voiding: yes  Flatus: yes  Ambulating: yes  Breastfeeding: Breastfeeding  Chest Pain: no  Shortness of Breath: no  Leg Pain/Discomfort: no  Lochia: normal  Other: Reports pain in back at epidural site.     Objective:   Vitals:    23 0753   BP: 111/71   Pulse: 71   Resp: 18   Temp: (!) 97.4 °F (36.3 °C)   SpO2: 98%     No intake or output data in the 24 hours ending 23 1047    Physical Exam:  General: in no apparent distress, well developed and well nourished, and non-toxic  Lungs: normal effort  Abdomen: abdomen is soft without significant tenderness, masses, organomegaly or guarding  Fundus: Firm and non-tender, 2 below the umbilicus  Incision: clean, dry, and intact  Lower extremeties: nontender  Other: Epidural site is non tender with no erythema, drainage, ecchymosis    Labs/Tests:   Lab Results   Component Value Date    WBC 16.25 (H) 2023    HGB 12.2 2023    HCT 35.7 2023    MCV 88 2023     2023         Assessment and Plan:  38 y.o. year-old , postoperative day 3 status-post Primary Low Transverse  delivery    Continue routine postpartum care  Encourage ambulation  Discharge    Esme Nagel MD

## 2023-12-22 NOTE — PLAN OF CARE
Problem: PAIN - ADULT  Goal: Verbalizes/displays adequate comfort level or baseline comfort level  Description: Interventions:  - Encourage patient to monitor pain and request assistance  - Assess pain using appropriate pain scale  - Administer analgesics based on type and severity of pain and evaluate response  - Implement non-pharmacological measures as appropriate and evaluate response  - Consider cultural and social influences on pain and pain management  - Notify physician/advanced practitioner if interventions unsuccessful or patient reports new pain  Outcome: Progressing     Problem: INFECTION - ADULT  Goal: Absence or prevention of progression during hospitalization  Description: INTERVENTIONS:  - Assess and monitor for signs and symptoms of infection  - Monitor lab/diagnostic results  - Monitor all insertion sites, i.e. indwelling lines, tubes, and drains  - Monitor endotracheal if appropriate and nasal secretions for changes in amount and color  - [place] appropriate cooling/warming therapies per order  - Administer medications as ordered  - Instruct and encourage patient and family to use good hand hygiene technique  - Identify and instruct in appropriate isolation precautions for identified infection/condition  Outcome: Progressing  Goal: Absence of fever/infection during neutropenic period  Description: INTERVENTIONS:  - Monitor WBC    Outcome: Progressing     Problem: SAFETY ADULT  Goal: Patient will remain free of falls  Description: INTERVENTIONS:  - Educate patient/family on patient safety including physical limitations  - Instruct patient to call for assistance with activity   - Consult OT/PT to assist with strengthening/mobility   - Keep Call bell within reach  - Keep bed low and locked with side rails adjusted as appropriate  - Keep care items and personal belongings within reach  - Initiate and maintain comfort rounds  - Make Fall Risk Sign visible to staff  - Apply yellow socks and bracelet  for high fall risk patients  - Consider moving patient to room near nurses station  Outcome: Progressing  Goal: Maintain or return to baseline ADL function  Description: INTERVENTIONS:  -  Assess patient's ability to carry out ADLs; assess patient's baseline for ADL function and identify physical deficits which impact ability to perform ADLs (bathing, care of mouth/teeth, toileting, grooming, dressing, etc.)  - Assess/evaluate cause of self-care deficits   - Assess range of motion  - Assess patient's mobility; develop plan if impaired  - Assess patient's need for assistive devices and provide as appropriate  - Encourage maximum independence but intervene and supervise when necessary  - Involve family in performance of ADLs  - Assess for home care needs following discharge   - Consider OT consult to assist with ADL evaluation and planning for discharge  - Provide patient education as appropriate  Outcome: Progressing  Goal: Maintains/Returns to pre admission functional level  Description: INTERVENTIONS:  - Perform AM-PAC 6 Click Basic Mobility/ Daily Activity assessment daily.  - Set and communicate daily mobility goal to care team and patient/family/caregiver.   - Collaborate with rehabilitation services on mobility goals if consulted  - Out of bed for toileting  - Record patient progress and toleration of activity level   Outcome: Progressing     Problem: Knowledge Deficit  Goal: Patient/family/caregiver demonstrates understanding of disease process, treatment plan, medications, and discharge instructions  Description: Complete learning assessment and assess knowledge base.  Interventions:  - Provide teaching at level of understanding  - Provide teaching via preferred learning methods  Outcome: Progressing     Problem: DISCHARGE PLANNING  Goal: Discharge to home or other facility with appropriate resources  Description: INTERVENTIONS:  - Identify barriers to discharge w/patient and caregiver  - Arrange for needed  discharge resources and transportation as appropriate  - Identify discharge learning needs (meds, wound care, etc.)  - Arrange for interpretive services to assist at discharge as needed  - Refer to Case Management Department for coordinating discharge planning if the patient needs post-hospital services based on physician/advanced practitioner order or complex needs related to functional status, cognitive ability, or social support system  Outcome: Progressing     Problem: POSTPARTUM  Goal: Experiences normal postpartum course  Description: INTERVENTIONS:  - Monitor maternal vital signs  - Assess uterine involution and lochia  Outcome: Progressing  Goal: Appropriate maternal -  bonding  Description: INTERVENTIONS:  - Identify family support  - Assess for appropriate maternal/infant bonding   -Encourage maternal/infant bonding opportunities  - Referral to  or  as needed  Outcome: Progressing  Goal: Establishment of infant feeding pattern  Description: INTERVENTIONS:  - Assess breast/bottle feeding  - Refer to lactation as needed  Outcome: Progressing  Goal: Incision(s), wounds(s) or drain site(s) healing without S/S of infection  Description: INTERVENTIONS  - Assess and document dressing, incision, wound bed, drain sites and surrounding tissue  - Provide patient and family education  Outcome: Progressing

## 2023-12-22 NOTE — PLAN OF CARE

## 2023-12-26 ENCOUNTER — POSTPARTUM VISIT (OUTPATIENT)
Dept: OBGYN CLINIC | Facility: CLINIC | Age: 38
End: 2023-12-26
Payer: COMMERCIAL

## 2023-12-26 VITALS
HEIGHT: 62 IN | BODY MASS INDEX: 33.01 KG/M2 | WEIGHT: 179.4 LBS | SYSTOLIC BLOOD PRESSURE: 118 MMHG | DIASTOLIC BLOOD PRESSURE: 68 MMHG

## 2023-12-26 DIAGNOSIS — R30.0 DYSURIA: Primary | ICD-10-CM

## 2023-12-26 DIAGNOSIS — M79.89 LOCALIZED SWELLING OF LOWER EXTREMITY: ICD-10-CM

## 2023-12-26 LAB
SL AMB  POCT GLUCOSE, UA: NEGATIVE
SL AMB LEUKOCYTE ESTERASE,UA: NEGATIVE
SL AMB POCT BILIRUBIN,UA: NEGATIVE
SL AMB POCT BLOOD,UA: ABNORMAL
SL AMB POCT CLARITY,UA: CLEAR
SL AMB POCT COLOR,UA: YELLOW
SL AMB POCT KETONES,UA: NEGATIVE
SL AMB POCT NITRITE,UA: NEGATIVE
SL AMB POCT PH,UA: 5
SL AMB POCT SPECIFIC GRAVITY,UA: 1
SL AMB POCT URINE PROTEIN: NEGATIVE
SL AMB POCT UROBILINOGEN: 0.2

## 2023-12-26 PROCEDURE — 81002 URINALYSIS NONAUTO W/O SCOPE: CPT | Performed by: OBSTETRICS & GYNECOLOGY

## 2023-12-26 PROCEDURE — 99213 OFFICE O/P EST LOW 20 MIN: CPT | Performed by: OBSTETRICS & GYNECOLOGY

## 2023-12-26 RX ORDER — CEPHALEXIN 500 MG/1
500 CAPSULE ORAL EVERY 12 HOURS SCHEDULED
Qty: 14 CAPSULE | Refills: 0 | Status: SHIPPED | OUTPATIENT
Start: 2023-12-26 | End: 2024-01-02

## 2023-12-26 RX ORDER — FUROSEMIDE 20 MG/1
20 TABLET ORAL DAILY
Qty: 5 TABLET | Refills: 0 | Status: SHIPPED | OUTPATIENT
Start: 2023-12-26 | End: 2023-12-31

## 2023-12-26 RX ORDER — PHENAZOPYRIDINE HYDROCHLORIDE 100 MG/1
100 TABLET, FILM COATED ORAL 3 TIMES DAILY PRN
Qty: 10 TABLET | Refills: 0 | Status: SHIPPED | OUTPATIENT
Start: 2023-12-26

## 2023-12-26 NOTE — PROGRESS NOTES
"[facility] - [address], [address], [place] [address]    Assessment/Plan:  1. Dysuria  Comments:  pain with urination and bladder spasm.  Urine culture to lab - will treat empirically with Keflex and pyridium  Orders:  -     Urine culture  -     POCT urine dip  -     phenazopyridine (PYRIDIUM) 100 mg tablet; Take 1 tablet (100 mg total) by mouth 3 (three) times a day as needed for bladder spasms  -     cephalexin (KEFLEX) 500 mg capsule; Take 1 capsule (500 mg total) by mouth every 12 (twelve) hours for 7 days    2. Localized swelling of lower extremity  Comments:  LE swelling - some improvement.  Offered 5 days Lasix to help diurese.  Orders:  -     furosemide (LASIX) 20 mg tablet; Take 1 tablet (20 mg total) by mouth daily for 5 days    3. Breast feeding status of mother  Comments:  some nipple soreness.  APNO to compounding pharmacy.  Orders:  -     APNO compound (mupirocin ointment 2%-betamethasone ointment 0.1%-miconazole powder 2%); Apply a small amount to nipple after each feeding. Clean nipple w/soap and water before next feeding.        Subjective:   Toshia Morel is a 38 y.o.  female.  CC: burning with urination      HPI:   7 days s/p LTCS after prolonged labor and NRFHT.    Burning with urination over last few days.   Gets pain on /off \"in bladder\" after urination last for 30 minutes to and hour.   \"Feels like you have to pee, but then you don't and pain continues.\"    Otherwise pain controlled postpartum.  Nursing.  Bleeding less.        Gyn History  Patient's last menstrual period was 2023 (approximate).       Last pap smear: 2021    She  reports that she is not currently sexually active and has had partner(s) who are male. She reports using the following method of birth control/protection: None.       OB History      Past Medical History:  No date: Asthma  No date: Endometriosis  No date: GERD (gastroesophageal reflux disease)  No date: Herpes  No date: " "IBS (irritable bowel syndrome)     Past Surgical History:  No date: APPENDECTOMY  2013: CYSTECTOMY; Right      Comment:  right ovary  2023: IN  DELIVERY ONLY; N/A      Comment:  Procedure:  SECTION ();  Surgeon:                Yamil Schmid MD;  Location: [facility];  Service:                Obstetrics     Social History     Tobacco Use    Smoking status: Never     Passive exposure: Never    Smokeless tobacco: Never   Substance Use Topics    Alcohol use: Not Currently    Drug use: Never          Current Outpatient Medications:     calcium carbonate (TUMS) 500 mg chewable tablet, Chew 2 tablets if needed for indigestion or heartburn, Disp: , Rfl:     ibuprofen (MOTRIN) 600 mg tablet, Take 1 tablet (600 mg total) by mouth every 6 (six) hours, Disp: 30 tablet, Rfl: 0    Prenatal Vit-Fe Fumarate-FA (PRENATAL VITAMINS PO), Take by mouth, Disp: , Rfl:     valACYclovir (VALTREX) 500 mg tablet, Take 1 tablet (500 mg total) by mouth 2 (two) times a day, Disp: 180 tablet, Rfl: 0        She is allergic to amoxicillin and dicyclomine..    ROS: Review of Systems   Constitutional: Negative.    Gastrointestinal: Negative.    Genitourinary:  Positive for dysuria and vaginal bleeding (normal lochia).   Psychiatric/Behavioral: Negative.         Objective:  /68 (BP Location: Left arm, Patient Position: Sitting, Cuff Size: Large)   Ht 5' 1.75\" (1.568 m)   Wt 81.4 kg (179 lb 6.4 oz)   LMP 2023 (Approximate)   Breastfeeding Yes   BMI 33.08 kg/m²      Physical Exam  Constitutional:       Appearance: Normal appearance.   Abdominal:      General: A surgical scar is present.      Palpations: Abdomen is soft.      Comments: Incision closed - steristrips removed   Genitourinary:     General: Normal vulva.      Vagina: Normal.   Neurological:      Mental Status: She is alert.   Psychiatric:         Mood and Affect: Mood normal.         Behavior: Behavior normal.         "

## 2023-12-28 ENCOUNTER — PATIENT MESSAGE (OUTPATIENT)
Dept: OBGYN CLINIC | Facility: CLINIC | Age: 38
End: 2023-12-28

## 2023-12-28 LAB
BACTERIA UR CULT: NORMAL
Lab: NO GROWTH

## 2023-12-29 LAB — PLACENTA IN STORAGE: NORMAL

## 2023-12-29 NOTE — PATIENT COMMUNICATION
Patient called into office reporting still having burning when urinating.  She also reports having a jolt of pain in her lower abdomen right after she urinates and the pain is only isolated to immediately after urinating.  She reports last day of antibiotics and still feels the pain.  Dr. Link, please advise for any further recommendations.

## 2023-12-29 NOTE — PROGRESS NOTES
Talked with Dr. Eleno Ho, Radiology and asked about imaging for possible bladder injury.  He said if just looking at bladder and not ureters, he recom CT cystogram.  Given her pain is at urethra and bladder, not flank, I feel this is sufficient.      I sent [employer] msg to Toshia asking if pyridium helped pain at all.  Urine culture negative and my exam of her urethra in office normal.  If pyridium helped, could just be bladder irritation from  and may need more time and pyridium to resolve.      Will wait for her response.

## 2024-01-05 ENCOUNTER — OFFICE VISIT (OUTPATIENT)
Dept: POSTPARTUM | Facility: CLINIC | Age: 39
End: 2024-01-05
Payer: COMMERCIAL

## 2024-01-05 PROCEDURE — 99214 OFFICE O/P EST MOD 30 MIN: CPT | Performed by: PEDIATRICS

## 2024-01-05 NOTE — PROGRESS NOTES
INITIAL BREAST FEEDING EVALUATION    Informant/Relationship: Toshia (mom/self), Brandon (FOB)     Discussion of General Lactation Issues: Breastfeeding was going well in the hspital. It has not been so easy since going home. Latching is pinching and painful. Baby does not seem content after feedings, seems to be feeding constantly.     Infant is 2 weeks and 3 days old today.        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 Circumcision: yes - tests, circumcision, otherwise stayed with Mom   Blood sugar issues:no  NICU stay:no  Jaundice:no  Phototherapy:no  Supplement given: (list supplement and method used as well as reason(s):no    Past Medical History:   Diagnosis Date    Asthma     Endometriosis     GERD (gastroesophageal reflux disease)     Herpes     IBS (irritable bowel syndrome)          Current Outpatient Medications:     APNO compound (mupirocin ointment 2%-betamethasone ointment 0.1%-miconazole powder 2%), Apply a small amount to nipple after each feeding. Clean nipple w/soap and water before next feeding., Disp: 30 g, Rfl: 1    calcium carbonate (TUMS) 500 mg chewable tablet, Chew 2 tablets if needed for indigestion or heartburn, Disp: , Rfl:     furosemide (LASIX) 20 mg tablet, Take 1 tablet (20 mg total) by mouth daily for 5 days, Disp: 5 tablet, Rfl: 0    ibuprofen (MOTRIN) 600 mg tablet, Take 1 tablet (600 mg total) by mouth every 6 (six) hours,  Disp: 30 tablet, Rfl: 0    oxybutynin (DITROPAN-XL) 10 MG 24 hr tablet, Take 1 tablet (10 mg total) by mouth daily at bedtime, Disp: 30 tablet, Rfl: 1    phenazopyridine (PYRIDIUM) 100 mg tablet, Take 1 tablet (100 mg total) by mouth 3 (three) times a day as needed for bladder spasms, Disp: 10 tablet, Rfl: 0    Prenatal Vit-Fe Fumarate-FA (PRENATAL VITAMINS PO), Take by mouth, Disp: , Rfl:     valACYclovir (VALTREX) 500 mg tablet, Take 1 tablet (500 mg total) by mouth 2 (two) times a day, Disp: 180 tablet, Rfl: 0    Allergies   Allergen Reactions    Amoxicillin Anaphylaxis, Hives, Itching, GI Intolerance, Palpitations, Rash and Shortness Of Breath    Dicyclomine Anxiety       Social History     Substance and Sexual Activity   Drug Use Never       Social History     Interval Breastfeeding History:    Frequency of breast feedin x in past 24 hours   Does mother feel breastfeeding is effective: No  Does infant appear satisfied after nursing:No  Stooling pattern normal: Yes  Urinating frequently:Yes  Using shield or shells: No    Alternative/Artificial Feedings:   Bottle: Yes, Dr. Brown's, level 1 nipple. Family is doing paced bottle feeding   Cup: No  Syringe/Finger: No           Formula Type: Enfamil gentlese                      Amount: 2 oz             Breast Milk:                      Amount: 2-3 oz              Frequency Q 2.5 hours during the day, 3-4 hr overnight between feedings     Alternating breast milk and formula each feeding     Elimination Problems: Dad is concerned since starting formula baby has only 1-2 larger stools daily versus 2-3 when he was only on breast milk.     He was fussy more overnight recently and this is something that made her feel Mayo isn't getting what he needs.     Equipment:    Using haakaa ladybugs to catch letdown on opposite breast     Pump            Type: Spectra S1             Frequency of Use: about every 2 hours, at least 8 x per day     2-3 per pump in about 20  "minutes. Mom has had her flanges measured, she is using 21 mm insert. She is using a hands free bra.     Measured nipples at 18 mm on the right breast and 17 mm on the left.     Equipment Problems: yes just want to ensure correct flange size.     Mom:  Breast: Normal, R breast is larger and coon than the left, medium breasts, somewhat wide spacing. R breast is tender. Full feeling more on the outer quadrants of the breast.   Nipple Assessment in General: healing cracks noted bilaterally on the nipple face.   Mother's Awareness of Feeding Cues                 Recognizes: Yes                  Verbalizes: Yes - but feeling that he is cueing often and not sure if all these are hunger cues   Support System: FOB is good support   History of Breastfeeding: first time breastfeeding   Changes/Stressors/Violence: Painful latching, baby does not seem satisfied with feedings at the breast, and slow weight gain. Baby does \"fight at the breast\"   Concerns/Goals: Mom wants to achieve comfortable positioning and latching. Her goal is to use as little formula as possible. She cares that he is fed and happy more than anything.     Problems with Mom: attachment related pain     Physical Exam  Constitutional:       Appearance: Normal appearance.   HENT:      Head: Normocephalic.   Pulmonary:      Effort: Pulmonary effort is normal.   Musculoskeletal:         General: Normal range of motion.      Cervical back: Normal range of motion.   Neurological:      General: No focal deficit present.      Mental Status: She is alert and oriented to person, place, and time.   Skin:     General: Skin is warm.      Capillary Refill: Capillary refill takes less than 2 seconds.   Psychiatric:         Mood and Affect: Mood normal.         Behavior: Behavior normal.         Thought Content: Thought content normal.         Judgment: Judgment normal.         Infant:  Behaviors: Alert  Color: Pink  Birth weight: 4230 g   Current weight: 4030 g "     Problems with infant: sleepy at the breast, slow weight gain       General Appearance:  Alert, active, no distress                             Head:  Normocephalic, AFOF, sutures opposed                             Eyes:  Conjunctiva clear, no drainage                              Ears:  Normally placed, no anomolies                             Nose:  Septum intact, no drainage or erythema                           Mouth:  No lesions. Tongue moves towards the right of his mouth, extends past his lip line and lateralizes well. Mid mouth and tongue is rounded when lifted. Full cup and gentle peristalsis on gloved finger.                     Neck:  Supple, symmetrical, trachea midline                 Respiratory:  No grunting, flaring, retractions, breath sounds clear and equal            Cardiovascular:  Regular rate and rhythm. No murmur. Adequate perfusion/capillary refill. Femoral pulse present                    Abdomen:   Soft, non-tender, no masses, bowel sounds present, no HSM             Genitourinary:  Normal male, testes descended, no discharge, swelling, or pain, anus patent                          Spine:   No abnormalities noted        Musculoskeletal:  Full range of motion          Skin/Hair/Nails:   Skin warm, dry, and intact, no rashes or abnormal dyspigmentation or lesions                Neurologic:   No abnormal movement, tone appropriate for gestational age    Nelly Assessment for Lingual Frenulum Function    Appearance Items Function Items   Appearance of tongue when lifted  2: Round or square   Lateralization  2: Complete   Elasticity of frenulum  2: Very elastic   Lift of tongue  2: Tip to mid-mouth     Length of lingual frenulum when tongue lifted  lingual frenulum length: 1: 1 cm     Extension of tongue  2: Tip over lower lip   Attachment of lingual frenulum to tongue  2: Posterior to tip   Spread of anterior tongue  2: Complete   Attachment of lingual frenulum to inferior alveolar  ridge  2: Attached to floor of mouth or well below ridge Cupping  2: Entire edge, firm cup   Ankyloglossia Grading:  Class I: mild, 12-16 mm  Class II: moderate, 8-11 mm  Class III: severe, 3-7 mm  ClassIV: complete, less than 3 mm Peristalsis  2: Complete, anterior to posterior       SCORE:    Appearance: 10 (<8=ankyloglossia)  Function: 14 (<11=ankyloglossia) Snapback  2: None         Latch:  Efficiency:               Lips Flanged: Yes              Depth of latch: wide              Audible Swallow: Yes, when actively sucking               Visible Milk: Yes              Wide Open/ Asymmetrical: Yes              Suck Swallow Cycle: Breathing: yes, Coordinated: yes  Nipple Assessment after latch: slight pinching   Latch Problems: Latch on tenderness reported. One Mayo is latched deeply, and some adjustments made by manually guiding the chin down, Mom denies pain with this latch. She is able to latch him independently on the opposite breast without pain. He is sleepy initially, active sucking mainly when Mom is having a letdown. Once switching to the opposite breast he is more consistently active with regular suck/swallow pattern.     Position:  Infant's Ergonomics/Body               Body Alignment: Yes               Head Supported: Yes               Close to Mom's body/ Lifted/ Supported: Yes               Mom's Ergonomics/Body: Yes                           Supported: Yes                           Sitting Back: Yes                           Brings Baby to her breast: Yes  Positioning Problems: Reviewed laid back/biological nurturing odalis and Toshia returns this demonstration properly.       Handouts:   Paced bottle feeding, Latch Check List, and Breast Compressions     Education:  Reviewed Latch: importance of deep latch without pain.   Reviewed Positioning for Dyad: proper alignment and head angle when positioning at the breast   Reviewed Frequency/Supply & Demand: offer the breast at each feeding, pump if  baby is not latching and effective transferring milk.   Reviewed Infant:Cues and varied States of Awareness: watch for hunger cues, feed on demand. If baby seems satisfied at the breast (calm, relaxed sleeping, breasts are softer) no need to pump or supplement   Reviewed Infant Elimination: goal of 6+ wets and 2-3 stools per day   Reviewed Alternative/Artificial Feedings: paced bottle feeding technique demonstrated  Reviewed Mom/Breast care: gentle handling of the breast at all times, as well as tips for healing sore nipples.    Reviewed Equipment: Hand pump and electric pump general guidance, Discussed proper flange fit, how to measure        Plan:      Reassurance provided that baby is growing well at this time. Cont with positioning adjustments and watch for signs of effective feeding. Pump if wanting to replace feeding at the breast with bottle feeding or if latching becomes painful. Gentle handling of the breast at all times to preserve integrity.  [facility] for breastfeeding support as needed or ongoing concerns with latching comfort and milk transfer. Follow up scheduled for 2 weeks.     I have spent 90 minutes with Patient and family today in which greater than 50% of this time was spent in counseling/coordination of care regarding Patient and family education.

## 2024-01-05 NOTE — PATIENT INSTRUCTIONS
-You are doing so well with getting off to a strong start with breastfeeding your sweet boy! Keep up the great work, Mama!   -Remember the adjustments we made today when positioning Mayo. With he's nursing, watch for signs throughout the feeding that baby is effectively removing milk. You will feel strong tugging, hear swallowing and will feel your breasts get softer after nursing. Perform gentle breast compressions and switch breasts as needed to keep him actively feeding.   -No need to pump if baby is latching and feeding well at the breast on demand.   -Pump only as needed for missed feedings at the breast or for uncomfortable engorgement, utilize flange fit and settings that are comfortable for you, pumping should not be painful!   -Nipples measured today at 17 mm on the left and 18 mm on the right breast. -Spectra Flange Fit Guide : What's My Breast Size?  Flange Guide  [employer]    -Healing techniques for nipples : nipple cream of choice and airflow, or apply cream and cover with a piece of wax or parchment paper over top in between feedings. Silverette cups should be used on their own, with some hand expressed milk rubbed over the nipple prior to putting them in place.   - Storing and Thawing Breast Milk  [facility] Breastfeeding Support (usda.gov)    -Follow up in 2 weeks for ongoing support. Call our center in the meantime as needed for questions or concerns you have.

## 2024-01-08 NOTE — PROGRESS NOTES
I have reviewed the notes, assessments, and/or procedures performed by Naa Ramos RN, IBCLC, I concur with her/his documentation of Toshia Swain MD 01/07/24

## 2024-01-22 ENCOUNTER — HOSPITAL ENCOUNTER (OUTPATIENT)
Dept: CT IMAGING | Facility: HOSPITAL | Age: 39
Discharge: HOME | End: 2024-01-22
Attending: OBSTETRICS & GYNECOLOGY

## 2024-01-31 ENCOUNTER — POSTPARTUM VISIT (OUTPATIENT)
Dept: OBGYN CLINIC | Facility: CLINIC | Age: 39
End: 2024-01-31

## 2024-01-31 VITALS
SYSTOLIC BLOOD PRESSURE: 108 MMHG | HEIGHT: 62 IN | DIASTOLIC BLOOD PRESSURE: 58 MMHG | BODY MASS INDEX: 32.97 KG/M2 | WEIGHT: 179.2 LBS

## 2024-01-31 DIAGNOSIS — M62.89 PELVIC FLOOR DYSFUNCTION IN FEMALE: ICD-10-CM

## 2024-01-31 DIAGNOSIS — Z87.42 HISTORY OF CLITORAL PHIMOSIS: ICD-10-CM

## 2024-01-31 PROCEDURE — 99024 POSTOP FOLLOW-UP VISIT: CPT | Performed by: STUDENT IN AN ORGANIZED HEALTH CARE EDUCATION/TRAINING PROGRAM

## 2024-01-31 RX ORDER — FAMOTIDINE 20 MG/1
20 TABLET, FILM COATED ORAL DAILY
COMMUNITY

## 2024-01-31 RX ORDER — CLOBETASOL PROPIONATE 0.5 MG/G
OINTMENT TOPICAL
Qty: 30 G | Refills: 0 | Status: SHIPPED | OUTPATIENT
Start: 2024-01-31

## 2024-01-31 NOTE — PROGRESS NOTES
[facility] [address], [address], [place] [address]    Assessment/Plan:  Toshia is a 38 y.o. year old  who presents for postpartum visit.    Routine Postpartum Care  Normal postpartum exam  Contraception: Desires Nexplanon. Will have office staff verify benefits and schedule.   Depression Screen: Medium risk. Reports mood stable. Has therapist. Declines medication. Denies SI/HI.   Feeding: Breast with bottle supplement  Cervical cancer screening Up to Date  Follow up in: 3 months for annual exam or as needed.    Additional Problems:  1. Postpartum examination following  delivery    2. Pelvic floor dysfunction in female  -     Ambulatory Referral to Physical Therapy; Future    3. History of clitoral phimosis  -     clobetasol (TEMOVATE) 0.05 % ointment; Apply topically daily at bedtime As needed          Subjective:     CC: Postpartum visit    Toshia Morel is a 38 y.o. y.o. female  who presents for a postpartum visit.     She is 6 weeks postpartum following a primary  section on 2023 at 40 weeks. Postpartum course has been uncomplicated.     Bleeding staining only, now with return of menses. Bowel function is  normal with exception of some constipation . Bladder function-- reports sensation of incomplete emptying and some stress urinary incontinence, although this has improved substantially from prior. Patient is not sexually active.     Postpartum Depression: Medium Risk (2024)    Velpen  Depression Scale     Last EPDS Total Score: 7     Last EPDS Self Harm Result: Never       The following portions of the patient's history were reviewed and updated as appropriate: allergies, current medications, past family history, past medical history, obstetric history, gynecologic history, past social history, past surgical history and problem list.    Objective:  /58 (BP Location: Left arm, Patient Position: Sitting, Cuff Size: Standard)   " Ht 5' 1.75\" (1.568 m)   Wt 81.3 kg (179 lb 3.2 oz)   LMP 2023 (Approximate)   Breastfeeding Yes Comment: and formula  BMI 33.04 kg/m²   Pregravid Weight/BMI: 72.6 kg (160 lb) (BMI 30.25)  Current Weight: 81.3 kg (179 lb 3.2 oz)   Total Weight Gain: 17.2 kg (38 lb)   Pre- Vitals      Flowsheet Row Most Recent Value   Prenatal Assessment    Prenatal Vitals    Blood Pressure 108/58   Weight - Scale 81.3 kg (179 lb 3.2 oz)   Urine Albumin/Glucose    Dilation/Effacement/Station    Vaginal Drainage    Edema              Chaperone present? Yes: Rosanna Andrea MA.    General Appearance: alert and oriented, in no acute distress.   Abdomen: Soft, non-tender, non-distended, no masses, no rebound or guarding.  Pelvic:       External genitalia: Normal appearance, no abnormal pigmentation, no lesions or masses. Normal Bartholin's and [place]'s. Obstetric laceration well-healed.       Urinary system: Urethral meatus normal, bladder non-tender.      Vaginal: normal mucosa without prolapse or lesions. Normal-appearing physiologic discharge.      Cervix: Normal-appearing, well-epithelialized, no gross lesions or masses. No cervical motion tenderness.      Adnexa: No adnexal masses or tenderness noted.      Uterus: Normal-sized, regular contour, midline, mobile, no uterine tenderness.   Extremities: Normal range of motion.   Skin: normal, no rash or abnormalities  Neurologic: alert, oriented x3  Psychiatric: Appropriate affect, mood stable, cooperative with exam.        Yamil Schmid MD  2024 7:05 PM  "

## 2024-02-26 ENCOUNTER — OFFICE VISIT (OUTPATIENT)
Dept: URGENT CARE | Facility: CLINIC | Age: 39
End: 2024-02-26
Payer: COMMERCIAL

## 2024-02-26 VITALS
BODY MASS INDEX: 33.99 KG/M2 | HEART RATE: 68 BPM | SYSTOLIC BLOOD PRESSURE: 118 MMHG | WEIGHT: 180 LBS | DIASTOLIC BLOOD PRESSURE: 68 MMHG | HEIGHT: 61 IN | OXYGEN SATURATION: 97 % | RESPIRATION RATE: 16 BRPM | TEMPERATURE: 96.7 F

## 2024-02-26 DIAGNOSIS — H00.012 HORDEOLUM EXTERNUM OF RIGHT LOWER EYELID: Primary | ICD-10-CM

## 2024-02-26 PROCEDURE — G0382 LEV 3 HOSP TYPE B ED VISIT: HCPCS | Performed by: PHYSICIAN ASSISTANT

## 2024-02-26 NOTE — PROGRESS NOTES
[facility]        NAME: Toshia Morel is a 38 y.o. female  : 1985    MRN: [id number]  DATE: 2024  TIME: 1:32 PM    Assessment and Plan   Hordeolum externum of right lower eyelid [H00.012]  1. Hordeolum externum of right lower eyelid              Patient Instructions       Follow up with PCP in 3-5 days.  Proceed to  ER if symptoms worsen.    Chief Complaint     Chief Complaint   Patient presents with   • Eye Problem     Pt reports last night she noted right lower eyelid tenderness/swelling. Reports eye itching. Removed contacts. Applying warm compresses. Currently breastfeeding.         History of Present Illness       Patient presents with localized area of redness and swelling over the medial portion of the right lower eyelid.  Applied warm compresses and it has already improved from this morning.  Symptoms started last night.  Denies visual disturbances, painful eye movements, eye redness.    Eye Problem   Associated symptoms include itching. Pertinent negatives include no eye discharge, eye redness, fever or photophobia.       Review of Systems   Review of Systems   Constitutional:  Negative for fever.   Eyes:  Positive for itching. Negative for photophobia, pain, discharge, redness and visual disturbance.   Skin:  Positive for rash.       Current Medications       Current Outpatient Medications:   •  Prenatal Vit-Fe Fumarate-FA (PRENATAL VITAMINS PO), Take by mouth, Disp: , Rfl:   •  APNO compound (mupirocin ointment 2%-betamethasone ointment 0.1%-miconazole powder 2%), Apply a small amount to nipple after each feeding. Clean nipple w/soap and water before next feeding. (Patient not taking: Reported on 2024), Disp: 30 g, Rfl: 1  •  clobetasol (TEMOVATE) 0.05 % ointment, Apply topically daily at bedtime As needed (Patient not taking: Reported on 2024), Disp: 30 g, Rfl: 0  •  famotidine (PEPCID) 20 mg tablet, Take 20 mg by mouth daily (Patient not taking: Reported on  "2024), Disp: , Rfl:     Current Allergies     Allergies as of 2024 - Reviewed 2024   Allergen Reaction Noted   • Amoxicillin Anaphylaxis, Hives, Itching, GI Intolerance, Palpitations, Rash, and Shortness Of Breath 2019   • Dicyclomine Anxiety 2022            The following portions of the patient's history were reviewed and updated as appropriate: allergies, current medications, past family history, past medical history, past social history, past surgical history and problem list.     Past Medical History:   Diagnosis Date   • Asthma    • Endometriosis    • GERD (gastroesophageal reflux disease)    • Herpes    • IBS (irritable bowel syndrome)        Past Surgical History:   Procedure Laterality Date   • APPENDECTOMY     • CYSTECTOMY Right     right ovary   • [place]  DELIVERY ONLY N/A 2023    Procedure:  SECTION ();  Surgeon: Yamil Schmid MD;  Location: [facility];  Service: Obstetrics       Family History   Problem Relation Age of Onset   • Breast cancer Mother    • Heart disease Father          Medications have been verified.        Objective   /68   Pulse 68   Temp (!) 96.7 °F (35.9 °C)   Resp 16   Ht 5' 1\" (1.549 m)   Wt 81.6 kg (180 lb)   SpO2 97%   BMI 34.01 kg/m²   No LMP recorded.       Physical Exam     Physical Exam  Constitutional:       Appearance: Normal appearance.   Eyes:      General:         Right eye: No discharge.         Left eye: No discharge.      Extraocular Movements: Extraocular movements intact.      Conjunctiva/sclera: Conjunctivae normal.      Pupils: Pupils are equal, round, and reactive to light.     Neurological:      Mental Status: She is alert.   Psychiatric:         Mood and Affect: Mood normal.         Behavior: Behavior normal.               "

## 2024-03-11 ENCOUNTER — EVALUATION (OUTPATIENT)
Dept: PHYSICAL THERAPY | Facility: CLINIC | Age: 39
End: 2024-03-11
Payer: COMMERCIAL

## 2024-03-11 DIAGNOSIS — N39.3 SUI (STRESS URINARY INCONTINENCE, FEMALE): ICD-10-CM

## 2024-03-11 DIAGNOSIS — R10.2 PELVIC PAIN IN FEMALE: ICD-10-CM

## 2024-03-11 DIAGNOSIS — M62.89 PELVIC FLOOR DYSFUNCTION IN FEMALE: Primary | ICD-10-CM

## 2024-03-11 DIAGNOSIS — N94.10 DYSPAREUNIA IN FEMALE: ICD-10-CM

## 2024-03-11 PROCEDURE — 97110 THERAPEUTIC EXERCISES: CPT

## 2024-03-11 PROCEDURE — 97162 PT EVAL MOD COMPLEX 30 MIN: CPT

## 2024-03-11 PROCEDURE — 97112 NEUROMUSCULAR REEDUCATION: CPT

## 2024-03-11 NOTE — PROGRESS NOTES
PT Evaluation     Today's date: 3/11/2024  Patient name: Toshia Morel  : 1985  MRN: [id number]  Referring provider: Yamil Schmid MD  Dx:   Encounter Diagnosis     ICD-10-CM    1. Pelvic floor dysfunction in female  M62.89 Ambulatory Referral to Physical Therapy      2. Pelvic pain in female  R10.2       3. SANDRA (stress urinary incontinence, female)  N39.3       4. Dyspareunia in female  N94.10           Start Time: 1120  Stop Time: 1203  Total time in clinic (min): 43 minutes    Assessment  Assessment details: The pt is a 38 year old female who presents to skilled physical therapy services due to a decline in functional status related to pelvic pain and bladder/bowel dysfunction. Upon completion of the IE, the pt presents with impairments in pain (low back, anterior pelvis), breathing mechanics, posture, muscle tension/palpation tenderness, as well as neuromuscular control/strength/endurance of PF musculature. As a result of these impairments, the pt has difficulty with the following functional activities: urinary urgency, urinary frequency, sexual intimacy (pain), bladder continence, and bowel function (pain, strain, urgency). POC will include manual therapy for flexibility/mobility and symptom modulation, modalities (PRN), TE/TA/NR interventions for functional strength and neuromuscular control, HEP, and pt education. The pt will continue to benefit from skilled physical therapy services to address impairments in order to improve her quality of life and bladder/bowel function.  Impairments: abnormal or restricted ROM, activity intolerance, impaired physical strength, lacks appropriate home exercise program, pain with function, poor posture  and poor body mechanics    Symptom irritability: moderateBarriers to therapy: Recent pregnancy; Currently lactating -- PMH includes: genital Herpes, obesity during pregnancy   Understanding of Dx/Px/POC: good   Prognosis: good    Goals  - Pt will have decreased  lumbopelvic muscle overactivity to mild-trace to reflect a reduction in spasms in order to improve functional abilities.  - Pt will present with at least an 80% decrease discomfort with pelvic touch for improved tolerance to manual therapy and self-treatment techniques.  - Pt will report at least an 80% reduction in discomfort with either palpation or intimacy in order to improve quality of life as well as to tolerate gynecological examination and/or intimacy.  - Pt will be able to tolerate vaginal penetration with 0-2/10 pain for improved tolerance for gynecological examination and/or intercourse.  - Pt is able to cough, laugh, and sneeze without bladder leakage in order to improve quality of life and decrease need/dependency on panty liners or period underwear.  - Pt reports at least an 80% reduction in urinary incontinence indicating decreased effect of urinary incontinence on function.  - Pt reports at least an 80% reduction in pain and/or straining with bowel movements indicating decreased effect of constipation on function.  - Pt has implemented urge suppression strategies throughout the day and reports average voiding interval is 2-3 hours upon discharge in order to have more functional bladder functioning.  - Decrease nocturia to 1 voids/night for more thorough sleep.     Plan  Patient would benefit from: skilled physical therapy  Referral necessary: Yes  Planned therapy interventions: abdominal trunk stabilization, joint mobilization, manual therapy, body mechanics training, neuromuscular re-education, breathing training, patient education, postural training, self care, strengthening, stretching, flexibility, functional ROM exercises, therapeutic activities, therapeutic exercise and home exercise program  Frequency: 1x week  Duration in weeks: 12  Plan of Care beginning date: 3/11/2024  Plan of Care expiration date: 5/31/2024  Treatment plan discussed with: patient        PT Pelvic Floor Subjective:  "  History of Present Illness:     -- \"I have like no core strength after what happened.\" (Pregnancy, breast feeding) -- 3 months ago  Date of onset: 2023    Pt reports an onset/increase in low back pain and anterior pelvic pain around the time of labor/birth of her infant. As a result of LBP, the pt has difficulty with lifting and carrying activities (e.g., picking up/carrying her  son). Pt also reports an increase in pelvic pain with examination and sexual intimacy.        Not a recurrent problem     Social Support:     Lives in:  Multiple-level home    Lives with:  Spouse and young children (2 dogs)    Work status: unemployed    Life stress severity: mild    History of Depression: yes  Diet and Exercise:    Diet:balanced nutrition    Exercise type: walking    Wants return to routine exercise    Not exercising due to: bladder incontinence  OB/ gyn History    Gestational History:     Prior Pregnancy: Yes      Number of prior pregnancies: 1    Number of term pregnancies: 1    Delivery Type:  section      Number of caesarian sections: 1    Delivery Complications:  Emergency  section after approximately 50 hours of labor. Pt was induced for labor, but presented with intense contractions and minimal dilation after being induced. -- Pt reports/explains that the experience of labor/birthing and emergency  section was traumatic.    Preciously experienced post-partum depression; Intermittent participation in women's support group      Menstrual History:    Date of last menstrual period: 3/10/2024    Painful periods:  Difficulty managing menstrual pain    Tolerates tampons: yes (\"I know it's there.\" -- Pain with incertion)    Birth control: no contraception (Not currently)  no hormone replacement therapy  Pt's most recent menstruation cycle has been more painful and heavier than previously  Bladder Function:     Voiding Difficulties positive for: urgency and frequent urination      " "Voiding Difficulties negative for: straining and incomplete emptying (Prior experience; Takes longer to make sure bladder empties)       Voiding Difficulties comments:     Voiding frequency: every 1-2 hours    Urinary leakage: urine leakage (Not every day -- ~4x/wk)    Urinary leakage aggravated by: coughing, sneezing, exercise and laughing    Urinary leakage not aggravated by: bending, standing up, walking to the bathroom, seeing a toilet and post-void dribble    Nocturia (episodes per night): 2    Painful urination: Yes (Occasional -- previously pain for 15-20 minutes after urination; now brief)      Fluid Intake Type:  Water  Incontinence Management:     Pads/Diaper Use:  None    Pads/Diapers Additional Comments: Period underwear occasionally  Bowel Function:     Voiding DIfficulties: urgency, painful defecating and constipation      Bowel Function comments:  MiraLAX for symptom management/regularity    Bowel frequency: daily and every 2 days    Stool softener use: stool softeners    Uses \"squatty potty\": Squatty Potty  Sexual Function:     Sexually Active:  Sexually active and single partner    Pain during intercourse: Yes      Lubrication Use: Yes    pain causes abstinence    Patient wishes to return to having intercourse: currently unable to have intercourse but wants toSexual function: vulvar pain and vaginal pain  Pain:     Quality:  Sharp (Tight; sore)    Aggravating factors:  Urination and intercourse  Treatments:     Current treatment: physical therapy    Patient Goals:     Patient goals for therapy:  Decreased interpersonal problems, decreased pain, fully empty bladder or bowels, improved bladder or bowel function, improved comfort, improved pain management and improved quality of life      Objective     Static Posture     Shoulders  Rounded.    Postural Observations    Additional Postural Observation Details  Breathing mechanics (greater chest breathing)    Palpation   Left   Tenderness of the adductor " brevis, rectus abdominus and transverse abdominus.     Right   Tenderness of the adductor brevis, rectus abdominus and transverse abdominus.       Abdominal Assessment:        Skin inspection:   scars present.   Additional skin inspection details:  section      General Perineum Exam:     General perineum exam comments: Examination/assessment of perineum and internal PF deferred to first follow-up appointment due to time dedicated on subjective report and pt education    Visual Inspection of Perineum:   PFM Contraction Comments: Examination/assessment of perineum and internal PF deferred to first follow-up appointment due to time dedicated on subjective report and pt education    Pelvic Organ Prolapse   Comments: Examination/assessment of perineum and internal PF deferred to first follow-up appointment due to time dedicated on subjective report and pt education        Pelvic Floor Muscle Exam:             pelvic floor exam consent given by patient                  Precautions: PMH includes: Genital herpes, Advanced maternal age (1st pregnancy), Obesity (affecting pregnancy in 3rd trimester)       3/11            Manuals             STM/MFR             Fascial Mobilization                                       Neuro Re-Ed             Diaphragmatic Breathing 10x            Pt Edu PF Contraction             Kegel             TA Contraction                                                    Ther Ex             Happy Baby             Butterfly Stretch                                       PF Physical Therapy; POC KS            Anatomy as it relates to pt's presentation/symptoms KS            Address pt questions PRN KS            HEP KS                         Ther Activity                                       Gait Training                                       Modalities

## 2024-03-17 NOTE — PROGRESS NOTES
"Daily Note     Today's date: 3/18/2024  Patient name: Toshia Morel  : 1985  MRN: [id number]  Referring provider: Yamil Schmid MD  Dx:   Encounter Diagnosis     ICD-10-CM    1. Pelvic floor dysfunction in female  M62.89       2. Pelvic pain in female  R10.2       3. SANDRA (stress urinary incontinence, female)  N39.3       4. Dyspareunia in female  N94.10           Start Time: 1111  Stop Time: 1202  Total time in clinic (min): 51 minutes    Subjective: The pt reports that she feels tired. She explains that she had 2 hours of sleep last night because she woke up to assist her infant. -- \"Steven\" pain with urination is back since her period ended. Pt notes the need to make conscious effort to finish urinating. -- Pt notes doing breathing exercises before going to bed. -- Pt denies leakage over past week. -- Pt notes that she has been dealing with chronic constipation.      Objective: See treatment diary below      Assessment: The pt participated in a skilled physical therapy session that focused on manual intervention and pt education. An internal PF assessment/examination was performed today. The pt consented with examination and pt check-ins were performed throughout the examination. Considering the pt's history of pelvic pain with examination and intimacy, the assessment was performed more slowly and per pt tolerance. The pt presented with significant tenderness with palpation externally along the labia minora bilaterally. Light touch/STM was effective as noted by a progressive decrease in tenderness and improved tolerance with examination. She demonstrated a drawing in with both contraction and bearing down of the PF, which reflects significant muscle tension and impaired neuromuscular control. STM and gentle MFR were performed to address muscle tension and palpation tenderness of the PF musculature internally. She presented with greater tenderness on the left side of the pelvis as well as along the " anterior pelvis (e.g., pubic bone). Manual intervention to the internal PF musculature was effective as noted by a decrease in muscle tension and a lessening of palpation tenderness. The pt was educated about the use of vaginal dilators to assist with addressing pain with insertion and improve tolerance for intimacy and PF examinations. She tolerated treatment well. The pt would benefit from continued PT to address impairments in order to improve her quality of life, bladder function, and symptoms of chronic pelvic pain.      Plan: Continue per plan of care.      Precautions: PMH includes: Genital herpes, Advanced maternal age (1st pregnancy), Obesity (affecting pregnancy in 3rd trimester)       3/11 3/18           Manuals             STM/MFR             Fascial Mobilization             Internal PF assessment/STM/MFR  KS (vaginal)                        Neuro Re-Ed             Diaphragmatic Breathing 10x            Pt Edu PF Contraction             Kegel             TA Contraction                                                    Ther Ex             Happy Baby             Butterfly Stretch                                       PF Physical Therapy; POC KS KS           Anatomy as it relates to pt's presentation/symptoms KS KS           Address pt questions PRN KS KS           HEP KS KS           Dilators - symptoms management/address pain w/ intimacy  KS - Pt education           Update on pt's symptoms/status/function  KS                        Ther Activity                                       Gait Training                                       Modalities

## 2024-03-18 ENCOUNTER — OFFICE VISIT (OUTPATIENT)
Dept: PHYSICAL THERAPY | Facility: CLINIC | Age: 39
End: 2024-03-18
Payer: COMMERCIAL

## 2024-03-18 DIAGNOSIS — R10.2 PELVIC PAIN IN FEMALE: ICD-10-CM

## 2024-03-18 DIAGNOSIS — N94.10 DYSPAREUNIA IN FEMALE: ICD-10-CM

## 2024-03-18 DIAGNOSIS — M62.89 PELVIC FLOOR DYSFUNCTION IN FEMALE: Primary | ICD-10-CM

## 2024-03-18 DIAGNOSIS — N39.3 SUI (STRESS URINARY INCONTINENCE, FEMALE): ICD-10-CM

## 2024-03-18 PROCEDURE — 97140 MANUAL THERAPY 1/> REGIONS: CPT

## 2024-03-18 PROCEDURE — 97110 THERAPEUTIC EXERCISES: CPT

## 2024-03-25 ENCOUNTER — OFFICE VISIT (OUTPATIENT)
Dept: PHYSICAL THERAPY | Facility: CLINIC | Age: 39
End: 2024-03-25
Payer: COMMERCIAL

## 2024-03-25 DIAGNOSIS — N39.3 SUI (STRESS URINARY INCONTINENCE, FEMALE): ICD-10-CM

## 2024-03-25 DIAGNOSIS — N94.10 DYSPAREUNIA IN FEMALE: ICD-10-CM

## 2024-03-25 DIAGNOSIS — M62.89 PELVIC FLOOR DYSFUNCTION IN FEMALE: Primary | ICD-10-CM

## 2024-03-25 DIAGNOSIS — R10.2 PELVIC PAIN IN FEMALE: ICD-10-CM

## 2024-03-25 PROCEDURE — 97140 MANUAL THERAPY 1/> REGIONS: CPT

## 2024-03-25 PROCEDURE — 97110 THERAPEUTIC EXERCISES: CPT

## 2024-03-25 NOTE — PROGRESS NOTES
"Daily Note     Today's date: 3/25/2024  Patient name: Toshia Morel  : 1985  MRN: [id number]  Referring provider: Yamil Schmid MD  Dx:   Encounter Diagnosis     ICD-10-CM    1. Pelvic floor dysfunction in female  M62.89       2. Pelvic pain in female  R10.2       3. SANDRA (stress urinary incontinence, female)  N39.3       4. Dyspareunia in female  N94.10           Start Time: 1119  Stop Time: 1200  Total time in clinic (min): 41 minutes    Subjective: \"Pretty good actually.\" -- BM: \"A little trickier.\" Pt notes constipation over past few days. Pt notes abdominal cramping.  -- Urination: \"That's been fine.\" \"I sneezed several times this morning and didn't have an accident.\"       Objective: See treatment diary below      Assessment: The pt participated in a skilled physical therapy session that focused on manual intervention and pt education. In response to the pt's symptoms and presentation of abdominal discomfort with constipation, manual intervention was provided to address muscle tension within the posterior pelvis. She presented with muscle tension and intermittent muscle contraction/spasms of the posterior pelvic muscles. She also presented with a progressive onset of nausea, which improved with water and a change in position from side-lying to seated. Fascial/soft tissue mobilization and abdominal massage were performed to address abdominal discomfort. She presented with tenderness in the RLQ and LLQ. Mobilization to tissues of the abdomen was effective as noted by pt's subjective report of relief. Pt was educated on abdominal massage to aid in symptom management and GI motility. She tolerated treatment well. The pt would benefit from continued PT  to address impairments in order to improve her quality of life, bladder/bowel function, and symptoms of chronic pelvic pain.       Plan: Continue per plan of care.      Precautions: PMH includes: Genital herpes, Advanced maternal age (1st pregnancy), " Obesity (affecting pregnancy in 3rd trimester)       3/11 3/18 3/25          Manuals             STM/MFR   KS (Posterior pelvis PF)          Fascial Mobilization   KS (Abdominal)          Internal PF assessment/STM/MFR  KS (vaginal)           Pelvic Compression   KS                        Neuro Re-Ed             Diaphragmatic Breathing 10x            Pt Edu PF Contraction             Kegel             TA Contraction                                                    Ther Ex             Happy Baby             Butterfly Stretch                                       PF Physical Therapy; POC KS KS KS          Anatomy as it relates to pt's presentation/symptoms KS KS KS          Address pt questions PRN KS KS KS          HEP KS KS           Dilators - symptoms management/address pain w/ intimacy  KS - Pt education           Update on pt's symptoms/status/function  KS KS                       Ther Activity                                       Gait Training                                       Modalities

## 2024-04-01 ENCOUNTER — APPOINTMENT (OUTPATIENT)
Dept: PHYSICAL THERAPY | Facility: CLINIC | Age: 39
End: 2024-04-01
Payer: COMMERCIAL

## 2024-04-08 ENCOUNTER — OFFICE VISIT (OUTPATIENT)
Dept: PHYSICAL THERAPY | Facility: CLINIC | Age: 39
End: 2024-04-08
Payer: COMMERCIAL

## 2024-04-08 DIAGNOSIS — N39.3 SUI (STRESS URINARY INCONTINENCE, FEMALE): ICD-10-CM

## 2024-04-08 DIAGNOSIS — R10.2 PELVIC PAIN IN FEMALE: ICD-10-CM

## 2024-04-08 DIAGNOSIS — N94.10 DYSPAREUNIA IN FEMALE: ICD-10-CM

## 2024-04-08 DIAGNOSIS — M62.89 PELVIC FLOOR DYSFUNCTION IN FEMALE: Primary | ICD-10-CM

## 2024-04-08 PROCEDURE — 97140 MANUAL THERAPY 1/> REGIONS: CPT

## 2024-04-08 PROCEDURE — 97110 THERAPEUTIC EXERCISES: CPT

## 2024-04-08 NOTE — PROGRESS NOTES
"Daily Note     Today's date: 2024  Patient name: Toshia Morel  : 1985  MRN: [id number]  Referring provider: Yamil Schmid MD  Dx:   Encounter Diagnosis     ICD-10-CM    1. Pelvic floor dysfunction in female  M62.89       2. Pelvic pain in female  R10.2       3. SANDRA (stress urinary incontinence, female)  N39.3       4. Dyspareunia in female  N94.10           Start Time: 1120  Stop Time: 1200  Total time in clinic (min): 40 minutes    Subjective: Pt reports that she missed her appointment last week because her mother had knee surgery. Pt reports that she has had an increase in urinary leakage recently. Urinary leakage with sneezing, laughing, and coughing. Pt used a tampon today because she has her period. \"It was really painful.\" Pt notes that she feels the tampon continuously. This is her 2nd period since child birth. -- Regarding BM, pt notes a flare of symptoms with her menstrual cycle. Pt reports rectal spasms and feeling backed up. Straining with having a BM and \"it never feels fully done.\" Pt had a BM this morning. -- Stress levels can spike pretty quickly. -- Pt reports low back pain after labor/birthing. Pt notes low back pain with lifting/picking up her child.       Objective: See treatment diary below      Assessment: The pt participated in a skilled physical therapy session that focused on manual intervention and therapeutic exercise. In response to the pt's presentation of an increase in abdominal pressure and symptoms of constipation, abdominal massage was performed to aid in GI motility and peristasis. Scar mobilization was also provided to address fascial and soft tissue restrictions that contribute to intra-abdominal and suprapubic pressure.  scar presented with greatest restrictions with superior mobilization. Pt was educated on techniques for abdominal massage and scar mobilization to aid in tissue mobility between treatment sessions. She tolerated treatment well and " manual intervention was effective as noted by the pt's subjective report of a decrease/alleviation of abdominal discomfort. The pt would benefit from continued PT to address impairments in order to improve her quality of life, bladder/bowel function, and symptoms of chronic pelvic pain.       Plan: Continue per plan of care.  Progress note during next visit.      Precautions: PMH includes: Genital herpes, Advanced maternal age (1st pregnancy), Obesity (affecting pregnancy in 3rd trimester)       3/11 3/18 3/25 4/8         Manuals             STM/MFR   KS (Posterior pelvis PF)          Fascial Mobilization   KS (Abdominal) KS (Abdominal)         Internal PF assessment/STM/MFR  KS (vaginal)           Pelvic Compression   KS  KS         Abdominal massage    KS         Scar Mobilization    KS                      Neuro Re-Ed             Diaphragmatic Breathing 10x            Pt Edu PF Contraction             Kegel             TA Contraction                                                    Ther Ex             Happy Baby             Butterfly Stretch             LTR    10x ea dir.         Open Book    5x R s/l                      PF Physical Therapy; POC KS KS KS KS         Anatomy as it relates to pt's presentation/symptoms KS KS KS KS         Address pt questions PRN KS KS KS KS         HEP KS KS  KS         Dilators - symptoms management/address pain w/ intimacy  KS - Pt education           Update on pt's symptoms/status/function  KS KS KS         Pt edu scar mobilization    KS         Pt edu abdominal massage    KS                      Ther Activity                                       Gait Training                                       Modalities

## 2024-04-15 ENCOUNTER — OFFICE VISIT (OUTPATIENT)
Dept: PHYSICAL THERAPY | Facility: CLINIC | Age: 39
End: 2024-04-15
Payer: COMMERCIAL

## 2024-04-15 DIAGNOSIS — N39.3 SUI (STRESS URINARY INCONTINENCE, FEMALE): ICD-10-CM

## 2024-04-15 DIAGNOSIS — M62.89 PELVIC FLOOR DYSFUNCTION IN FEMALE: Primary | ICD-10-CM

## 2024-04-15 DIAGNOSIS — N94.10 DYSPAREUNIA IN FEMALE: ICD-10-CM

## 2024-04-15 DIAGNOSIS — R10.2 PELVIC PAIN IN FEMALE: ICD-10-CM

## 2024-04-15 PROCEDURE — 97110 THERAPEUTIC EXERCISES: CPT

## 2024-04-15 PROCEDURE — 97112 NEUROMUSCULAR REEDUCATION: CPT

## 2024-04-15 PROCEDURE — 97530 THERAPEUTIC ACTIVITIES: CPT

## 2024-04-15 NOTE — PROGRESS NOTES
PT Progress Report     Today's date: 4/15/2024  Patient name: Toshia Morel  : 1985  MRN: [id number]  Referring provider: Yamil Schmid MD  Dx:   Encounter Diagnosis     ICD-10-CM    1. Pelvic floor dysfunction in female  M62.89       2. SANDRA (stress urinary incontinence, female)  N39.3       3. Pelvic pain in female  R10.2       4. Dyspareunia in female  N94.10             Start Time: 1118  Stop Time: 1210  Total time in clinic (min): 52 minutes    Assessment  Assessment details: The pt is a 38 year old female who presents to skilled physical therapy services due to a decline in functional status related to pelvic pain and bladder/bowel dysfunction. At this point in her POC, she has received 5 skilled physical therapy sessions. Upon completion of the IE, she presents with improvements in urinary continence (decreased stress incontinence w/ sneezing/coughing) and nocturia. She continues to present with impairments in pain (low back, anterior pelvis), breathing mechanics, posture, muscle tension/palpation tenderness, urinary continence, constipation, as well as neuromuscular control/strength/endurance of PF musculature. During this past week, she also experienced an episode of fecal incontinence when she was unable to make it to the bathroom in time, partly due to lack of availability (in car while stuck in traffic). As a result of these impairments, the pt has difficulty with the following functional activities: urinary urgency, urinary frequency, sexual intimacy (pain), urinary continence, fecal continence, and bowel function (pain, strain, urgency). POC will include manual therapy for flexibility/mobility and symptom modulation, modalities (PRN), TE/TA/NR interventions for functional strength and neuromuscular control, HEP, and pt education. The pt will continue to benefit from skilled physical therapy services to address impairments in order to improve her quality of life and bladder/bowel  function.    Thank you for the referral.    Impairments: abnormal muscle firing, abnormal muscle tone, abnormal or restricted ROM, activity intolerance, impaired physical strength, lacks appropriate home exercise program, pain with function, poor posture  and poor body mechanics    Symptom irritability: moderateBarriers to therapy: Recent pregnancy; Currently lactating -- PMH includes: genital Herpes, obesity during pregnancy   Understanding of Dx/Px/POC: good   Prognosis: good    Goals  - Pt will have decreased lumbopelvic muscle overactivity to mild-trace to reflect a reduction in spasms in order to improve functional abilities.  - Pt will present with at least an 80% decrease discomfort with pelvic touch for improved tolerance to manual therapy and self-treatment techniques.  - Pt will report at least an 80% reduction in discomfort with either palpation or intimacy in order to improve quality of life as well as to tolerate gynecological examination and/or intimacy.  - Pt will be able to tolerate vaginal penetration with 0-2/10 pain for improved tolerance for gynecological examination and/or intercourse.  - Pt is able to cough, laugh, and sneeze without bladder leakage in order to improve quality of life and decrease need/dependency on panty liners or period underwear.  - Pt reports at least an 80% reduction in urinary incontinence indicating decreased effect of urinary incontinence on function.  - Pt reports at least an 80% reduction in pain and/or straining with bowel movements indicating decreased effect of constipation on function.  - Pt has implemented urge suppression strategies throughout the day and reports average voiding interval is 2-3 hours upon discharge in order to have more functional bladder functioning.  - Decrease nocturia to 1 voids/night for more thorough sleep.     Plan  Patient would benefit from: skilled physical therapy  Referral necessary: Yes  Planned therapy interventions: abdominal  "trunk stabilization, joint mobilization, manual therapy, body mechanics training, neuromuscular re-education, breathing training, patient education, postural training, self care, strengthening, stretching, flexibility, functional ROM exercises, therapeutic activities, therapeutic exercise and home exercise program  Frequency: 1x week  Duration in weeks: 12  Plan of Care beginning date: 3/11/2024  Plan of Care expiration date: 5/31/2024  Treatment plan discussed with: patient      PT Pelvic Floor Subjective:   History of Present Illness:     PROGRESS REPORT:  Regarding symptoms, \"not great.\" Pt reports fecal incontinence last week one day. Pt reports that she was in the car and unable to manage it. Pt explains that she was in the car for 2 hours and about 10 minutes from home stuck in traffic when she experienced the fecal incontinence. -- Pt reports that she is currently in a state of constipation. Has not has had BM since the event ~3 days ago. Decreased willingness to eat due to negative feelings and memory of fecal incontinence. -- Pt adds that she feels that she has less control with urine and bowel control since she had her infant. -- Pt report that she feels that she is done with urinating when she goes and notes a decrease in waking up in the middle of the night to urinate. Improvement with coughing/sneezing. No leakage with coughing/sneezing. -- She reports difficulty with knowing that she needs to go until there is more urgency with urinating. She associates this with decreased sensation after labor/birthing. -- More abdominal pain since pregnancy/labor. \"It just doesn't feel settled.\" --  -- Improved sensation around scar since previous appointment and use of scar mobilization as part of HEP.  -- \"A lot of stress\" this past week. Pt reports that her  will be leaving for a trip (Friday to Monday). Pt notes that this will be the first time that she will be with her baby alone for this long. Pt notes " "that she does not have additional help.     INITIAL EVALUATION:  -- \"I have like no core strength after what happened.\" (Pregnancy, breast feeding) -- 3 months ago  Date of onset: 2023    Pt reports an onset/increase in low back pain and anterior pelvic pain around the time of labor/birth of her infant. As a result of LBP, the pt has difficulty with lifting and carrying activities (e.g., picking up/carrying her  son). Pt also reports an increase in pelvic pain with examination and sexual intimacy.        Not a recurrent problem     Social Support:     Lives in:  Multiple-level home    Lives with:  Spouse and young children (2 dogs)    Work status: unemployed    Life stress severity: mild    History of Depression: yes  Diet and Exercise:    Diet:balanced nutrition    Exercise type: walking    Wants return to routine exercise    Not exercising due to: bladder incontinence  OB/ gyn History    Gestational History:     Prior Pregnancy: Yes      Number of prior pregnancies: 1    Number of term pregnancies: 1    Delivery Type:  section      Number of caesarian sections: 1    Delivery Complications:  Emergency  section after approximately 50 hours of labor. Pt was induced for labor, but presented with intense contractions and minimal dilation after being induced. -- Pt reports/explains that the experience of labor/birthing and emergency  section was traumatic.    Preciously experienced post-partum depression; Intermittent participation in women's support group      Menstrual History:    Date of last menstrual period: 3/10/2024    Painful periods:  Difficulty managing menstrual pain    Tolerates tampons: yes (\"I know it's there.\" -- Pain with incertion)    Birth control: no contraception (Not currently)  no hormone replacement therapy  Pt's most recent menstruation cycle has been more painful and heavier than previously  Bladder Function:     Voiding Difficulties positive for: urgency and " "frequent urination      Voiding Difficulties negative for: straining and incomplete emptying (Prior experience; Takes longer to make sure bladder empties)       Voiding Difficulties comments:     Voiding frequency: every 1-2 hours    Urinary leakage: urine leakage (IE: Not every day -- ~4x/wk)    Urinary leakage aggravated by: exercise and laughing    Urinary leakage not aggravated by: coughing, sneezing, bending, standing up, walking to the bathroom, seeing a toilet and post-void dribble    Nocturia (episodes per night): 2    Painful urination: Yes (Occasional -- previously pain for 15-20 minutes after urination; now brief)      Fluid Intake Type:  Water  Incontinence Management:     Pads/Diaper Use:  None    Pads/Diapers Additional Comments: Period underwear occasionally  Bowel Function:     Voiding DIfficulties: urgency, painful defecating and constipation      Bowel Function comments:  MiraLAX for symptom management/regularity    Bowel frequency: daily and every 2 days    Stool softener use: stool softeners    Uses \"squatty potty\": Squatty Potty  Sexual Function:     Sexually Active:  Sexually active and single partner    Pain during intercourse: Yes      Lubrication Use: Yes    pain causes abstinence    Patient wishes to return to having intercourse: currently unable to have intercourse but wants toSexual function: vulvar pain and vaginal pain  Pain:     Quality:  Sharp (Tight; sore)    Aggravating factors:  Urination and intercourse  Treatments:     Current treatment: physical therapy    Patient Goals:     Patient goals for therapy:  Decreased interpersonal problems, decreased pain, fully empty bladder or bowels, improved bladder or bowel function, improved comfort, improved pain management and improved quality of life      Objective     Static Posture     Shoulders  Rounded.    Postural Observations    Additional Postural Observation Details  Breathing mechanics (greater chest breathing)    Palpation   Left " "  Tenderness of the adductor brevis, rectus abdominus and transverse abdominus.     Right   Tenderness of the adductor brevis, rectus abdominus and transverse abdominus.       Abdominal Assessment:      Position: supine exam    Skin inspection:   scars present.   Additional skin inspection details:  section      General Perineum Exam:     General perineum exam comments: 3/18/2024 Treatment Note: \"Considering the pt's history of pelvic pain with examination and intimacy, the assessment was performed more slowly and per pt tolerance. The pt presented with significant tenderness with palpation externally along the labia minora bilaterally. Light touch/STM was effective as noted by a progressive decrease in tenderness and improved tolerance with examination. She demonstrated a drawing in with both contraction and bearing down of the PF, which reflects significant muscle tension and impaired neuromuscular control. STM and gentle MFR were performed to address muscle tension and palpation tenderness of the PF musculature internally. She presented with greater tenderness on the left side of the pelvis as well as along the anterior pelvis (e.g., pubic bone).\"    NV: Re-examination/assessment of perineum and internal PF deferred to next follow-up appointment due to time dedicated on subjective report and pt education    Visual Inspection of Perineum:   Excursion of perineal body in cephalad direction with contraction of pelvic floor muscles (PFM): weak  Excursion of perineal body in caudal direction with relaxation of pelvic floor muscles (PFM): unable and weak  PFM Contraction Comments: 3/18/2024 Treatment Note: \"Considering the pt's history of pelvic pain with examination and intimacy, the assessment was performed more slowly and per pt tolerance. The pt presented with significant tenderness with palpation externally along the labia minora bilaterally. Light touch/STM was effective as noted by a progressive decrease " "in tenderness and improved tolerance with examination. She demonstrated a drawing in with both contraction and bearing down of the PF, which reflects significant muscle tension and impaired neuromuscular control. STM and gentle MFR were performed to address muscle tension and palpation tenderness of the PF musculature internally. She presented with greater tenderness on the left side of the pelvis as well as along the anterior pelvis (e.g., pubic bone).\"    TX: Re-examination/assessment of perineum and internal PF deferred to next follow-up appointment due to time dedicated on subjective report and pt education  Sphincter Tone Resting: increased  Sphincter Tone Squeeze: increased  Sensation: intact  Tenderness: provoked    Pelvic Organ Prolapse   Comments: 3/18/2024 Treatment Note: \"Considering the pt's history of pelvic pain with examination and intimacy, the assessment was performed more slowly and per pt tolerance. The pt presented with significant tenderness with palpation externally along the labia minora bilaterally. Light touch/STM was effective as noted by a progressive decrease in tenderness and improved tolerance with examination. She demonstrated a drawing in with both contraction and bearing down of the PF, which reflects significant muscle tension and impaired neuromuscular control. STM and gentle MFR were performed to address muscle tension and palpation tenderness of the PF musculature internally. She presented with greater tenderness on the left side of the pelvis as well as along the anterior pelvis (e.g., pubic bone).\"    TX: Re-examination/assessment of perineum and internal PF deferred to next follow-up appointment due to time dedicated on subjective report and pt education  Perineal body inspection: within normal limits        Pelvic Floor Muscle Exam:     Muscle Contraction: substitution   Breathing pattern with contraction: holding breath   Pelvic floor muscle relaxation is incomplete.       " "  pelvic floor exam consent given by patient    Pelvic exam completed: vaginally              Precautions: PMH includes: Genital herpes, Advanced maternal age (1st pregnancy), Obesity (affecting pregnancy in 3rd trimester)       3/11 3/18 3/25 4/8 4/15        Manuals             STM/MFR   KS (Posterior pelvis PF)          Fascial Mobilization   KS (Abdominal) KS (Abdominal)         Internal PF assessment/STM/MFR  KS (vaginal)           Pelvic Compression   KS  KS         Abdominal massage    KS         Scar Mobilization    KS                      Neuro Re-Ed             Diaphragmatic Breathing 10x            Pt Edu PF Contraction             Kegel             TA Contraction     KS        TA Contraction - Seated w/ \"shh\"     10x        March Seated on PB     10x ea LE        PPT seated on PB     10x         LAQ Seated on PB     10x ea LE        Chest Press seated on PB     15x        Trunk rotation seated on PB     10x ea direction                     Ther Ex             Happy Baby             Butterfly Stretch             LTR    10x ea dir.         Open Book    5x R s/l                      PF Physical Therapy; POC KS KS KS KS KS        Anatomy as it relates to pt's presentation/symptoms KS KS KS KS KS        Address pt questions PRN KS KS KS KS KS        HEP KS KS  KS KS        Dilators - symptoms management/address pain w/ intimacy  KS - Pt education           Update on pt's symptoms/status/function  KS KS KS         Pt edu scar mobilization    KS         Pt edu abdominal massage    KS                      Ther Activity             PFDI 20     KS        Vulvar Pain Questionnaire     KS        WA: Update on pt's symptoms/status/function     KS                     Gait Training                                       Modalities                                            "

## 2024-04-21 NOTE — PROGRESS NOTES
"Daily Note     Today's date: 2024  Patient name: Toshia Morel  : 1985  MRN: [id number]  Referring provider: Yamil Schmid MD  Dx: No diagnosis found.               Subjective: ***      Objective: See treatment diary below      Assessment: The pt participated in a skilled physical therapy session that focused on manual intervention, neuromuscular re-education, and pt education.           She tolerated treatment well. The pt would benefit from continued PT to address impairments in order to improve her quality of life and bladder/bowel function.       Plan: Continue per plan of care.      Precautions: PMH includes: Genital herpes, Advanced maternal age (1st pregnancy), Obesity (affecting pregnancy in 3rd trimester)       3/11 3/18 3/25 4/8 4/15        Manuals             STM/MFR   KS (Posterior pelvis PF)          Fascial Mobilization   KS (Abdominal) KS (Abdominal)         Internal PF assessment/STM/MFR  KS (vaginal)           Pelvic Compression   KS  KS         Abdominal massage    KS         Scar Mobilization    KS                      Neuro Re-Ed             Diaphragmatic Breathing 10x            Pt Edu PF Contraction             Kegel             TA Contraction     KS        TA Contraction - Seated w/ \"shh\"     10x        March Seated on PB     10x ea LE        PPT seated on PB     10x         LAQ Seated on PB     10x ea LE        Chest Press seated on PB     15x        Trunk rotation seated on PB     10x ea direction                     Ther Ex             Happy Baby             Butterfly Stretch             LTR    10x ea dir.         Open Book    5x R s/l                      PF Physical Therapy; POC KS KS KS KS KS        Anatomy as it relates to pt's presentation/symptoms KS KS KS KS KS        Address pt questions PRN KS KS KS KS KS        HEP KS KS  KS KS        Dilators - symptoms management/address pain w/ intimacy  KS - Pt education           Update on pt's symptoms/status/function  KS " KS KS         Pt edu scar mobilization    KS         Pt edu abdominal massage    KS                      Ther Activity             PFDI 20     KS        Vulvar Pain Questionnaire     KS        ND: Update on pt's symptoms/status/function     KS                       Gait Training                                       Modalities

## 2024-04-22 ENCOUNTER — APPOINTMENT (OUTPATIENT)
Dept: PHYSICAL THERAPY | Facility: CLINIC | Age: 39
End: 2024-04-22
Payer: COMMERCIAL

## 2024-04-24 ENCOUNTER — OFFICE VISIT (OUTPATIENT)
Dept: FAMILY MEDICINE CLINIC | Facility: CLINIC | Age: 39
End: 2024-04-24
Payer: COMMERCIAL

## 2024-04-24 VITALS
DIASTOLIC BLOOD PRESSURE: 78 MMHG | RESPIRATION RATE: 16 BRPM | BODY MASS INDEX: 33.17 KG/M2 | TEMPERATURE: 97.8 F | WEIGHT: 187.2 LBS | OXYGEN SATURATION: 99 % | HEIGHT: 63 IN | SYSTOLIC BLOOD PRESSURE: 120 MMHG | HEART RATE: 74 BPM

## 2024-04-24 DIAGNOSIS — Z12.31 SCREENING MAMMOGRAM FOR BREAST CANCER: ICD-10-CM

## 2024-04-24 DIAGNOSIS — R10.11 RUQ PAIN: ICD-10-CM

## 2024-04-24 DIAGNOSIS — Z00.01 ENCOUNTER FOR WELL ADULT EXAM WITH ABNORMAL FINDINGS: Primary | ICD-10-CM

## 2024-04-24 DIAGNOSIS — R07.81 RIB PAIN ON RIGHT SIDE: ICD-10-CM

## 2024-04-24 PROBLEM — Z84.89 FAMILY HISTORY OF NEOPLASM OF BREAST: Status: ACTIVE | Noted: 2020-10-23

## 2024-04-24 PROBLEM — Z87.19 HISTORY OF IBS: Status: ACTIVE | Noted: 2024-04-24

## 2024-04-24 PROBLEM — N80.9 ENDOMETRIOSIS: Status: ACTIVE | Noted: 2022-05-13

## 2024-04-24 PROCEDURE — 99386 PREV VISIT NEW AGE 40-64: CPT | Performed by: FAMILY MEDICINE

## 2024-04-24 RX ORDER — VALACYCLOVIR HYDROCHLORIDE 500 MG/1
500 TABLET, FILM COATED ORAL 2 TIMES DAILY
COMMUNITY

## 2024-04-24 NOTE — PROGRESS NOTES
Assessment/Plan:     Diagnoses and all orders for this visit:    Encounter for well adult exam with abnormal findings    RUQ pain  -     CBC; Future  -     Comprehensive metabolic panel; Future  -     Lipase; Future  -     UA (URINE) with reflex to Scope; Future  -     Sedimentation rate, automated; Future  -     C-reactive protein; Future  -     Celiac Disease Comprehensive Panel; Future  -     US abdomen complete; Future  -     CBC  -     Comprehensive metabolic panel  -     Lipase  -     UA (URINE) with reflex to Scope  -     Sedimentation rate, automated  -     C-reactive protein    Rib pain on right side  -     XR ribs right w pa chest min 3 views; Future    Screening mammogram for breast cancer  -     Mammo screening bilateral w 3d & cad; Future    Other orders  -     valACYclovir (VALTREX) 500 mg tablet; Take 500 mg by mouth 2 (two) times a day          Labs ordered  Ultrasound of right upper quadrant ordered  Mammogram ordered  X-ray of ribs and chest ordered  May need CT scan  Etiology is unclear I suspect it might actually be endometriosis which she has a history of  Since it got better during her pregnancy and she had no symptoms and now that she is no longer pregnant she is developing symptoms again  Will follow-up after workup  Preventative maintenance expiratory guidance given    Subjective:     Chief Complaint   Patient presents with    [facility]     New patient, right flank pain for 2 weeks        Patient ID: Toshia Morel is a 38 y.o. female.    39 y/o F who presents for annual physical. She c/o RUQ pain radiating to her back, currently rated a 5/10 in severity, which started 2 weeks ago and is becoming more constant. She doesn't notice any change in discomfort with eating, but pain worsens with changing position. Notes nausea. Denies vomiting.         The following portions of the patient's history were reviewed and updated as appropriate: allergies, current medications, past family  "history, past medical history, past social history, past surgical history and problem list.    Review of Systems   Constitutional: Negative.    HENT: Negative.     Eyes: Negative.    Respiratory: Negative.     Cardiovascular: Negative.    Gastrointestinal: Negative.    Endocrine: Negative.    Genitourinary: Negative.    Musculoskeletal: Negative.    Skin: Negative.    Allergic/Immunologic: Negative.    Neurological: Negative.    Hematological: Negative.    Psychiatric/Behavioral: Negative.     All other systems reviewed and are negative.        Objective:    Vitals:    04/24/24 1348   BP: 120/78   BP Location: Left arm   Patient Position: Sitting   Cuff Size: Large   Pulse: 74   Resp: 16   Temp: 97.8 °F (36.6 °C)   TempSrc: Tympanic   SpO2: 99%   Weight: 84.9 kg (187 lb 3.2 oz)   Height: 5' 2.5\" (1.588 m)          Physical Exam  Vitals and nursing note reviewed.   Constitutional:       Appearance: Normal appearance. She is well-developed.   HENT:      Head: Normocephalic and atraumatic.      Right Ear: External ear normal.      Left Ear: External ear normal.   Eyes:      Conjunctiva/sclera: Conjunctivae normal.      Pupils: Pupils are equal, round, and reactive to light.   Cardiovascular:      Rate and Rhythm: Normal rate and regular rhythm.      Heart sounds: Normal heart sounds.   Pulmonary:      Effort: Pulmonary effort is normal.      Breath sounds: Normal breath sounds.   Abdominal:      General: Bowel sounds are normal.      Palpations: Abdomen is soft.   Musculoskeletal:         General: Normal range of motion.      Cervical back: Normal range of motion.   Skin:     General: Skin is warm and dry.   Neurological:      General: No focal deficit present.      Mental Status: She is alert and oriented to person, place, and time.      Deep Tendon Reflexes: Reflexes are normal and symmetric.   Psychiatric:         Behavior: Behavior normal.         Thought Content: Thought content normal.         Judgment: Judgment " normal.

## 2024-04-28 NOTE — PROGRESS NOTES
"Daily Note     Today's date: 2024  Patient name: Toshia Morel  : 1985  MRN: [id number]  Referring provider: Yamil Schmid MD  Dx:   Encounter Diagnosis     ICD-10-CM    1. Pelvic floor dysfunction in female  M62.89       2. SANDRA (stress urinary incontinence, female)  N39.3       3. Dyspareunia in female  N94.10       4. Pelvic pain in female  R10.2           Start Time: 1118  Stop Time: 1203  Total time in clinic (min): 45 minutes    Subjective: Pt reports that she had a cold this past week, which was why she had to cancel her previous appointment. -- \"Urinary symptoms have been great. I have not had any accidents at all.\" Pt notes that she went for her first long walk in a while. -- No bowel accidents the past 2 weeks. Pt reports that she has an appointment with a GI physician tomorrow and is scheduled to get an abdominal US next week. -- Pt reports occasional feeling/sensation and spots of numbness along her  and appendectomy incisions. -- Pt notes that she feels \"a little nervous\" that her  will be returning to work in 2 weeks. She explains that she will be left to manage her infant and 2 dogs without assistance. -- Pt reports pain on the right side of trunk between the ribs and pelvis that is constant, but can get worse with movement and/or localized pressure. She denies an increase right-sided pain after eating.      Objective: See treatment diary below      Assessment: The pt participated in a skilled physical therapy session that focused on manual intervention and pt education. Scar mobilization was performed to address tissue restrictions. Fascial mobilization also provided along the right side of the trunk to address the pt's symptoms of chronic pain. She presented with intermittent tenderness with palpation and light pressure. Symptoms improved as noted by the pt's subjective report and improved tolerance with manual intervention. She tolerated treatment well. The pt " "would benefit from continued PT to address impairments in order to improve her quality of life and bladder/bowel function.       Plan: Continue per plan of care.      Precautions: PMH includes: Genital herpes, Advanced maternal age (1st pregnancy), Obesity (affecting pregnancy in 3rd trimester)       3/11 3/18 3/25 4/8 4/15 4/29       Manuals             STM/MFR   KS (Posterior pelvis PF)          Fascial Mobilization   KS (Abdominal) KS (Abdominal)  KS (abdomen; R thoracic)       Internal PF assessment/STM/MFR  KS (vaginal)           Pelvic Compression   KS  KS         Abdominal massage    KS         Scar Mobilization    KS  KS                    Neuro Re-Ed             Diaphragmatic Breathing 10x            Pt Edu PF Contraction             Kegel             TA Contraction     KS        TA Contraction - Seated w/ \"shh\"     10x        March Seated on PB     10x ea LE        PPT seated on PB     10x         LAQ Seated on PB     10x ea LE        Chest Press seated on PB     15x        Trunk rotation seated on PB     10x ea direction                     Ther Ex             Happy Baby             Butterfly Stretch             LTR    10x ea dir.         Open Book    5x R s/l                      PF Physical Therapy; POC KS KS KS KS KS KS       Anatomy as it relates to pt's presentation/symptoms KS KS KS KS KS KS       Address pt questions PRN KS KS KS KS KS KS       HEP KS KS  KS KS KS       Dilators - symptoms management/address pain w/ intimacy  KS - Pt education           Update on pt's symptoms/status/function  KS KS KS  KS       Pt edu scar mobilization    KS  KS       Pt edu abdominal massage    KS                      Ther Activity             PFDI 20     KS        Vulvar Pain Questionnaire     KS        RI: Update on pt's symptoms/status/function     KS                       Gait Training                                       Modalities                                              "

## 2024-04-29 ENCOUNTER — OFFICE VISIT (OUTPATIENT)
Dept: PHYSICAL THERAPY | Facility: CLINIC | Age: 39
End: 2024-04-29
Payer: COMMERCIAL

## 2024-04-29 DIAGNOSIS — N39.3 SUI (STRESS URINARY INCONTINENCE, FEMALE): ICD-10-CM

## 2024-04-29 DIAGNOSIS — M62.89 PELVIC FLOOR DYSFUNCTION IN FEMALE: Primary | ICD-10-CM

## 2024-04-29 DIAGNOSIS — N94.10 DYSPAREUNIA IN FEMALE: ICD-10-CM

## 2024-04-29 DIAGNOSIS — R10.2 PELVIC PAIN IN FEMALE: ICD-10-CM

## 2024-04-29 PROCEDURE — 97110 THERAPEUTIC EXERCISES: CPT

## 2024-04-29 PROCEDURE — 97140 MANUAL THERAPY 1/> REGIONS: CPT

## 2024-04-30 ENCOUNTER — TELEPHONE (OUTPATIENT)
Age: 39
End: 2024-04-30

## 2024-04-30 ENCOUNTER — OFFICE VISIT (OUTPATIENT)
Age: 39
End: 2024-04-30
Payer: COMMERCIAL

## 2024-04-30 VITALS
SYSTOLIC BLOOD PRESSURE: 112 MMHG | HEIGHT: 63 IN | BODY MASS INDEX: 32.92 KG/M2 | DIASTOLIC BLOOD PRESSURE: 78 MMHG | WEIGHT: 185.8 LBS

## 2024-04-30 DIAGNOSIS — R19.7 DIARRHEA, UNSPECIFIED TYPE: ICD-10-CM

## 2024-04-30 DIAGNOSIS — K58.0 IRRITABLE BOWEL SYNDROME WITH DIARRHEA: ICD-10-CM

## 2024-04-30 DIAGNOSIS — R10.84 GENERALIZED ABDOMINAL PAIN: Primary | ICD-10-CM

## 2024-04-30 PROCEDURE — 99204 OFFICE O/P NEW MOD 45 MIN: CPT | Performed by: INTERNAL MEDICINE

## 2024-04-30 NOTE — TELEPHONE ENCOUNTER
Scheduled date of combo (as of today): 6/6/24  Physician performing combo: DEMIAN  Location of combo: [facility]  Bowel prep reviewed with patient: Clenpiq  Instructions reviewed with patient by: NOAH  Clearances: N

## 2024-04-30 NOTE — PROGRESS NOTES
[facility] - Outpatient Consultation  Toshia Morel 38 y.o. female MRN: [id number]  Encounter: [id number]    ASSESSMENT AND PLAN:    1. Generalized abdominal pain  -     Colonoscopy; Future; Expected date: 04/30/2024  -     EGD; Future; Expected date: 04/30/2024    2. Irritable bowel syndrome with diarrhea    3. Diarrhea, unspecified type  -     Colonoscopy; Future; Expected date: 04/30/2024  -     EGD; Future; Expected date: 04/30/2024  -     Calprotectin,Fecal; Future  -     CBC and differential; Future  -     Celiac Disease Panel; Future  -     Comprehensive metabolic panel; Future  -     Giardia antigen; Future  -     Stool Enteric Bacterial Panel by PCR; Future  -     TSH, 3rd generation; Future  -     Clostridium difficile toxin by PCR with EIA; Future  -     C-reactive protein; Future  -     Calprotectin,Fecal  -     CBC and differential  -     Celiac Disease Panel  -     Comprehensive metabolic panel  -     Giardia antigen  -     Stool Enteric Bacterial Panel by PCR  -     TSH, 3rd generation  -     Clostridium difficile toxin by PCR with EIA  -     C-reactive protein      Assessment & Plan  1. Abdominal pain and diarrhea.  Patient with significant symptoms of abdominal pain and diarrhea.  Including nocturnal diarrhea.  Differential includes inflammatory bowel disease, infections, endocrinopathies, parasitic infections. An endoscopy and colonoscopy will be scheduled for the patient. The patient has been informed about the procedure, including the associated risks.      ---    Chief Complaint   Patient presents with    Diarrhea     Pt has been experiencing significant diarrhea and abdominal pain. Symptoms have increased over the last month. Pt notes she is 4 months post-partum.       HPI:   Toshia Morel is a 38 y.o. female presenting to [facility] care.   History of Present Illness  The patient presents for evaluation of abdominal pain and diarrhea. She has  undergone multiple tests including CT scans, ultrasounds, HIDA scans, and gallbladder testing, all of which have been negative for biliary disease. Also, gallbladder testing, negative for any inflammation, mild or infections. She underwent EGD and colonoscopy in her 20s, which were unremarkable. She was started on a trial of nortriptyline at nighttime.    The patient is currently 4 months postpartum and has been experiencing severe abdominal pain and excessive diarrhea for the past month, to the extent that it has become debilitating. She has experienced 5 episodes of diarrhea today. She denies any presence of blood or mucus in her stool, and her stools are predominantly watery. Her sleep is disrupted due to her child's sleep, and she occasionally experiences urgency to use the restroom. She denies any changes in her medication regimen or antibiotics, and denies any recent travel or sick contacts. She has a history of gastrointestinal issues, but the severity of these symptoms has never been this severe. Her symptoms can occur throughout the day, even if she does not eat, and she is afraid to eat. She denies any fevers, but reports feeling cold. She experiences severe cramping and tenderness on the right side of her abdomen, which does not radiate to her shoulder. She occasionally experiences severe pain, cramping, and tenderness around her umbilicus. Her symptoms sometimes occur during and sometimes immediately after, and she experiences red blotches and headaches. She occasionally experiences severe nausea and abdominal pain. She denies any dysphagia. She has a history of GERD and occasionally experiences regurgitation if she has a severe episode of reflux. She occasionally experiences constipation for 1 to 2 days. She takes medication for herpes when she is stressed. She does not take Motrin, Aleve, or Advil on a regular basis. She had an emergency appendectomy a long time ago, an emergency cystectomy, and a   section. She had endoscopies and colonoscopy in the past, and was told she had mild forms of IBD.. She was informed that she had mild IBD and was advised to manage her symptoms with diet. She is still breastfeeding, and she noticed an exacerbation of her GI issues as soon as her menstrual cycle returned. She is seeing physical therapy for pelvic floor, and she underwent fascial work yesterday, which improved her symptoms.   She was adopted, so she does not know her family history. She met her birth parents before they passed away. Her father had heart disease. Her mother had GI issues.    Answers submitted by the patient for this visit:  Abdominal Pain Questionnaire (Submitted on 2024)  Chief Complaint: Abdominal pain  Chronicity: recurrent  Onset: 1 to 4 weeks ago  Onset quality: sudden  Frequency: constantly  Episode duration: 24 Hours  Progression since onset: gradually worsening  Pain location: RLQ, RUQ, epigastric region, periumbilical region  Pain - numeric: 9/10  Pain quality: aching, cramping, a sensation of fullness, sharp  Radiates to: RLQ, RUQ, periumbilical region, back  anorexia: No  arthralgias: Yes  belching: Yes  constipation: Yes  diarrhea: Yes  dysuria: No  fever: No  flatus: Yes  frequency: No  headaches: Yes  hematochezia: No  hematuria: No  melena: No  myalgias: No  nausea: Yes  weight loss: No  vomiting: No  Aggravated by: bowel movement, certain positions, coughing, eating, movement  Relieved by: being still, passing flatus  Diagnostic workup: ultrasound    Historical Information   Past Medical History:   Diagnosis Date    Asthma     Endometriosis     GERD (gastroesophageal reflux disease)     Herpes     IBS (irritable bowel syndrome)     Lactose intolerance      Past Surgical History:   Procedure Laterality Date    APPENDECTOMY      CYSTECTOMY Right     right ovary    NC  DELIVERY ONLY N/A 2023    Procedure:  SECTION ();  Surgeon: Yamil  "MD Binu;  Location: [facility];  Service: Obstetrics     Social History     Substance and Sexual Activity   Alcohol Use Not Currently    Comment: rare     Social History     Substance and Sexual Activity   Drug Use Never     Social History     Tobacco Use   Smoking Status Never    Passive exposure: Never   Smokeless Tobacco Never     Family History   Adopted: Yes   Problem Relation Age of Onset    Breast cancer Mother     Heart disease Father     Colon cancer Neg Hx        Meds/Allergies     Current Outpatient Medications:     valACYclovir (VALTREX) 500 mg tablet    APNO compound (mupirocin ointment 2%-betamethasone ointment 0.1%-miconazole powder 2%)    clobetasol (TEMOVATE) 0.05 % ointment    famotidine (PEPCID) 20 mg tablet  Allergies   Allergen Reactions    Amoxicillin Anaphylaxis, Hives, Itching, GI Intolerance, Palpitations, Rash and Shortness Of Breath    Dicyclomine Anxiety       PHYSICAL EXAM:    Blood pressure 112/78, height 5' 2.5\" (1.588 m), weight 84.3 kg (185 lb 12.8 oz), last menstrual period 03/09/2024, currently breastfeeding. Body mass index is 33.44 kg/m².  Physical Exam      General Appearance: No apparent distress, cooperative, alert.  Eyes: Anicteric.  Gastrointestinal: Soft, generalized tenderness of the abdomen primarily in the right lower quadrant, non-distended; normal bowel sounds; no masses, no organomegaly.    Rectal: Deferred.  Musculoskeletal: No edema.  Skin: No jaundice.     OTHER LAB RESULTS:   Lab Results   Component Value Date    WBC 16.25 (H) 12/20/2023    WBC 10.46 (H) 12/17/2023    WBC 11.8 (H) 09/28/2023    HGB 12.2 12/20/2023    HGB 13.3 12/17/2023    HGB 12.0 09/28/2023    MCV 88 12/20/2023     12/20/2023     12/17/2023     09/28/2023     Lab Results   Component Value Date    K 4.0 09/06/2022     08/23/2022    CO2 28.6 09/06/2022    BUN 19 (H) 09/06/2022    CREATININE 1.25 (H) 09/06/2022    CALCIUM 8.9 09/06/2022    AST 20 07/10/2023    AST 12 (L) " "09/06/2022    AST 14 (L) 08/23/2022    ALT 17 07/10/2023    ALT 13 (L) 09/06/2022    ALT 11 (L) 08/23/2022    ALKPHOS 63 09/06/2022    ALKPHOS 40 08/23/2022    EGFR 47.71 09/06/2022     No results found for: \"IRON\", \"TIBC\", \"FERRITIN\"  Lab Results   Component Value Date    LIPASE 16 08/23/2022       OTHER RADIOLOGY RESULTS:   No results found.    I have spent 46 minutes today on the date of visit which was spent on one or more of the following: obtaining and reviewing separately obtained history, performing a medically appropriate examination and evaluation, counseling and educating the patient, ordering medications, tests, and procedures, documenting clinical information in the electronic or other health record, and care coordination.  "

## 2024-05-09 ENCOUNTER — OFFICE VISIT (OUTPATIENT)
Dept: PHYSICAL THERAPY | Facility: CLINIC | Age: 39
End: 2024-05-09
Payer: COMMERCIAL

## 2024-05-09 DIAGNOSIS — R10.2 PELVIC PAIN IN FEMALE: ICD-10-CM

## 2024-05-09 DIAGNOSIS — M62.89 PELVIC FLOOR DYSFUNCTION IN FEMALE: Primary | ICD-10-CM

## 2024-05-09 DIAGNOSIS — N94.10 DYSPAREUNIA IN FEMALE: ICD-10-CM

## 2024-05-09 DIAGNOSIS — N39.3 SUI (STRESS URINARY INCONTINENCE, FEMALE): ICD-10-CM

## 2024-05-09 PROCEDURE — 97140 MANUAL THERAPY 1/> REGIONS: CPT

## 2024-05-09 PROCEDURE — 97112 NEUROMUSCULAR REEDUCATION: CPT

## 2024-05-09 PROCEDURE — 97110 THERAPEUTIC EXERCISES: CPT

## 2024-05-09 NOTE — PROGRESS NOTES
"Daily Note     Today's date: 2024  Patient name: Toshia Morel  : 1985  MRN: [id number]  Referring provider: Yamil Schmid MD  Dx:   Encounter Diagnosis     ICD-10-CM    1. Pelvic floor dysfunction in female  M62.89       2. SANDRA (stress urinary incontinence, female)  N39.3       3. Dyspareunia in female  N94.10       4. Pelvic pain in female  R10.2           Start Time: 1018  Stop Time: 1100  Total time in clinic (min): 42 minutes    Subjective: Pt reports little urinary leakage occasionally with sneezing/coughing and, at times, seated at rest. Some close calls with bowel urgency/near incontinence, but was able to manage her symptoms. Pt reached out to GI physician regarding symptoms. She will have an US as well as an endoscopy/colonoscopy. -- Pt reports that exercises have been going \"really well.\" -- \"Almost no pain\" on right side of abdomen by rib cage. \"It is a noticeable difference since last time.\" She notes that she will feel the increase in symptoms with using the infant carrier and with direct pressure the area. Pt notes that her son like to lean to her right side and his pressure/weight can provoke pain.      Objective: See treatment diary below      Assessment: The pt participated in a skilled physical therapy session that focused on manual intervention, neuromuscular re-education, and therapeutic exercise. Considering improvements in RUQ symptoms s/p her previous treatment session, manual intervention was provided to further address the symptoms. She presented with localized tenderness in the RUQ along the diaphragm. MFR was provided to address localized muscle tension and fascial mobilization was provided to address tissue restrictions along the R thoracic and R rib cage. Rib expansion interventions were introduced to actively stretch muscles and soft tissue as well as to increase rib mobility for breathing dynamics/oxygenation. Gluteal sets were added to aid in bowel " "function/continence. NR program was progressed to increase neuromuscular control/strength/endurance of PF muscles necessary for urinary continence. She tolerated treatment well. The pt would benefit from continued PT to address impairments in order to improve her quality of life and bladder/bowel function.        Plan: Continue per plan of care.      Precautions: PMH includes: Genital herpes, Advanced maternal age (1st pregnancy), Obesity (affecting pregnancy in 3rd trimester)       3/11 3/18 3/25 4/8 4/15 4/29 5/9      Manuals             STM/MFR   KS (Posterior pelvis PF)    KS (diaphragm)      Fascial Mobilization   KS (Abdominal) KS (Abdominal)  KS (abdomen; R thoracic) KS (abdomen; R thoracic)      Internal PF assessment/STM/MFR  KS (vaginal)           Pelvic Compression   KS  KS   KS      Abdominal massage    KS   KS (R side)      Scar Mobilization    KS  KS                    Neuro Re-Ed             Diaphragmatic Breathing 10x            Pt Edu PF Contraction             Kegel             TA Contraction     KS        TA Contraction - Seated w/ \"shh\"     10x        March Seated on PB     10x ea LE        PPT seated on PB     10x         LAQ Seated on PB     10x ea LE        Chest Press seated on PB     15x        Trunk rotation seated on PB     10x ea direction        Rib expansion - Supine        5x + 10x w/ tactile cue      Kegel/Core + Bent knee fallout       Blue TB 2x10 ea LE                   Ther Ex             Happy Baby             Butterfly Stretch             LTR    10x ea dir.         Open Book    5x R s/l         Side-lying Shld ABD w/ inhale for rib expansion       15x ea dir      Glute Set       10x 5\" hold                                PF Physical Therapy; POC KS KS KS KS KS KS       Anatomy as it relates to pt's presentation/symptoms KS KS KS KS KS KS KS      Address pt questions PRN KS KS KS KS KS KS KS      HEP KS KS  KS KS KS KS      Dilators - symptoms management/address pain w/ intimacy  " KS - Pt education           Update on pt's symptoms/status/function  KS KS KS  KS KS      Pt edu scar mobilization    KS  KS       Pt edu abdominal massage    KS                      Ther Activity             PFDI 20     KS        Vulvar Pain Questionnaire     KS        VT: Update on pt's symptoms/status/function     KS                       Gait Training                                       Modalities

## 2024-05-10 LAB
ALBUMIN SERPL-MCNC: 4.5 G/DL (ref 3.9–4.9)
ALBUMIN/GLOB SERPL: 2.1 {RATIO} (ref 1.2–2.2)
ALP SERPL-CCNC: 75 IU/L (ref 44–121)
ALT SERPL-CCNC: 11 IU/L (ref 0–32)
APPEARANCE UR: CLEAR
AST SERPL-CCNC: 14 IU/L (ref 0–40)
BILIRUB SERPL-MCNC: <0.2 MG/DL (ref 0–1.2)
BILIRUB UR QL STRIP: NEGATIVE
BUN SERPL-MCNC: 16 MG/DL (ref 6–24)
BUN/CREAT SERPL: 20 (ref 9–23)
CALCIUM SERPL-MCNC: 9.1 MG/DL (ref 8.7–10.2)
CHLORIDE SERPL-SCNC: 103 MMOL/L (ref 96–106)
CO2 SERPL-SCNC: 22 MMOL/L (ref 20–29)
COLOR UR: YELLOW
CREAT SERPL-MCNC: 0.79 MG/DL (ref 0.57–1)
CRP SERPL-MCNC: 5 MG/L (ref 0–10)
EGFR: 97 ML/MIN/1.73
ERYTHROCYTE [SEDIMENTATION RATE] IN BLOOD BY WESTERGREN METHOD: 6 MM/HR (ref 0–32)
GLOBULIN SER-MCNC: 2.1 G/DL (ref 1.5–4.5)
GLUCOSE SERPL-MCNC: 84 MG/DL (ref 70–99)
GLUCOSE UR QL: NEGATIVE
HGB UR QL STRIP: NEGATIVE
KETONES UR QL STRIP: NEGATIVE
LEUKOCYTE ESTERASE UR QL STRIP: NEGATIVE
LIPASE SERPL-CCNC: 29 U/L (ref 14–72)
MICRO URNS: NORMAL
NITRITE UR QL STRIP: NEGATIVE
PH UR STRIP: 6.5 [PH] (ref 5–7.5)
POTASSIUM SERPL-SCNC: 4.2 MMOL/L (ref 3.5–5.2)
PROT SERPL-MCNC: 6.6 G/DL (ref 6–8.5)
PROT UR QL STRIP: NEGATIVE
SODIUM SERPL-SCNC: 140 MMOL/L (ref 134–144)
SP GR UR: 1.01 (ref 1–1.03)
UROBILINOGEN UR STRIP-ACNC: 0.2 MG/DL (ref 0.2–1)

## 2024-05-14 ENCOUNTER — APPOINTMENT (OUTPATIENT)
Dept: PHYSICAL THERAPY | Facility: CLINIC | Age: 39
End: 2024-05-14
Payer: COMMERCIAL

## 2024-05-14 NOTE — PROGRESS NOTES
"Daily Note     Today's date: 2024  Patient name: Toshia Morel  : 1985  MRN: [id number]  Referring provider: Yamil Schmid MD  Dx: No diagnosis found.               Subjective: ***      Objective: See treatment diary below      Assessment: The pt participated in a skilled physical therapy session that focused on manual intervention, neuromuscular re-education, and therapeutic exercise.          She tolerated treatment well. The pt would benefit from continued PT to address impairments in order to improve her quality of life and bladder/bowel function.       Plan: Continue per plan of care.  Progress note during next visit.      Precautions: PMH includes: Genital herpes, Advanced maternal age (1st pregnancy), Obesity (affecting pregnancy in 3rd trimester)       3/11 3/18 3/25 4/8 4/15 4/29 5/9      Manuals             STM/MFR   KS (Posterior pelvis PF)    KS (diaphragm)      Fascial Mobilization   KS (Abdominal) KS (Abdominal)  KS (abdomen; R thoracic) KS (abdomen; R thoracic)      Internal PF assessment/STM/MFR  KS (vaginal)           Pelvic Compression   KS  KS   KS      Abdominal massage    KS   KS (R side)      Scar Mobilization    KS  KS                    Neuro Re-Ed             Diaphragmatic Breathing 10x            Pt Edu PF Contraction             Kegel             TA Contraction     KS        TA Contraction - Seated w/ \"shh\"     10x        March Seated on PB     10x ea LE        PPT seated on PB     10x         LAQ Seated on PB     10x ea LE        Chest Press seated on PB     15x        Trunk rotation seated on PB     10x ea direction        Rib expansion - Supine        5x + 10x w/ tactile cue      Kegel/Core + Bent knee fallout       Blue TB 2x10 ea LE                   Ther Ex             Happy Baby             Butterfly Stretch             LTR    10x ea dir.         Open Book    5x R s/l         Side-lying Shld ABD w/ inhale for rib expansion       15x ea dir      Glute Set       " "10x 5\" hold                                PF Physical Therapy; POC KS KS KS KS KS KS       Anatomy as it relates to pt's presentation/symptoms KS KS KS KS KS KS KS      Address pt questions PRN KS KS KS KS KS KS KS      HEP KS KS  KS KS KS KS      Dilators - symptoms management/address pain w/ intimacy  KS - Pt education           Update on pt's symptoms/status/function  KS KS KS  KS KS      Pt edu scar mobilization    KS  KS       Pt edu abdominal massage    KS                      Ther Activity             PFDI 20     KS        Vulvar Pain Questionnaire     KS        KS: Update on pt's symptoms/status/function     KS                       Gait Training                                       Modalities                                                  "

## 2024-05-16 ENCOUNTER — HOSPITAL ENCOUNTER (OUTPATIENT)
Dept: ULTRASOUND IMAGING | Facility: CLINIC | Age: 39
Discharge: HOME | End: 2024-05-16
Payer: COMMERCIAL

## 2024-05-16 DIAGNOSIS — R10.11 RUQ PAIN: ICD-10-CM

## 2024-05-16 PROCEDURE — 76700 US EXAM ABDOM COMPLETE: CPT

## 2024-05-20 ENCOUNTER — TELEPHONE (OUTPATIENT)
Dept: GASTROENTEROLOGY | Facility: CLINIC | Age: 39
End: 2024-05-20

## 2024-05-20 NOTE — TELEPHONE ENCOUNTER
Called pt and lvm to call us back in regards to the C-diff lab work and if she still has symptoms of diarrhea.

## 2024-05-22 ENCOUNTER — APPOINTMENT (OUTPATIENT)
Dept: PHYSICAL THERAPY | Facility: CLINIC | Age: 39
End: 2024-05-22
Payer: COMMERCIAL

## 2024-05-22 LAB
ADV 40+41 DNA STL QL NAA+NON-PROBE: NOT DETECTED
ASTRO TYP 1-8 RNA STL QL NAA+NON-PROBE: NOT DETECTED
BASOPHILS # BLD AUTO: 0 X10E3/UL (ref 0–0.2)
BASOPHILS NFR BLD AUTO: 1 %
C CAYETANENSIS DNA STL QL NAA+NON-PROBE: NOT DETECTED
C COLI+JEJ+UPSA DNA STL QL NAA+NON-PROBE: NOT DETECTED
C DIF TOX TCDA+TCDB STL QL NAA+NON-PROBE: NOT DETECTED
C DIFF TOX GENS STL QL NAA+PROBE: NEGATIVE
CALPROTECTIN STL-MCNT: 7 UG/G (ref 0–120)
CRYPTOSP DNA STL QL NAA+NON-PROBE: NOT DETECTED
E COLI O157 DNA STL QL NAA+NON-PROBE: NORMAL
E HISTOLYT DNA STL QL NAA+NON-PROBE: NOT DETECTED
EAEC PAA PLAS AGGR+AATA ST NAA+NON-PRB: NOT DETECTED
EC STX1+STX2 GENES STL QL NAA+NON-PROBE: NOT DETECTED
ENDOMYSIUM IGA SER QL: NEGATIVE
EOSINOPHIL # BLD AUTO: 0.1 X10E3/UL (ref 0–0.4)
EOSINOPHIL NFR BLD AUTO: 1 %
EPEC EAE GENE STL QL NAA+NON-PROBE: NOT DETECTED
ERYTHROCYTE [DISTWIDTH] IN BLOOD BY AUTOMATED COUNT: 12.4 % (ref 11.7–15.4)
ETEC LTA+ST1A+ST1B TOX ST NAA+NON-PROBE: NOT DETECTED
G LAMBLIA AG STL QL IA: NEGATIVE
G LAMBLIA DNA STL QL NAA+NON-PROBE: NOT DETECTED
HCT VFR BLD AUTO: 43.3 % (ref 34–46.6)
HGB BLD-MCNC: 15 G/DL (ref 11.1–15.9)
IGA SERPL-MCNC: 143 MG/DL (ref 87–352)
IMM GRANULOCYTES # BLD: 0 X10E3/UL (ref 0–0.1)
IMM GRANULOCYTES NFR BLD: 0 %
LYMPHOCYTES # BLD AUTO: 1.8 X10E3/UL (ref 0.7–3.1)
LYMPHOCYTES NFR BLD AUTO: 24 %
MCH RBC QN AUTO: 29.7 PG (ref 26.6–33)
MCHC RBC AUTO-ENTMCNC: 34.6 G/DL (ref 31.5–35.7)
MCV RBC AUTO: 86 FL (ref 79–97)
MONOCYTES # BLD AUTO: 0.6 X10E3/UL (ref 0.1–0.9)
MONOCYTES NFR BLD AUTO: 8 %
NEUTROPHILS # BLD AUTO: 4.9 X10E3/UL (ref 1.4–7)
NEUTROPHILS NFR BLD AUTO: 66 %
NOROVIRUS GI+II RNA STL QL NAA+NON-PROBE: NOT DETECTED
P SHIGELLOIDES DNA STL QL NAA+NON-PROBE: NOT DETECTED
PLATELET # BLD AUTO: 231 X10E3/UL (ref 150–450)
RBC # BLD AUTO: 5.05 X10E6/UL (ref 3.77–5.28)
RVA RNA STL QL NAA+NON-PROBE: NOT DETECTED
S ENT+BONG DNA STL QL NAA+NON-PROBE: NOT DETECTED
SAPO I+II+IV+V RNA STL QL NAA+NON-PROBE: NOT DETECTED
SHIGELLA SP+EIEC IPAH ST NAA+NON-PROBE: NOT DETECTED
TSH SERPL DL<=0.005 MIU/L-ACNC: 0.51 UIU/ML (ref 0.45–4.5)
TTG IGA SER-ACNC: <2 U/ML (ref 0–3)
V CHOL+PARA+VUL DNA STL QL NAA+NON-PROBE: NOT DETECTED
V CHOLERAE DNA STL QL NAA+NON-PROBE: NOT DETECTED
WBC # BLD AUTO: 7.4 X10E3/UL (ref 3.4–10.8)
Y ENTEROCOL DNA STL QL NAA+NON-PROBE: NOT DETECTED

## 2024-05-23 ENCOUNTER — ANESTHESIA EVENT (OUTPATIENT)
Dept: ANESTHESIOLOGY | Facility: AMBULATORY SURGERY CENTER | Age: 39
End: 2024-05-23

## 2024-05-23 ENCOUNTER — ANESTHESIA (OUTPATIENT)
Dept: ANESTHESIOLOGY | Facility: AMBULATORY SURGERY CENTER | Age: 39
End: 2024-05-23

## 2024-05-29 ENCOUNTER — TELEPHONE (OUTPATIENT)
Age: 39
End: 2024-05-29

## 2024-05-29 NOTE — TELEPHONE ENCOUNTER
Patients GI provider:  Dr. Rosario    Number to return call: [phone]    Reason for call: Cordelia from [facility] calling in regards to stool testing that was completed on 5/15/24. States that CPT code 67109, Infectious Agent Antigen Detection, is not covered under patients insurance with the diagnosis code of diarrhea. Is asking if provider can update the diagnosis code to be resubmitted. Cordelia was unable to provide exactly which testing it is in regards to as CPT code does not match lab name in chart.     Scheduled procedure/appointment date if applicable: N/A

## 2024-05-30 DIAGNOSIS — R19.4 CHANGE IN BOWEL HABITS: Primary | ICD-10-CM

## 2024-05-30 NOTE — TELEPHONE ENCOUNTER
Hello I resubmitted all the stool test under new diagnosis code.  Should be noted in the patient's lab section.  Hopefully this will be covered.  Would you be able to send this over to [facility]?

## 2024-06-04 DIAGNOSIS — R10.84 GENERALIZED ABDOMINAL PAIN: ICD-10-CM

## 2024-06-04 DIAGNOSIS — R19.4 CHANGE IN BOWEL HABITS: ICD-10-CM

## 2024-06-04 DIAGNOSIS — R19.7 DIARRHEA, UNSPECIFIED TYPE: ICD-10-CM

## 2024-06-04 DIAGNOSIS — Z87.19 HISTORY OF IBS: Primary | ICD-10-CM

## 2024-06-04 RX ORDER — SODIUM PICOSULFATE, MAGNESIUM OXIDE, AND ANHYDROUS CITRIC ACID 12; 3.5; 1 G/175ML; G/175ML; MG/175ML
LIQUID ORAL
Qty: 350 ML | Refills: 0 | Status: SHIPPED | OUTPATIENT
Start: 2024-06-04 | End: 2024-06-06 | Stop reason: HOSPADM

## 2024-06-06 ENCOUNTER — HOSPITAL ENCOUNTER (OUTPATIENT)
Dept: GASTROENTEROLOGY | Facility: AMBULATORY SURGERY CENTER | Age: 39
Discharge: HOME | End: 2024-06-06
Attending: INTERNAL MEDICINE
Payer: COMMERCIAL

## 2024-06-06 ENCOUNTER — ANESTHESIA (OUTPATIENT)
Dept: GASTROENTEROLOGY | Facility: AMBULATORY SURGERY CENTER | Age: 39
End: 2024-06-06

## 2024-06-06 ENCOUNTER — ANESTHESIA EVENT (OUTPATIENT)
Dept: GASTROENTEROLOGY | Facility: AMBULATORY SURGERY CENTER | Age: 39
End: 2024-06-06

## 2024-06-06 VITALS
HEIGHT: 61 IN | OXYGEN SATURATION: 100 % | RESPIRATION RATE: 20 BRPM | WEIGHT: 180 LBS | HEART RATE: 71 BPM | SYSTOLIC BLOOD PRESSURE: 114 MMHG | TEMPERATURE: 97.9 F | BODY MASS INDEX: 33.99 KG/M2 | DIASTOLIC BLOOD PRESSURE: 66 MMHG

## 2024-06-06 DIAGNOSIS — R19.7 DIARRHEA, UNSPECIFIED TYPE: ICD-10-CM

## 2024-06-06 DIAGNOSIS — R10.84 GENERALIZED ABDOMINAL PAIN: ICD-10-CM

## 2024-06-06 LAB
EXT PREGNANCY TEST URINE: NEGATIVE
EXT. CONTROL: NORMAL

## 2024-06-06 PROCEDURE — 45378 DIAGNOSTIC COLONOSCOPY: CPT | Performed by: INTERNAL MEDICINE

## 2024-06-06 PROCEDURE — 88305 TISSUE EXAM BY PATHOLOGIST: CPT | Performed by: SPECIALIST

## 2024-06-06 PROCEDURE — 45380 COLONOSCOPY AND BIOPSY: CPT | Performed by: INTERNAL MEDICINE

## 2024-06-06 PROCEDURE — 43239 EGD BIOPSY SINGLE/MULTIPLE: CPT | Performed by: INTERNAL MEDICINE

## 2024-06-06 RX ORDER — SODIUM CHLORIDE, SODIUM LACTATE, POTASSIUM CHLORIDE, CALCIUM CHLORIDE 600; 310; 30; 20 MG/100ML; MG/100ML; MG/100ML; MG/100ML
50 INJECTION, SOLUTION INTRAVENOUS CONTINUOUS
Status: DISCONTINUED | OUTPATIENT
Start: 2024-06-06 | End: 2024-06-10 | Stop reason: HOSPADM

## 2024-06-06 RX ORDER — LOPERAMIDE HYDROCHLORIDE 2 MG/1
2 CAPSULE ORAL 4 TIMES DAILY PRN
Qty: 90 CAPSULE | Refills: 1 | Status: SHIPPED | OUTPATIENT
Start: 2024-06-06

## 2024-06-06 RX ORDER — SODIUM CHLORIDE, SODIUM LACTATE, POTASSIUM CHLORIDE, CALCIUM CHLORIDE 600; 310; 30; 20 MG/100ML; MG/100ML; MG/100ML; MG/100ML
INJECTION, SOLUTION INTRAVENOUS CONTINUOUS PRN
Status: DISCONTINUED | OUTPATIENT
Start: 2024-06-06 | End: 2024-06-06

## 2024-06-06 RX ORDER — PROPOFOL 10 MG/ML
INJECTION, EMULSION INTRAVENOUS AS NEEDED
Status: DISCONTINUED | OUTPATIENT
Start: 2024-06-06 | End: 2024-06-06

## 2024-06-06 RX ORDER — LIDOCAINE HYDROCHLORIDE 20 MG/ML
INJECTION, SOLUTION EPIDURAL; INFILTRATION; INTRACAUDAL; PERINEURAL AS NEEDED
Status: DISCONTINUED | OUTPATIENT
Start: 2024-06-06 | End: 2024-06-06

## 2024-06-06 RX ADMIN — PROPOFOL 50 MG: 10 INJECTION, EMULSION INTRAVENOUS at 13:13

## 2024-06-06 RX ADMIN — PROPOFOL 200 MG: 10 INJECTION, EMULSION INTRAVENOUS at 12:50

## 2024-06-06 RX ADMIN — SODIUM CHLORIDE, SODIUM LACTATE, POTASSIUM CHLORIDE, CALCIUM CHLORIDE: 600; 310; 30; 20 INJECTION, SOLUTION INTRAVENOUS at 12:48

## 2024-06-06 RX ADMIN — SODIUM CHLORIDE, SODIUM LACTATE, POTASSIUM CHLORIDE, CALCIUM CHLORIDE 50 ML/HR: 600; 310; 30; 20 INJECTION, SOLUTION INTRAVENOUS at 12:43

## 2024-06-06 RX ADMIN — PROPOFOL 50 MG: 10 INJECTION, EMULSION INTRAVENOUS at 13:05

## 2024-06-06 RX ADMIN — LIDOCAINE HYDROCHLORIDE 200 MG: 20 INJECTION, SOLUTION EPIDURAL; INFILTRATION; INTRACAUDAL; PERINEURAL at 12:50

## 2024-06-06 RX ADMIN — PROPOFOL 50 MG: 10 INJECTION, EMULSION INTRAVENOUS at 13:09

## 2024-06-06 RX ADMIN — PROPOFOL 100 MG: 10 INJECTION, EMULSION INTRAVENOUS at 12:51

## 2024-06-06 RX ADMIN — PROPOFOL 50 MG: 10 INJECTION, EMULSION INTRAVENOUS at 13:02

## 2024-06-06 NOTE — ANESTHESIA PREPROCEDURE EVALUATION
Procedure:  COLONOSCOPY  EGD    Relevant Problems   GI/HEPATIC   (+) Gastroesophageal reflux during pregnancy in third trimester, antepartum      NEURO/PSYCH   (+) Headache   BMI-37   No back issues  Asthma -Stable  Physical Exam    Airway    Mallampati score: II  TM Distance: >3 FB  Neck ROM: full     Dental   No notable dental hx     Cardiovascular  Cardiovascular exam normal    Pulmonary      Other Findings  post-pubertal.      Anesthesia Plan  ASA Score- 2     Anesthesia Type- IV sedation with anesthesia with ASA Monitors.         Additional Monitors:     Airway Plan:            Plan Factors-Exercise tolerance (METS): >4 METS.    Chart reviewed.   Existing labs reviewed.     Patient is not a current smoker.              Induction-     Postoperative Plan-         Informed Consent- Anesthetic plan and risks discussed with patient.  I personally reviewed this patient with the CRNA. Discussed and agreed on the Anesthesia Plan with the CRNA..    t.

## 2024-06-06 NOTE — QUICK NOTE
Patient awoke and complained of pain on right lower lip. It appears that patient had bit lip. Offered ice, patient refused. Instructed to place ice over it when gets home if it continues to bother patient.

## 2024-06-06 NOTE — ANESTHESIA POSTPROCEDURE EVALUATION
Post-Op Assessment Note    CV Status:  Stable  Pain Score: 0    Pain management: adequate       Mental Status:  Alert and awake   Hydration Status:  Euvolemic   PONV Controlled:  Controlled   Airway Patency:  Patent     Post Op Vitals Reviewed: Yes    No anethesia notable event occurred.    Staff: CRNA               BP   113/65   Temp 97   Pulse 81   Resp 16   SpO2 98

## 2024-06-06 NOTE — H&P
"History and Physical -  [facility]  Toshia Morel 38 y.o. female MRN: [id number]    HPI: Toshia Morel is a 38 y.o. female who presents for EGD and colonoscopy for change in bowel habits, abdominal pain    REVIEW OF SYSTEMS: Per the HPI, and otherwise unremarkable.    Historical Information   Past Medical History:   Diagnosis Date    Asthma     Endometriosis     GERD (gastroesophageal reflux disease)     Herpes     IBS (irritable bowel syndrome)     Lactose intolerance      Past Surgical History:   Procedure Laterality Date    APPENDECTOMY      COLONOSCOPY      CYSTECTOMY Right 2013    right ovary    EGD      NASAL SEPTUM SURGERY      DC  DELIVERY ONLY N/A 2023    Procedure:  SECTION ();  Surgeon: Yamil Schmid MD;  Location: [facility];  Service: Obstetrics     Social History   Social History     Substance and Sexual Activity   Alcohol Use Not Currently    Comment: rare     Social History     Substance and Sexual Activity   Drug Use Never     Social History     Tobacco Use   Smoking Status Never    Passive exposure: Never   Smokeless Tobacco Never     Family History   Adopted: Yes   Problem Relation Age of Onset    Breast cancer Mother     Heart disease Father     Colon cancer Neg Hx        Meds/Allergies       Current Outpatient Medications:     APNO compound (mupirocin ointment 2%-betamethasone ointment 0.1%-miconazole powder 2%)    clobetasol (TEMOVATE) 0.05 % ointment    famotidine (PEPCID) 20 mg tablet    sodium picosulfate, magnesium oxide, citric acid (Clenpiq) oral solution    valACYclovir (VALTREX) 500 mg tablet    Current Facility-Administered Medications:     lactated ringers infusion, 50 mL/hr, Intravenous, Continuous    Allergies   Allergen Reactions    Amoxicillin Anaphylaxis, Hives, Itching, GI Intolerance, Palpitations, Rash and Shortness Of Breath    Dicyclomine Anxiety       Objective     Temp 97.9 °F (36.6 °C) (Temporal)   Ht 5' 1\" (1.549 m) "   Wt 81.6 kg (180 lb)   LMP 05/30/2024 (Exact Date)   BMI 34.01 kg/m²     PHYSICAL EXAM    General Appearance: NAD, cooperative, alert  Eyes: Anicteric  GI:  Soft, non-tender, non-distended; normal bowel sounds; no masses, no organomegaly   Rectal: Deferred until procedure  Musculoskeletal: No edema.  Skin:  No jaundice    ASSESSMENT/PLAN:  This is a 38 y.o. year old female here for EGD and colonoscopy, and she is stable and optimized for her procedure.

## 2024-06-06 NOTE — DISCHARGE INSTRUCTIONS
Upper Endoscopy and Colonoscopy   WHAT YOU NEED TO KNOW:   An upper endoscopy is also called an upper gastrointestinal (GI) endoscopy, or an esophagogastroduodenoscopy (EGD). It is a procedure to examine the inside of your esophagus, stomach, and duodenum (first part of the small intestine) with a scope. You may feel bloated, gassy, or have some abdominal discomfort after your procedure. Your throat may be sore for 24 to 36 hours. You may burp or pass gas from air that is still inside your body.                A colonoscopy is a procedure to examine the inside of your colon (intestine) with a scope. Polyps or tissue growths may have been removed during your colonoscopy. It is normal to feel bloated and to have some abdominal discomfort. You should be passing gas. If you have hemorrhoids or you had polyps removed, you may have a small amount of bleeding.          DISCHARGE INSTRUCTIONS:   Seek care immediately if:   You have sudden, severe abdominal pain.     You have problems swallowing.     You have a large amount of black, sticky bowel movements or blood in your bowel movements.     You have sudden trouble breathing.     You feel weak, lightheaded, or faint or your heart beats faster than normal for you.     Contact your healthcare provider if:   You have a fever and chills.      You have nausea or are vomiting.      Your abdomen is bloated or feels full and hard.     You have abdominal pain.    You have black, sticky bowel movements or blood in your bowel movements.    You have not had a bowel movement for 3 days after your procedure.    You have rash or hives.    You have questions or concerns about your procedure.     Activity:   ·       Do not lift, strain, or run for 24 hours after your procedure.     ·       Rest after your procedure. You have been given medicine to relax you. Do not drive or make important decisions until the day after your procedure. Return to your normal activity as directed.     ·        Relieve gas and discomfort from bloating by lying on your right side with a heating pad on your abdomen. You may need to take short walks to help the gas move out. Eat small meals until bloating is relieved.  Follow up with your healthcare provider as directed: Write down your questions so you remember to ask them during your visits.      If you take a “blood thinner”, please review the specific instructions from your endoscopist about when you should resume it. These can be found in the “Recommendation” and “Your Medication list” sections of this After Visit Summary.

## 2024-06-12 ENCOUNTER — APPOINTMENT (OUTPATIENT)
Dept: PHYSICAL THERAPY | Facility: CLINIC | Age: 39
End: 2024-06-12
Payer: COMMERCIAL

## 2024-06-26 ENCOUNTER — EVALUATION (OUTPATIENT)
Dept: PHYSICAL THERAPY | Facility: CLINIC | Age: 39
End: 2024-06-26
Payer: COMMERCIAL

## 2024-06-26 DIAGNOSIS — M62.89 PELVIC FLOOR DYSFUNCTION IN FEMALE: Primary | ICD-10-CM

## 2024-06-26 DIAGNOSIS — R10.2 PELVIC PAIN IN FEMALE: ICD-10-CM

## 2024-06-26 DIAGNOSIS — N94.10 DYSPAREUNIA IN FEMALE: ICD-10-CM

## 2024-06-26 DIAGNOSIS — N39.3 SUI (STRESS URINARY INCONTINENCE, FEMALE): ICD-10-CM

## 2024-06-26 PROCEDURE — 97110 THERAPEUTIC EXERCISES: CPT

## 2024-06-26 PROCEDURE — 97530 THERAPEUTIC ACTIVITIES: CPT

## 2024-06-26 PROCEDURE — 97140 MANUAL THERAPY 1/> REGIONS: CPT

## 2024-06-26 NOTE — PROGRESS NOTES
PT Re-Evaluation/Update POC    Today's date: 2024  Patient name: Toshia Morel  : 1985  MRN: [id number]  Referring provider: Yamil Schmid MD  Dx:   Encounter Diagnosis     ICD-10-CM    1. Pelvic floor dysfunction in female  M62.89       2. SANDRA (stress urinary incontinence, female)  N39.3       3. Dyspareunia in female  N94.10       4. Pelvic pain in female  R10.2               Start Time: 1505  Stop Time: 1548  Total time in clinic (min): 43 minutes    Assessment  Impairments: abnormal muscle firing, abnormal muscle tone, abnormal or restricted ROM, activity intolerance, impaired physical strength, lacks appropriate home exercise program, pain with function, poor posture  and poor body mechanics  Symptom irritability: moderate    Assessment details: The pt is a 39 year old female who presents to skilled physical therapy services due to a decline in functional status related to pelvic pain and bladder/bowel dysfunction. At this point in her POC, she has received 8 skilled physical therapy sessions. Upon completion of the CT, she presents with improvements right-sided trunk pain and bladder/bowel function, although progress has been limited due to poor adherence with POC/appointments based upon scheduling/personal availability. She continues to present with impairments in pain (low back, anterior pelvis), breathing mechanics, posture, muscle tension/palpation tenderness, urinary continence, bowel function, as well as neuromuscular control/strength/endurance of PF musculature. As a result of these impairments, the pt has difficulty with the following functional activities: urinary urgency, urinary frequency, sexual intimacy (pain), urinary continence, fecal continence, and bowel function (pain, strain, urgency). POC will include manual therapy for flexibility/mobility and symptom modulation, modalities (PRN), TE/TA/NR interventions for functional strength and neuromuscular control, HEP, and pt  education. The pt will continue to benefit from skilled physical therapy services to address impairments in order to improve her quality of life and bladder/bowel function.    Thank you for the referral.    Barriers to therapy: Recent pregnancy; Currently lactating -- PMH includes: genital Herpes, obesity during pregnancy   Understanding of Dx/Px/POC: good     Prognosis: good    Goals  - Pt will have decreased lumbopelvic muscle overactivity to mild-trace to reflect a reduction in spasms in order to improve functional abilities.  - Pt will present with at least an 80% decrease discomfort with pelvic touch for improved tolerance to manual therapy and self-treatment techniques.  - Pt will report at least an 80% reduction in discomfort with either palpation or intimacy in order to improve quality of life as well as to tolerate gynecological examination and/or intimacy.  - Pt will be able to tolerate vaginal penetration with 0-2/10 pain for improved tolerance for gynecological examination and/or intercourse.  - Pt is able to cough, laugh, and sneeze without bladder leakage in order to improve quality of life and decrease need/dependency on panty liners or period underwear.  - Pt reports at least an 80% reduction in urinary incontinence indicating decreased effect of urinary incontinence on function.  - Pt reports at least an 80% reduction in pain and/or straining with bowel movements indicating decreased effect of constipation on function.  - Pt has implemented urge suppression strategies throughout the day and reports average voiding interval is 2-3 hours upon discharge in order to have more functional bladder functioning.  - Decrease nocturia to 1 voids/night for more thorough sleep.     Plan  Patient would benefit from: skilled physical therapy  Referral necessary: Yes    Planned therapy interventions: abdominal trunk stabilization, joint mobilization, manual therapy, body mechanics training, neuromuscular  "re-education, breathing training, patient education, postural training, self care, strengthening, stretching, flexibility, functional ROM exercises, therapeutic activities, therapeutic exercise and home exercise program    Frequency: 1x week  Plan of Care beginning date: 3/11/2024  Plan of Care expiration date: 8/30/2024  Treatment plan discussed with: patient  Plan details: 6/26/2024: Request extension of POC to address remaining impairments -- Pt's POC was on a temporary hold due to scheduling/limited availability (parenting responsibilities; pt's  returned to work)        PT Pelvic Floor Subjective:   History of Present Illness:       PROGRESS REPORT: 6/26/2024  The pt reports that she feels that she is \"catching up most days.\" Pt reports feeling more worn out now than she was a few month ago. She explains that her  is back at work and they do not have as much assistance from family as they did previously. -- \"Me time is non-existent.\" \"No me time.\" Pt explains that she has limited opportunities to have 30 minutes to herself for self-care activities, such as taking a shower. -- Pt reports an increase in back pain at epidural location. \"When I feel good and I feel relaxed and I do my exercises, I feel better.\" -- Intimacy is getting worse. Pt notes decreased libido and difficulty relaxing to enjoy sex. -- Pt reports a decrease in right-side rib/trunk pain.     PROGRESS REPORT: 4/15/2024  Regarding symptoms, \"not great.\" Pt reports fecal incontinence last week one day. Pt reports that she was in the car and unable to manage it. Pt explains that she was in the car for 2 hours and about 10 minutes from home stuck in traffic when she experienced the fecal incontinence. -- Pt reports that she is currently in a state of constipation. Has not has had BM since the event ~3 days ago. Decreased willingness to eat due to negative feelings and memory of fecal incontinence. -- Pt adds that she feels that she has " "less control with urine and bowel control since she had her infant. -- Pt report that she feels that she is done with urinating when she goes and notes a decrease in waking up in the middle of the night to urinate. Improvement with coughing/sneezing. No leakage with coughing/sneezing. -- She reports difficulty with knowing that she needs to go until there is more urgency with urinating. She associates this with decreased sensation after labor/birthing. -- More abdominal pain since pregnancy/labor. \"It just doesn't feel settled.\" --  -- Improved sensation around scar since previous appointment and use of scar mobilization as part of HEP.  -- \"A lot of stress\" this past week. Pt reports that her  will be leaving for a trip (Friday to Monday). Pt notes that this will be the first time that she will be with her baby alone for this long. Pt notes that she does not have additional help.     INITIAL EVALUATION:  -- \"I have like no core strength after what happened.\" (Pregnancy, breast feeding) -- 3 months ago  Date of onset: 2023    Pt reports an onset/increase in low back pain and anterior pelvic pain around the time of labor/birth of her infant. As a result of LBP, the pt has difficulty with lifting and carrying activities (e.g., picking up/carrying her  son). Pt also reports an increase in pelvic pain with examination and sexual intimacy.        Not a recurrent problem     Social Support:     Lives in:  Multiple-level home    Lives with:  Spouse and young children (2 dogs)    Work status: unemployed    Life stress severity: moderate    History of Depression: yes  Diet and Exercise:    Diet:balanced nutrition    Exercise type: walking    Wants return to routine exercise    Not exercising due to: bladder incontinence  OB/ gyn History    Gestational History:     Prior Pregnancy: Yes      Number of prior pregnancies: 1    Number of term pregnancies: 1    Delivery Type:  section      Number of " "caesarian sections: 1    Delivery Complications:  Emergency  section after approximately 50 hours of labor. Pt was induced for labor, but presented with intense contractions and minimal dilation after being induced. -- Pt reports/explains that the experience of labor/birthing and emergency  section was traumatic.    Preciously experienced post-partum depression; Intermittent participation in women's support group      Menstrual History:      Painful periods:  Difficulty managing menstrual pain    Tolerates tampons: yes (\"I know it's there.\" -- Pain with insertion)    Birth control: no contraception (Not currently)  no hormone replacement therapy  Pt's most recent menstruation cycle has been more painful and heavier than previously  Bladder Function:     Voiding Difficulties positive for: urgency and frequent urination      Voiding Difficulties negative for: straining and incomplete emptying (Prior experience; Takes longer to make sure bladder empties)       Voiding Difficulties comments:     Voiding frequency: every 1-2 hours    Urinary leakage: urine leakage (IE: Not every day -- ~4x/wk)    Urinary leakage aggravated by: coughing, sneezing, exercise, post-void dribble and laughing    Urinary leakage not aggravated by: bending, standing up, walking to the bathroom and seeing a toilet    Nocturia (episodes per night): 2    Painful urination: Yes (Occasional -- previously pain for 15-20 minutes after urination; now brief)      Fluid Intake Type:  Water  Incontinence Management:     Pads/Diaper Use:  None    Pads/Diapers Additional Comments: Period underwear occasionally  Bowel Function:     Voiding DIfficulties: urgency, painful defecating and unfinished feeling after defecating      Bowel Function comments:  Regular in morning and without issues (pretty normal for most part); Greater difficulty later in day.     Bowel frequency: daily and every 2 days    Stool softener use: no stool softeners    Uses " "\"squatty potty\": Squatty Potty  Sexual Function:     Sexually Active:  Sexually active and single partner    Pain during intercourse: Yes      Lubrication Use: Yes    pain causes abstinence    Patient wishes to return to having intercourse: currently unable to have intercourse but wants toSexual function: vulvar pain and vaginal pain  Pain:     Current pain ratin    At best pain ratin    At worst pain rating:  10    Location:  Back pain; Epidural spot; Pelvic pain (5/10 at worst); Abdominal cramping    Quality:  Sharp (Tight; sore)    Aggravating factors:  Urination, intercourse, bowel movements, prolonged positions and sit to stand transition  Treatments:     Current treatment: physical therapy    Patient Goals:     Patient goals for therapy:  Decreased interpersonal problems, decreased pain, fully empty bladder or bowels, improved bladder or bowel function, improved comfort, improved pain management and improved quality of life      Objective     Static Posture     Shoulders  Rounded.    Postural Observations    Additional Postural Observation Details  Breathing mechanics (greater chest breathing)    Palpation   Left   Tenderness of the adductor brevis, erector spinae, gluteus low, lumbar paraspinals and piriformis.     Right   Tenderness of the adductor brevis, erector spinae, gluteus low, lumbar paraspinals and piriformis.     Tenderness     Left Hip   Tenderness in the sacroiliac joint.     Right Hip   Tenderness in the sacroiliac joint.     Strength/Myotome Testing     Left Hip   Planes of Motion   Flexion: 4+    Right Hip   Planes of Motion   Flexion: 4+    Ambulation   Weight-Bearing Status   Weight-Bearing Status (Left): full weight bearing   Weight-Bearing Status (Right): full weight-bearing    Assistive device used: none      Abdominal Assessment:      Position: supine exam    Skin inspection:   scars present.   Additional skin inspection details:  section      General Perineum " "Exam:     General perineum exam comments: [facility] (6/26/2024): Re-examination/assessment of perineum and internal PF deferred to next follow-up appointment due to time dedicated on subjective report and pt education    3/18/2024 Treatment Note: \"Considering the pt's history of pelvic pain with examination and intimacy, the assessment was performed more slowly and per pt tolerance. The pt presented with significant tenderness with palpation externally along the labia minora bilaterally. Light touch/STM was effective as noted by a progressive decrease in tenderness and improved tolerance with examination. She demonstrated a drawing in with both contraction and bearing down of the PF, which reflects significant muscle tension and impaired neuromuscular control. STM and gentle MFR were performed to address muscle tension and palpation tenderness of the PF musculature internally. She presented with greater tenderness on the left side of the pelvis as well as along the anterior pelvis (e.g., pubic bone).\"        Visual Inspection of Perineum:   Excursion of perineal body in cephalad direction with contraction of pelvic floor muscles (PFM): weak  Excursion of perineal body in caudal direction with relaxation of pelvic floor muscles (PFM): unable and weak  PFM Contraction Comments:   [facility] (6/26/2024): Re-examination/assessment of perineum and internal PF deferred to next follow-up appointment due to time dedicated on subjective report and pt education    3/18/2024 Treatment Note: \"Considering the pt's history of pelvic pain with examination and intimacy, the assessment was performed more slowly and per pt tolerance. The pt presented with significant tenderness with palpation externally along the labia minora bilaterally. Light touch/STM was effective as noted by a progressive decrease in tenderness and improved tolerance with examination. She demonstrated a drawing in with both contraction and bearing down of the PF, which " "reflects significant muscle tension and impaired neuromuscular control. STM and gentle MFR were performed to address muscle tension and palpation tenderness of the PF musculature internally. She presented with greater tenderness on the left side of the pelvis as well as along the anterior pelvis (e.g., pubic bone).\"    Sphincter Tone Resting: increased  Sphincter Tone Squeeze: increased  Sensation: intact  Tenderness: provoked    Pelvic Organ Prolapse   Comments:   [place] (6/26/2024): Re-examination/assessment of perineum and internal PF deferred to next follow-up appointment due to time dedicated on subjective report and pt education    3/18/2024 Treatment Note: \"Considering the pt's history of pelvic pain with examination and intimacy, the assessment was performed more slowly and per pt tolerance. The pt presented with significant tenderness with palpation externally along the labia minora bilaterally. Light touch/STM was effective as noted by a progressive decrease in tenderness and improved tolerance with examination. She demonstrated a drawing in with both contraction and bearing down of the PF, which reflects significant muscle tension and impaired neuromuscular control. STM and gentle MFR were performed to address muscle tension and palpation tenderness of the PF musculature internally. She presented with greater tenderness on the left side of the pelvis as well as along the anterior pelvis (e.g., pubic bone).\"    Perineal body inspection: within normal limits        Pelvic Floor Muscle Exam:     Muscle Contraction: substitution   Breathing pattern with contraction: holding breath   Pelvic floor muscle relaxation is incomplete.         pelvic floor exam consent given by patient    Pelvic exam completed: vaginally                Precautions: PMH includes: Genital herpes, Advanced maternal age (1st pregnancy), Obesity (affecting pregnancy in 3rd trimester)       3/11 3/18 3/25 4/8 4/15 4/29 5/9 6/26     Manuals     " "        STM/MFR   KS (Posterior pelvis PF)    KS (diaphragm) KS (posterior pelvis; lumbar)     Fascial Mobilization   KS (Abdominal) KS (Abdominal)  KS (abdomen; R thoracic) KS (abdomen; R thoracic)      Internal PF assessment/STM/MFR  KS (vaginal)           Pelvic Compression   KS  KS   KS      Abdominal massage    KS   KS (R side)      Scar Mobilization    KS  KS                                 Neuro Re-Ed             Diaphragmatic Breathing 10x            Pt Edu PF Contraction             Kegel             TA Contraction     KS        TA Contraction - Seated w/ \"shh\"     10x        March Seated on PB     10x ea LE        PPT seated on PB     10x         LAQ Seated on PB     10x ea LE        Chest Press seated on PB     15x        Trunk rotation seated on PB     10x ea direction        Rib expansion - Supine        5x + 10x w/ tactile cue      Kegel/Core + Bent knee fallout       Blue TB 2x10 ea LE                   Ther Ex             Happy Baby             Butterfly Stretch             LTR    10x ea dir.         Open Book    5x R s/l         Side-lying Shld ABD w/ inhale for rib expansion       15x ea dir      Glute Set       10x 5\" hold                                PF Physical Therapy; POC KS KS KS KS KS KS  KS     Anatomy as it relates to pt's presentation/symptoms KS KS KS KS KS KS KS KS     Address pt questions PRN KS KS KS KS KS KS KS KS     HEP KS KS  KS KS KS KS      Dilators - symptoms management/address pain w/ intimacy  KS - Pt education           Update on pt's symptoms/status/function  KS KS KS  KS KS      Pt edu scar mobilization    KS  KS       Pt edu abdominal massage    KS                      Ther Activity             PFDI 20     KS   KS     Vulvar Pain Questionnaire     KS        KS: Update on pt's symptoms/status/function     KS   KS                    Gait Training                                       Modalities                                            "

## 2024-07-09 ENCOUNTER — TELEPHONE (OUTPATIENT)
Age: 39
End: 2024-07-09

## 2024-07-09 NOTE — TELEPHONE ENCOUNTER
Patient needs a D&V, unsure of last LMP due to breastfeeding, cycles are irregular, please advise.

## 2024-07-09 NOTE — PROGRESS NOTES
"Daily Note     Today's date: 7/10/2024  Patient name: Toshia Morel  : 1985  MRN: [id number]  Referring provider: Yamil Schmid MD  Dx:   Encounter Diagnosis     ICD-10-CM    1. Pelvic floor dysfunction in female  M62.89       2. SANDRA (stress urinary incontinence, female)  N39.3       3. Dyspareunia in female  N94.10       4. Pelvic pain in female  R10.2           Start Time: 1508  Stop Time: 1548  Total time in clinic (min): 40 minutes    Subjective: Pt reports that she may be pregnant. She expresses concerns about being pregnant again within a short period of time (7 months postpartum). She has an appointment with her OB-GYN within the coming week. -- Pt notes that she is doing better with sleep. - \"We had a lot of issues resolved this past week.\" Pt notes discussions with her  regarding parenting and assistance from grand-parents. Plans for couples therapy.        Objective: See treatment diary below      Assessment: The pt participated in a skilled physical therapy session that focused on manual intervention and neuromuscular re-education. Duration of manual intervention was shortened and modified in response to the pt's sudden onset of nausea when in side-lying. NR interventions with PPT were introduced to address impairments in neuromuscular control and strength of lumbopelvic/core musculature postpartum. She tolerated treatment well. The pt would benefit from continued PT to address impairments in order to improve her quality of life and bladder/bowel function.       Plan: Continue per plan of care.  Progress treatment as tolerated.       Precautions: PMH includes: Genital herpes, Advanced maternal age (1st pregnancy), Obesity (affecting pregnancy in 3rd trimester)       3/11 3/18 3/25 4/8 4/15 4/29 5/9 6/26 7/10    Manuals             STM/MFR   KS (Posterior pelvis PF)    KS (diaphragm) KS (posterior pelvis; lumbar) KS (posterior pelvis; lumbar; hip add)    Fascial Mobilization   KS " "(Abdominal) KS (Abdominal)  KS (abdomen; R thoracic) KS (abdomen; R thoracic)      Internal PF assessment/STM/MFR  KS (vaginal)           Pelvic Compression   KS  KS   KS      Abdominal massage    KS   KS (R side)      Scar Mobilization    KS  KS                                 Neuro Re-Ed             Diaphragmatic Breathing 10x            Pt Edu PF Contraction             Kegel             TA Contraction     KS        TA Contraction - Seated w/ \"shh\"     10x        March Seated on PB     10x ea LE        PPT seated on PB     10x         LAQ Seated on PB     10x ea LE        Chest Press seated on PB     15x        Trunk rotation seated on PB     10x ea direction        Rib expansion - Supine        5x + 10x w/ tactile cue      Kegel/Core + Bent knee fallout       Blue TB 2x10 ea LE      PPT (Hook-lying)         2x10 w/ 5\" hold    PPT + Iso Hip ADD         2x10 w/ 5\" hold    Pelvic Tilt on PB         Reviewed for HEP    Pelvic Circles on PB         10x                 Ther Ex             Happy Baby             Butterfly Stretch             LTR    10x ea dir.         Open Book    5x R s/l         Side-lying Shld ABD w/ inhale for rib expansion       15x ea dir      Glute Set       10x 5\" hold                                PF Physical Therapy; POC KS KS KS KS KS KS  KS KS    Anatomy as it relates to pt's presentation/symptoms KS KS KS KS KS KS KS KS     Address pt questions PRN KS KS KS KS KS KS KS KS KS    HEP KS KS  KS KS KS KS  KS    Dilators - symptoms management/address pain w/ intimacy  KS - Pt education           Update on pt's symptoms/status/function  KS KS KS  KS KS  KS    Pt edu scar mobilization    KS  KS       Pt edu abdominal massage    KS                      Ther Activity             PFDI 20     KS   KS     Vulvar Pain Questionnaire     KS        SC: Update on pt's symptoms/status/function     KS   KS                    Gait Training                                       Modalities                      "

## 2024-07-10 ENCOUNTER — OFFICE VISIT (OUTPATIENT)
Dept: PHYSICAL THERAPY | Facility: CLINIC | Age: 39
End: 2024-07-10
Payer: COMMERCIAL

## 2024-07-10 ENCOUNTER — TELEPHONE (OUTPATIENT)
Age: 39
End: 2024-07-10

## 2024-07-10 DIAGNOSIS — N94.10 DYSPAREUNIA IN FEMALE: ICD-10-CM

## 2024-07-10 DIAGNOSIS — N39.3 SUI (STRESS URINARY INCONTINENCE, FEMALE): ICD-10-CM

## 2024-07-10 DIAGNOSIS — M62.89 PELVIC FLOOR DYSFUNCTION IN FEMALE: Primary | ICD-10-CM

## 2024-07-10 DIAGNOSIS — R10.2 PELVIC PAIN IN FEMALE: ICD-10-CM

## 2024-07-10 PROCEDURE — 97140 MANUAL THERAPY 1/> REGIONS: CPT

## 2024-07-10 PROCEDURE — 97112 NEUROMUSCULAR REEDUCATION: CPT

## 2024-07-10 PROCEDURE — 97110 THERAPEUTIC EXERCISES: CPT

## 2024-07-10 NOTE — TELEPHONE ENCOUNTER
Patient called in stating that she is PP 6 months, and is unaware of her LMP but patient is reporting a positive pregnancy test on 7/1/24, and is calling now to schedule D&V. Patient was notified that since her LMP is unknown patient is to be scheduled for 2 weeks from the positive test which would be 7/15/24. Attempted to schedule patient, pt stated that she didn't want to see a NP or PA, patient was warm transferred to [facility] in the office for further scheduling purposes

## 2024-07-13 NOTE — PROGRESS NOTES
"Daily Note     Today's date: 7/15/2024  Patient name: Toshia Morel  : 1985  MRN: [id number]  Referring provider: Yamil Schmid MD  Dx:   Encounter Diagnosis     ICD-10-CM    1. Pelvic floor dysfunction in female  M62.89       2. SANDRA (stress urinary incontinence, female)  N39.3       3. Dyspareunia in female  N94.10       4. Pelvic pain in female  R10.2           Start Time: 1550  Stop Time: 1632  Total time in clinic (min): 42 minutes    Subjective: Pt reports that she felt \"good\" after her previous treatment session. She continues to experience nausea and symptoms of right-sided pain, which she felt during her first pregnancy. \"Upper abdomen feels kind of funny.\" She describes it as \"uncomfortable and tight.\" -- Symptoms of pressure on the bladder and a little bit of \"dribbling\" after going to the bathroom. -- Pt expresses concerns about diastasis. -- Pt notes symptoms of constipation and has not been doing well with drinking her water recently.       Objective: See treatment diary below      Assessment: The pt participated in a skilled physical therapy session that focused on manual intervention, neuromuscular re-education, and therapeutic exercise. Kegel's were re-introduced to address impairments in neuromuscular control/strength/endurance of pelvic floor muscles that contribute to symptoms of urinary leakage. NR intervention program as also progressed to improve awareness/contractile strength of core and pelvic floor floor muscles during functional movements, such as ADLs (e.g., bending/lifting infant). She tolerated treatment well. The pt would benefit from continued PT to address impairments in order to improve her quality of life and bladder/bowel function.        Plan: Continue per plan of care.      Precautions: PMH includes: Genital herpes, Advanced maternal age (1st pregnancy), Obesity (affecting pregnancy in 3rd trimester)       3/11 3/18 3/25 4/8 4/15 4/29 5/9 6/26 7/10 7/15   Manuals   " "          STM/MFR   KS (Posterior pelvis PF)    KS (diaphragm) KS (posterior pelvis; lumbar) KS (posterior pelvis; lumbar; hip add)    Fascial Mobilization   KS (Abdominal) KS (Abdominal)  KS (abdomen; R thoracic) KS (abdomen; R thoracic)   KS (abdomen; R thoracic)   Internal PF assessment/STM/MFR  KS (vaginal)           Pelvic Compression   KS  KS   KS      Abdominal massage    KS   KS (R side)      Scar Mobilization    KS  KS                                 Neuro Re-Ed             Diaphragmatic Breathing 10x            Pt Edu PF Contraction          KS   Kegel: Hook-lying          10x (focus on exhale) - 2 bouts   Kegel w/ Iso Hip ADD: Hook-lying           10x w/ 5\" hold   TA Contraction     KS        TA Contraction - Seated w/ \"shh\"     10x        March Seated on PB     10x ea LE        PPT seated on PB     10x         LAQ Seated on PB     10x ea LE        Chest Press seated on PB     15x        Trunk rotation seated on PB     10x ea direction        Rib expansion - Supine        5x + 10x w/ tactile cue      Kegel/Core + Bent knee fallout       Blue TB 2x10 ea LE      PPT (Hook-lying)         2x10 w/ 5\" hold    PPT + Iso Hip ADD         2x10 w/ 5\" hold    Pelvic Tilt on PB         Reviewed for HEP    Pelvic Circles on PB         10x    PPT + SLR          2x10 ea LE   Shld Horizontal ABD w/ Crunch/Core Activation           Green TB 2x10   Mid row + Kegel/Core Activation          Blue TB 10x   Low Row + Kegel/Core activation          Blue TB 10x                Ther Ex             Happy Baby             Butterfly Stretch          2x30\"   LTR    10x ea dir.         Open Book    5x R s/l         Side-lying Shld ABD w/ inhale for rib expansion       15x ea dir      Glute Set       10x 5\" hold                                PF Physical Therapy; POC KS KS KS KS KS KS  KS KS KS   Anatomy as it relates to pt's presentation/symptoms KS KS KS KS KS KS KS KS  KS   Address pt questions PRN KS KS KS KS KS KS KS KS KS KS   HEP " KS KS  KS KS KS KS  KS KS   Dilators - symptoms management/address pain w/ intimacy  KS - Pt education           Update on pt's symptoms/status/function  KS KS KS  KS KS  KS KS   Pt edu scar mobilization    KS  KS       Pt edu abdominal massage    KS                      Ther Activity             PFDI 20     KS   KS     Vulvar Pain Questionnaire     KS        ME: Update on pt's symptoms/status/function     KS   KS                    Gait Training                                       Modalities

## 2024-07-15 ENCOUNTER — OFFICE VISIT (OUTPATIENT)
Dept: PHYSICAL THERAPY | Facility: CLINIC | Age: 39
End: 2024-07-15
Payer: COMMERCIAL

## 2024-07-15 DIAGNOSIS — N94.10 DYSPAREUNIA IN FEMALE: ICD-10-CM

## 2024-07-15 DIAGNOSIS — R10.2 PELVIC PAIN IN FEMALE: ICD-10-CM

## 2024-07-15 DIAGNOSIS — M62.89 PELVIC FLOOR DYSFUNCTION IN FEMALE: Primary | ICD-10-CM

## 2024-07-15 DIAGNOSIS — N39.3 SUI (STRESS URINARY INCONTINENCE, FEMALE): ICD-10-CM

## 2024-07-15 PROCEDURE — 97110 THERAPEUTIC EXERCISES: CPT

## 2024-07-15 PROCEDURE — 97112 NEUROMUSCULAR REEDUCATION: CPT

## 2024-07-15 PROCEDURE — 97140 MANUAL THERAPY 1/> REGIONS: CPT

## 2024-07-17 ENCOUNTER — ULTRASOUND (OUTPATIENT)
Dept: OBGYN CLINIC | Facility: CLINIC | Age: 39
End: 2024-07-17
Payer: COMMERCIAL

## 2024-07-17 ENCOUNTER — PATIENT MESSAGE (OUTPATIENT)
Dept: OBGYN CLINIC | Facility: CLINIC | Age: 39
End: 2024-07-17

## 2024-07-17 VITALS
HEIGHT: 61 IN | SYSTOLIC BLOOD PRESSURE: 118 MMHG | BODY MASS INDEX: 36.1 KG/M2 | WEIGHT: 191.2 LBS | DIASTOLIC BLOOD PRESSURE: 76 MMHG

## 2024-07-17 DIAGNOSIS — O36.80X0 PREGNANCY OF UNKNOWN ANATOMIC LOCATION: Primary | ICD-10-CM

## 2024-07-17 PROCEDURE — 76817 TRANSVAGINAL US OBSTETRIC: CPT | Performed by: STUDENT IN AN ORGANIZED HEALTH CARE EDUCATION/TRAINING PROGRAM

## 2024-07-17 PROCEDURE — 99213 OFFICE O/P EST LOW 20 MIN: CPT | Performed by: STUDENT IN AN ORGANIZED HEALTH CARE EDUCATION/TRAINING PROGRAM

## 2024-07-17 NOTE — ASSESSMENT & PLAN NOTE
- Reviewed that results of ultrasound today technically represent pregnancy of unknown anatomic location. We reviewed that this transient diagnosis encompasses early normal pregnancy, early pregnancy loss, and ectopic pregnancy.   - Will send for serial hCG to assist in distinguishing between above diagnoses. Patient will have first hCG drawn tomorrow morning and second 48 hours later on Saturday morning.   - Signs/symptoms of ruptured ectopic reviewed. Instructed that if she develops pelvic pain or bleeding, she should call the office/on-call physician.   - Will tentatively schedule repeat ultrasound in office in 14+ days.   - Further plan to be determined pending results of above.

## 2024-07-17 NOTE — PROGRESS NOTES
[facility] [address], [address], [place] [address]    Assessment/Plan:  1. Pregnancy of unknown anatomic location  Assessment & Plan:  - Reviewed that results of ultrasound today technically represent pregnancy of unknown anatomic location. We reviewed that this transient diagnosis encompasses early normal pregnancy, early pregnancy loss, and ectopic pregnancy.   - Will send for serial hCG to assist in distinguishing between above diagnoses. Patient will have first hCG drawn tomorrow morning and second 48 hours later on Saturday morning.   - Signs/symptoms of ruptured ectopic reviewed. Instructed that if she develops pelvic pain or bleeding, she should call the office/on-call physician.   - Will tentatively schedule repeat ultrasound in office in 14+ days.   - Further plan to be determined pending results of above.   Orders:  -     hCG, quantitative; Future  -     hCG, quantitative  -     hCG, quantitative; Future  -     hCG, quantitative  -     AMB US OB < 14 weeks single or first gestation level 1      Subjective:   Toshia Morel is a 39 y.o.  .  CC:   Chief Complaint   Patient presents with    Pregnancy Ultrasound       HPI: Patient presents for evaluation of missed menses. She is currently breastfeeding. She reports positive pregnancy test on . Her last menstrual period is unknown, but she believes it was in May. She denies vaginal bleeding, pelvic pain, or discharge.     ROS: Negative except as noted in HPI    Patient's last menstrual period was 2024 (approximate).       She  reports being sexually active and has had partner(s) who are male. She reports using the following method of birth control/protection: None.       The following portions of the patient's history were reviewed and updated as appropriate:   Past Medical History:   Diagnosis Date    Asthma     Endometriosis     GERD (gastroesophageal reflux disease)     Herpes     IBS (irritable bowel  syndrome)     Lactose intolerance      Past Surgical History:   Procedure Laterality Date    APPENDECTOMY      COLONOSCOPY      CYSTECTOMY Right 2013    right ovary    EGD      NASAL SEPTUM SURGERY      ME  DELIVERY ONLY N/A 2023    Procedure:  SECTION ();  Surgeon: Yamil Schmid MD;  Location: [facility];  Service: Obstetrics     Family History   Adopted: Yes   Problem Relation Age of Onset    Breast cancer Mother     Heart disease Father     Colon cancer Neg Hx      Social History     Socioeconomic History    Marital status: Registered Domestic Partner     Spouse name: None    Number of children: None    Years of education: None    Highest education level: None   Occupational History    None   Tobacco Use    Smoking status: Never     Passive exposure: Never    Smokeless tobacco: Never   Vaping Use    Vaping status: Never Used   Substance and Sexual Activity    Alcohol use: Not Currently     Comment: rare    Drug use: Never    Sexual activity: Yes     Partners: Male     Birth control/protection: None   Other Topics Concern    None   Social History Narrative    None     Social Determinants of Health     Financial Resource Strain: Not on file   Food Insecurity: No Food Insecurity (2023)    [facility]    Hunger Vital Sign     Worried About Running Out of Food in the Last Year: Never true     Ran Out of Food in the Last Year: Never true   Transportation Needs: No Transportation Needs (2023)    [facility]    PRAPARE - Transportation     Lack of Transportation (Medical): No     Lack of Transportation (Non-Medical): No   Physical Activity: Not on file   Stress: Not on file   Social Connections: Not on file   Intimate Partner Violence: Not on file   Housing Stability: Unknown (2023)    Received from [facility], [facility]  "Care    Housing Stability Vital Sign     Unable to Pay for Housing in the Last Year: No     Number of Places Lived in the Last Year: Not on file     In the last 12 months, was there a time when you did not have a steady place to sleep or slept in a shelter (including now)?: No     Outpatient Medications Marked as Taking for the 7/17/24 encounter (Ultrasound) with Yamil Schmid MD   Medication    Prenatal Vit-Fe Fumarate-FA (PRENATAL VITAMIN PO)    valACYclovir (VALTREX) 500 mg tablet     Allergies   Allergen Reactions    Amoxicillin Anaphylaxis, Hives, Itching, GI Intolerance, Palpitations, Rash and Shortness Of Breath    Dicyclomine Anxiety         FIRST TRIMESTER OBSTETRIC ULTRASOUND  Date of study: 7/17/2024  Performed by: Yamil Schmid MD     INDICATION: Amenorrhea, viability    COMPARISON: None.     TECHNIQUE:   Transvaginal imaging was performed to assess the gestation, myometrial/endometrial architecture and ovarian parenchymal detail.    The study includes volumetric sweeps and traditional still imaging technique.      FINDINGS:     An intrauterine sac-like structure is present. There is no definitive yolk sac or fetal pole. Mean sac diameter of 10.9 mm corresponds to estimated gestational age of 5w1d.      UTERUS/ADNEXA:   No adnexal mass or pathologic cyst.  No free fluid identified.     IMPRESSION:    Patient's last menstrual period was 05/08/2024 (approximate). Patient is currently breastfeeding.   Gestational age based on US: 5w1d by MSD alone    Findings of ultrasound technically represent pregnancy of unknown anatomic location. Correlation with serial hCG is recommended.   No adnexal masses seen. There is no overt evidence of ectopic pregnancy.     Yamil Schmid MD  7/17/2024 6:20 PM    Objective:  /76 (BP Location: Right arm, Patient Position: Sitting, Cuff Size: Standard)   Ht 5' 1\" (1.549 m)   Wt 86.7 kg (191 lb 3.2 oz)   LMP 05/08/2024 (Approximate)   Breastfeeding Yes   BMI " 36.13 kg/m²        Chaperone present? Yes: Flaco Knapp MA.    General Appearance: alert and oriented, in no acute distress.   Abdomen: Soft, non-tender, non-distended, no masses, no rebound or guarding.  Extremities: Normal range of motion.   Skin: normal, no rash or abnormalities  Neurologic: alert, oriented x3  Psychiatric: Appropriate affect, mood stable, cooperative with exam.        Yamil Schmid MD  7/17/2024 6:23 PM

## 2024-07-17 NOTE — PROGRESS NOTES
Ultrasound Probe Disinfection    A transvaginal ultrasound was performed.   Prior to use, disinfection was performed with High Level Disinfection Process (doFormson)  Probe serial number [id number]:  [id number] was used    Flaco Knapp  07/17/24  6:04 PM

## 2024-07-18 DIAGNOSIS — O36.80X0 PREGNANCY OF UNKNOWN ANATOMIC LOCATION: Primary | ICD-10-CM

## 2024-07-19 LAB — HCG INTACT+B SERPL-ACNC: 3510 MIU/ML

## 2024-07-21 ENCOUNTER — APPOINTMENT (EMERGENCY)
Dept: ULTRASOUND IMAGING | Facility: HOSPITAL | Age: 39
End: 2024-07-21
Payer: COMMERCIAL

## 2024-07-21 ENCOUNTER — TELEPHONE (OUTPATIENT)
Dept: LABOR AND DELIVERY | Facility: HOSPITAL | Age: 39
End: 2024-07-21

## 2024-07-21 ENCOUNTER — HOSPITAL ENCOUNTER (EMERGENCY)
Facility: HOSPITAL | Age: 39
Discharge: HOME | End: 2024-07-21
Payer: COMMERCIAL

## 2024-07-21 VITALS
SYSTOLIC BLOOD PRESSURE: 111 MMHG | HEIGHT: 61 IN | WEIGHT: 191.14 LBS | RESPIRATION RATE: 18 BRPM | BODY MASS INDEX: 36.09 KG/M2 | OXYGEN SATURATION: 97 % | DIASTOLIC BLOOD PRESSURE: 57 MMHG | TEMPERATURE: 98.1 F | HEART RATE: 84 BPM

## 2024-07-21 DIAGNOSIS — R10.9 ABDOMINAL PAIN IN PREGNANCY, FIRST TRIMESTER: Primary | ICD-10-CM

## 2024-07-21 DIAGNOSIS — O26.891 ABDOMINAL PAIN IN PREGNANCY, FIRST TRIMESTER: Primary | ICD-10-CM

## 2024-07-21 LAB
ABO GROUP BLD: NORMAL
ALBUMIN SERPL BCG-MCNC: 4.4 G/DL (ref 3.5–5)
ALP SERPL-CCNC: 58 U/L (ref 34–104)
ALT SERPL W P-5'-P-CCNC: 12 U/L (ref 7–52)
ANION GAP SERPL CALCULATED.3IONS-SCNC: 7 MMOL/L (ref 4–13)
AST SERPL W P-5'-P-CCNC: 12 U/L (ref 13–39)
B-HCG SERPL-ACNC: 6336.4 MIU/ML (ref 0–5)
BASOPHILS # BLD AUTO: 0.04 THOUSANDS/ÂΜL (ref 0–0.1)
BASOPHILS NFR BLD AUTO: 1 % (ref 0–1)
BILIRUB SERPL-MCNC: 0.26 MG/DL (ref 0.2–1)
BILIRUB UR QL STRIP: NEGATIVE
BUN SERPL-MCNC: 17 MG/DL (ref 5–25)
CALCIUM SERPL-MCNC: 9.3 MG/DL (ref 8.4–10.2)
CHLORIDE SERPL-SCNC: 104 MMOL/L (ref 96–108)
CLARITY UR: CLEAR
CO2 SERPL-SCNC: 26 MMOL/L (ref 21–32)
COLOR UR: YELLOW
CREAT SERPL-MCNC: 0.8 MG/DL (ref 0.6–1.3)
EOSINOPHIL # BLD AUTO: 0.09 THOUSAND/ÂΜL (ref 0–0.61)
EOSINOPHIL NFR BLD AUTO: 1 % (ref 0–6)
ERYTHROCYTE [DISTWIDTH] IN BLOOD BY AUTOMATED COUNT: 12.3 % (ref 11.6–15.1)
GFR SERPL CREATININE-BSD FRML MDRD: 91 ML/MIN/1.73SQ M
GLUCOSE SERPL-MCNC: 88 MG/DL (ref 65–140)
GLUCOSE UR STRIP-MCNC: NEGATIVE MG/DL
HCG INTACT+B SERPL-ACNC: 4096 MIU/ML
HCT VFR BLD AUTO: 43.1 % (ref 34.8–46.1)
HGB BLD-MCNC: 14.4 G/DL (ref 11.5–15.4)
HGB UR QL STRIP.AUTO: NEGATIVE
IMM GRANULOCYTES # BLD AUTO: 0.05 THOUSAND/UL (ref 0–0.2)
IMM GRANULOCYTES NFR BLD AUTO: 1 % (ref 0–2)
KETONES UR STRIP-MCNC: NEGATIVE MG/DL
LEUKOCYTE ESTERASE UR QL STRIP: NEGATIVE
LYMPHOCYTES # BLD AUTO: 2.49 THOUSANDS/ÂΜL (ref 0.6–4.47)
LYMPHOCYTES NFR BLD AUTO: 30 % (ref 14–44)
MCH RBC QN AUTO: 28.4 PG (ref 26.8–34.3)
MCHC RBC AUTO-ENTMCNC: 33.4 G/DL (ref 31.4–37.4)
MCV RBC AUTO: 85 FL (ref 82–98)
MONOCYTES # BLD AUTO: 0.96 THOUSAND/ÂΜL (ref 0.17–1.22)
MONOCYTES NFR BLD AUTO: 11 % (ref 4–12)
NEUTROPHILS # BLD AUTO: 4.78 THOUSANDS/ÂΜL (ref 1.85–7.62)
NEUTS SEG NFR BLD AUTO: 56 % (ref 43–75)
NITRITE UR QL STRIP: NEGATIVE
NRBC BLD AUTO-RTO: 0 /100 WBCS
PH UR STRIP.AUTO: 7 [PH]
PLATELET # BLD AUTO: 233 THOUSANDS/UL (ref 149–390)
PMV BLD AUTO: 10.7 FL (ref 8.9–12.7)
POTASSIUM SERPL-SCNC: 3.9 MMOL/L (ref 3.5–5.3)
PROT SERPL-MCNC: 7.3 G/DL (ref 6.4–8.4)
PROT UR STRIP-MCNC: NEGATIVE MG/DL
RBC # BLD AUTO: 5.07 MILLION/UL (ref 3.81–5.12)
RH BLD: POSITIVE
SODIUM SERPL-SCNC: 137 MMOL/L (ref 135–147)
SP GR UR STRIP.AUTO: 1.01 (ref 1–1.03)
TSH SERPL DL<=0.05 MIU/L-ACNC: 1.31 UIU/ML (ref 0.45–4.5)
UROBILINOGEN UR STRIP-ACNC: <2 MG/DL
WBC # BLD AUTO: 8.41 THOUSAND/UL (ref 4.31–10.16)

## 2024-07-21 PROCEDURE — 84702 CHORIONIC GONADOTROPIN TEST: CPT

## 2024-07-21 PROCEDURE — 80053 COMPREHEN METABOLIC PANEL: CPT

## 2024-07-21 PROCEDURE — 96375 TX/PRO/DX INJ NEW DRUG ADDON: CPT

## 2024-07-21 PROCEDURE — 86900 BLOOD TYPING SEROLOGIC ABO: CPT

## 2024-07-21 PROCEDURE — 84443 ASSAY THYROID STIM HORMONE: CPT

## 2024-07-21 PROCEDURE — 96365 THER/PROPH/DIAG IV INF INIT: CPT

## 2024-07-21 PROCEDURE — 99285 EMERGENCY DEPT VISIT HI MDM: CPT

## 2024-07-21 PROCEDURE — 99284 EMERGENCY DEPT VISIT MOD MDM: CPT

## 2024-07-21 PROCEDURE — 86901 BLOOD TYPING SEROLOGIC RH(D): CPT

## 2024-07-21 PROCEDURE — 81003 URINALYSIS AUTO W/O SCOPE: CPT

## 2024-07-21 PROCEDURE — 85025 COMPLETE CBC W/AUTO DIFF WBC: CPT

## 2024-07-21 PROCEDURE — 76815 OB US LIMITED FETUS(S): CPT

## 2024-07-21 PROCEDURE — 96366 THER/PROPH/DIAG IV INF ADDON: CPT

## 2024-07-21 PROCEDURE — 87086 URINE CULTURE/COLONY COUNT: CPT

## 2024-07-21 PROCEDURE — 36415 COLL VENOUS BLD VENIPUNCTURE: CPT

## 2024-07-21 RX ORDER — ONDANSETRON 2 MG/ML
4 INJECTION INTRAMUSCULAR; INTRAVENOUS ONCE
Status: COMPLETED | OUTPATIENT
Start: 2024-07-21 | End: 2024-07-21

## 2024-07-21 RX ORDER — SODIUM CHLORIDE, SODIUM GLUCONATE, SODIUM ACETATE, POTASSIUM CHLORIDE, MAGNESIUM CHLORIDE, SODIUM PHOSPHATE, DIBASIC, AND POTASSIUM PHOSPHATE .53; .5; .37; .037; .03; .012; .00082 G/100ML; G/100ML; G/100ML; G/100ML; G/100ML; G/100ML; G/100ML
1000 INJECTION, SOLUTION INTRAVENOUS ONCE
Status: COMPLETED | OUTPATIENT
Start: 2024-07-21 | End: 2024-07-21

## 2024-07-21 RX ORDER — ACETAMINOPHEN 325 MG/1
975 TABLET ORAL ONCE
Status: COMPLETED | OUTPATIENT
Start: 2024-07-21 | End: 2024-07-21

## 2024-07-21 RX ADMIN — SODIUM CHLORIDE, SODIUM GLUCONATE, SODIUM ACETATE, POTASSIUM CHLORIDE, MAGNESIUM CHLORIDE, SODIUM PHOSPHATE, DIBASIC, AND POTASSIUM PHOSPHATE 1000 ML: .53; .5; .37; .037; .03; .012; .00082 INJECTION, SOLUTION INTRAVENOUS at 20:32

## 2024-07-21 RX ADMIN — ACETAMINOPHEN 975 MG: 325 TABLET, FILM COATED ORAL at 20:24

## 2024-07-21 RX ADMIN — ONDANSETRON 4 MG: 2 INJECTION INTRAMUSCULAR; INTRAVENOUS at 20:24

## 2024-07-22 ENCOUNTER — TELEPHONE (OUTPATIENT)
Dept: LABOR AND DELIVERY | Facility: HOSPITAL | Age: 39
End: 2024-07-22

## 2024-07-22 DIAGNOSIS — O36.80X0 PREGNANCY WITH UNCERTAIN FETAL VIABILITY, SINGLE OR UNSPECIFIED FETUS: Primary | ICD-10-CM

## 2024-07-22 NOTE — ED CARE HANDOFF
Emergency Department Sign Out Note        Sign out and transfer of care from Dr. Palladino. See Separate Emergency Department note.     The patient, Toshia Morel, was evaluated by the previous provider for abdominal pain in pregnancy, r/o ectopic.                                  ED Course as of 07/21/24 2336   Sun Jul 21, 2024   2330 On reevaluation patient resting comfortably, does have persistent discomfort in right lower quadrant ultrasound with visible yolk sac, discussed with OB/GYN on-call Dr. Pitt unlikely ectopic with this presentation, patient stable to follow-up with OB/GYN in the office as an outpatient no indication for further inpatient evaluation or treatment stable for discharge at this time     Procedures  Medical Decision Making  Amount and/or Complexity of Data Reviewed  Labs: ordered.  Radiology: ordered.    Risk  OTC drugs.  Prescription drug management.            Disposition  Final diagnoses:   Abdominal pain in pregnancy, first trimester     Time reflects when diagnosis was documented in both MDM as applicable and the Disposition within this note       Time User Action Codes Description Comment    7/21/2024 11:33 PM Ana Maria Head Add [O26.891,  R10.9] Abdominal pain in pregnancy, first trimester           ED Disposition       ED Disposition   Discharge    Condition   Stable    Date/Time   Sun Jul 21, 2024 11:33 PM    Comment   Toshia Morel discharge to home/self care.                   Follow-up Information       Follow up With Specialties Details Why Contact Info Additional Information     [facility] Emergency Medicine  If symptoms worsen [address] [facility] [address] [phone] [facility], [address] [place], [place] [address]    Yg Mercer,  Family Medicine Schedule an appointment as soon as possible for a visit   [address] [place] [address]  [address] [place] [id number]  [phone]             Patient's Medications   Discharge Prescriptions    No medications on file     No discharge procedures on file.       ED Provider  Electronically Signed by     Ana Maria Head DO  07/21/24 7353

## 2024-07-22 NOTE — ED PROVIDER NOTES
History  Chief Complaint   Patient presents with    Medical Problem     Here for r/o ectopic; diagnosed with unviable pregnancy Wednesday and having increased pain, mostly right sided; started light spotting today     39-year-old female presenting to the emergency department for rule out ectopic.  Patient diagnosed with nonviable pregnancy on Wednesday.  Patient since has had increased pain mostly on the right lower quadrant.  Patient started with light spotting today.  Patient notes her last menstrual period was early May.  Patient currently breast-feeding 7-month-old son.  Denies any fevers or chills chest pain or shortness of breath.  Patient notes nausea as well as episodes of vomiting nonbloody nonbilious.  She denies any dysuria or hematuria she denies any vaginal discharge.        Prior to Admission Medications   Prescriptions Last Dose Informant Patient Reported? Taking?   Prenatal Vit-Fe Fumarate-FA (PRENATAL VITAMIN PO)   Yes No   Sig: Take by mouth   valACYclovir (VALTREX) 500 mg tablet  Self Yes No   Sig: Take 500 mg by mouth 2 (two) times a day      Facility-Administered Medications: None       Past Medical History:   Diagnosis Date    Asthma     Endometriosis     GERD (gastroesophageal reflux disease)     Herpes     IBS (irritable bowel syndrome)     Lactose intolerance        Past Surgical History:   Procedure Laterality Date    APPENDECTOMY      COLONOSCOPY      CYSTECTOMY Right     right ovary    EGD      NASAL SEPTUM SURGERY      [place]  DELIVERY ONLY N/A 2023    Procedure:  SECTION ();  Surgeon: Yamil Schmid MD;  Location: [facility];  Service: Obstetrics       Family History   Adopted: Yes   Problem Relation Age of Onset    Breast cancer Mother     Heart disease Father     Colon cancer Neg Hx      I have reviewed and agree with the history as documented.    E-Cigarette/Vaping    E-Cigarette Use Never User      E-Cigarette/Vaping Substances    Nicotine No     THC No      CBD No     Flavoring No     Other No     Unknown No      Social History     Tobacco Use    Smoking status: Never     Passive exposure: Never    Smokeless tobacco: Never   Vaping Use    Vaping status: Never Used   Substance Use Topics    Alcohol use: Not Currently     Comment: rare    Drug use: Never       Review of Systems   Constitutional:  Negative for activity change, chills and fever.   HENT:  Negative for congestion, ear pain and sore throat.    Eyes:  Negative for pain and visual disturbance.   Respiratory:  Negative for cough, chest tightness and shortness of breath.    Cardiovascular:  Negative for chest pain and palpitations.   Gastrointestinal:  Positive for abdominal pain. Negative for constipation, diarrhea, nausea and vomiting.   Genitourinary:  Positive for vaginal bleeding. Negative for decreased urine volume, difficulty urinating, dysuria, flank pain, hematuria, pelvic pain, urgency and vaginal pain.   Musculoskeletal:  Negative for arthralgias, back pain, myalgias and neck pain.   Skin:  Negative for color change and rash.   Neurological:  Negative for seizures and syncope.   Psychiatric/Behavioral:  Negative for agitation and behavioral problems.    All other systems reviewed and are negative.      Physical Exam  Physical Exam  Vitals and nursing note reviewed.   Constitutional:       General: She is not in acute distress.     Appearance: She is well-developed.   HENT:      Head: Normocephalic and atraumatic.      Right Ear: External ear normal.      Left Ear: External ear normal.      Nose: Nose normal.      Mouth/Throat:      Mouth: Mucous membranes are moist.   Eyes:      Extraocular Movements: Extraocular movements intact.      Conjunctiva/sclera: Conjunctivae normal.   Cardiovascular:      Rate and Rhythm: Normal rate and regular rhythm.      Heart sounds: No murmur heard.  Pulmonary:      Effort: Pulmonary effort is normal. No respiratory distress.      Breath sounds: Normal breath sounds.    Abdominal:      Palpations: Abdomen is soft.      Tenderness: There is abdominal tenderness.      Comments: RLQ pain to palpation   Musculoskeletal:         General: No swelling.      Cervical back: Normal range of motion and neck supple.   Skin:     General: Skin is warm and dry.      Capillary Refill: Capillary refill takes less than 2 seconds.   Neurological:      General: No focal deficit present.      Mental Status: She is alert.   Psychiatric:         Mood and Affect: Mood normal.         Behavior: Behavior normal.         Vital Signs  ED Triage Vitals [07/21/24 2000]   Temperature Pulse Respirations Blood Pressure SpO2   98.1 °F (36.7 °C) 91 18 113/83 98 %      Temp Source Heart Rate Source Patient Position - Orthostatic VS BP Location FiO2 (%)   Temporal Monitor Sitting Left arm --      Pain Score       6           Vitals:    07/21/24 2000 07/21/24 2100 07/21/24 2200 07/21/24 2300   BP: 113/83 114/61 116/63 111/57   Pulse: 91 77 70 84   Patient Position - Orthostatic VS: Sitting            Visual Acuity      ED Medications  Medications   acetaminophen (TYLENOL) tablet 975 mg (975 mg Oral Given 7/21/24 2024)   multi-electrolyte (PLASMALYTE-A/ISOLYTE-S PH 7.4) IV solution 1,000 mL (0 mL Intravenous Stopped 7/21/24 2345)   ondansetron (ZOFRAN) injection 4 mg (4 mg Intravenous Given 7/21/24 2024)       Diagnostic Studies  Results Reviewed       Procedure Component Value Units Date/Time    Pregnancy, hCG, quantitative [[id number]]  (Abnormal) Collected: 07/21/24 2017    Lab Status: Final result Specimen: Blood from Arm, Left Updated: 07/21/24 2117     HCG, Quant 6,336.4 mIU/mL     Narrative:       Expected Ranges:    HCG results between 5.0 and 25.0 mIU/mL may be indicative of early pregnancy but should be interpreted in light of the total clinical presentation.    HCG can rise to detectable levels in glen and post menopausal women (0-11.6 mIU/mL).     Approximate               Approximate HCG  Gestation age           Concentration ( mIU/mL)  _____________          ______________________   Weeks                      HCG values  0.2-1                       5-50  1-2                           2-3                         100-5000  3-4                         500-67805  4-5                         1000-78783  5-6                         [id number]  6-8                         63295-606146  8-12                        01293-692110      TSH, 3rd generation with Free T4 reflex [[id number]]  (Normal) Collected: 07/21/24 2017    Lab Status: Final result Specimen: Blood from Arm, Left Updated: 07/21/24 2105     TSH 3RD GENERATON 1.315 uIU/mL     UA w Reflex to Microscopic w Reflex to Culture [[id number]] Collected: 07/21/24 2044    Lab Status: Final result Specimen: Urine, Clean Catch Updated: 07/21/24 2052     Color, UA Yellow     Clarity, UA Clear     Specific Gravity, UA 1.015     pH, UA 7.0     Leukocytes, UA Negative     Nitrite, UA Negative     Protein, UA Negative mg/dl      Glucose, UA Negative mg/dl      Ketones, UA Negative mg/dl      Urobilinogen, UA <2.0 mg/dl      Bilirubin, UA Negative     Occult Blood, UA Negative     URINE COMMENT --    Urine culture [[id number]] Collected: 07/21/24 2044    Lab Status: In process Specimen: Urine, Clean Catch Updated: 07/21/24 2052    Comprehensive metabolic panel [[id number]]  (Abnormal) Collected: 07/21/24 2017    Lab Status: Final result Specimen: Blood from Arm, Left Updated: 07/21/24 2051     Sodium 137 mmol/L      Potassium 3.9 mmol/L      Chloride 104 mmol/L      CO2 26 mmol/L      ANION GAP 7 mmol/L      BUN 17 mg/dL      Creatinine 0.80 mg/dL      Glucose 88 mg/dL      Calcium 9.3 mg/dL      AST 12 U/L      ALT 12 U/L      Alkaline Phosphatase 58 U/L      Total Protein 7.3 g/dL      Albumin 4.4 g/dL      Total Bilirubin 0.26 mg/dL      eGFR 91 ml/min/1.73sq m     Narrative:      National Kidney Disease Foundation guidelines for Chronic Kidney Disease (CKD):     Stage 1 with  normal or high GFR (GFR > 90 mL/min/1.73 square meters)    Stage 2 Mild CKD (GFR = 60-89 mL/min/1.73 square meters)    Stage 3A Moderate CKD (GFR = 45-59 mL/min/1.73 square meters)    Stage 3B Moderate CKD (GFR = 30-44 mL/min/1.73 square meters)    Stage 4 Severe CKD (GFR = 15-29 mL/min/1.73 square meters)    Stage 5 End Stage CKD (GFR <15 mL/min/1.73 square meters)  Note: GFR calculation is accurate only with a steady state creatinine    CBC and differential [[id number]] Collected: 24    Lab Status: Final result Specimen: Blood from Arm, Left Updated: 24     WBC 8.41 Thousand/uL      RBC 5.07 Million/uL      Hemoglobin 14.4 g/dL      Hematocrit 43.1 %      MCV 85 fL      MCH 28.4 pg      MCHC 33.4 g/dL      RDW 12.3 %      MPV 10.7 fL      Platelets 233 Thousands/uL      nRBC 0 /100 WBCs      Segmented % 56 %      Immature Grans % 1 %      Lymphocytes % 30 %      Monocytes % 11 %      Eosinophils Relative 1 %      Basophils Relative 1 %      Absolute Neutrophils 4.78 Thousands/µL      Absolute Immature Grans 0.05 Thousand/uL      Absolute Lymphocytes 2.49 Thousands/µL      Absolute Monocytes 0.96 Thousand/µL      Eosinophils Absolute 0.09 Thousand/µL      Basophils Absolute 0.04 Thousands/µL                    US OB pregnancy limited with transvaginal   Final Result by Oniel Paniagua MD ([id number])      Intrauterine gestational sac measuring 11 mm which is too small for accurate dating. A tiny yolk sac is visible. A fetal pole or heart rate is not yet visualized.   Differential remains early IUP versus spontaneous .  Correlate with serial quantitative BHCG as well as a follow-up OB ultrasound in 1 to 2 weeks is recommended.            Workstation performed: [id number]                    Procedures  Procedures         ED Course  ED Course as of 24 0729   Sun  - given the presentation, will check CBC for marked leukocytosis  - CMP for liver enzyme elevation  that could signal cholecystitis, biliary obstructive disease. Check RFTs for ERMA / markers of dehydration.  - Urine: will check for UTI or signs of pyelonephritis.   - Pregnancy test: patient with known + test; LMP early may.   - Lastly, will consider abdominal imaging.  - U/s compelte to evaluat for ectopic vs torsion if inconclusive would evaluate with CT  - would also consider OB consultation.   -Symptomatic treatment with fluids, Zofran as well as Tylenol.  -Patient and her  are aware of the current plan.  All questions concerns answered.  Will frequently reevaluate.  - Disposition per workup.      2017 Blood Pressure: 113/83   2017 Temperature: 98.1 °F (36.7 °C)   2017 Temp Source: Temporal   2017 Pulse: 91   2017 Respirations: 18   2017 SpO2: 98 %   2026 WBC: 8.41  No leukocytosis noted   2026 Hemoglobin: 14.4  Not anemic   2057 Leukocytes, UA: Negative   2057 Nitrite, UA: Negative   2057 Sodium: 137   2057 Creatinine: 0.80   2057 BUN: 17   2101 Rh Factor: Positive   2102 ABO Grouping: O   2121 HCG QUANTITATIVE(!): 6,336.4  Slightly up form prior. Patient resting comfortably at this time.updated on all results.Pending US results.   2135 Patient signed out to Dr. Head prior to final disposition. Pending US results.                                                Medical Decision Making  Amount and/or Complexity of Data Reviewed  Labs: ordered. Decision-making details documented in ED Course.  Radiology: ordered.    Risk  OTC drugs.  Prescription drug management.                 Disposition  Final diagnoses:   Abdominal pain in pregnancy, first trimester     Time reflects when diagnosis was documented in both MDM as applicable and the Disposition within this note       Time User Action Codes Description Comment    7/21/2024 11:33 PM Ana Maria Head Add [O26.891,  R10.9] Abdominal pain in pregnancy, first trimester           ED Disposition       ED Disposition   Discharge    Condition   Stable    Date/Time    Sun Jul 21, 2024 11:33 PM    Comment   Toshia Morel discharge to home/self care.                   Follow-up Information       Follow up With Specialties Details Why Contact Info Additional Information     [facility] Emergency [facility] Emergency Medicine  If symptoms worsen [address] [address] [phone] [facility] Emergency Department, [address], [place] [address]    Yg Mercer DO Family Medicine Schedule an appointment as soon as possible for a visit   [facility] [address] [address]  [phone]               Discharge Medication List as of 7/21/2024 11:34 PM        CONTINUE these medications which have NOT CHANGED    Details   Prenatal Vit-Fe Fumarate-FA (PRENATAL VITAMIN PO) Take by mouth, Historical Med      valACYclovir (VALTREX) 500 mg tablet Take 500 mg by mouth 2 (two) times a day, Historical Med             No discharge procedures on file.    PDMP Review       None            ED Provider  Electronically Signed by             Annette Maria Palladino, DO  07/22/24 0729

## 2024-07-22 NOTE — TELEPHONE ENCOUNTER
Called patient to follow up after ER visit. Discussed early IUP on ultrasound. Ectopic effectively ruled out. Patient reports improvement in her abdominal pain. Given prior abnormal rise in hCG from [facility] labs, then significant increase the day following in ER, reasonable to obtain one more hCG value to assess trend. She will have this drawn on 7/24. She understands that hCG cannot determine pregnancy viability. Rather, this will be determined by US. She is scheduled for repeat US with Dr. Link on 8/1.    Yamil Schmid MD  7/22/2024 3:37 PM

## 2024-07-22 NOTE — DISCHARGE INSTRUCTIONS
Please follow-up with your OB/GYN for further care, if symptoms worsen please return to the emergency department

## 2024-07-23 LAB — BACTERIA UR CULT: NORMAL

## 2024-07-24 ENCOUNTER — APPOINTMENT (OUTPATIENT)
Dept: PHYSICAL THERAPY | Facility: CLINIC | Age: 39
End: 2024-07-24
Payer: COMMERCIAL

## 2024-07-25 LAB — HCG INTACT+B SERPL-ACNC: 4367 MIU/ML

## 2024-07-26 NOTE — TELEPHONE ENCOUNTER
Outgoing call to patient. Patient did have hcg ordered. [facility] states that they sent result to [facility]. Chart reviewed, has not been uploaded to chart.     Patient states that she does have record of result - will attach to portal message and send to Dr. Schmid.     Patients bleeding is steady - has decreased from heavy bleeding she was experiencing on Wednesday and Thursday this week. Denies soaking through 1 pad/hour or passing large clots. Is having abdominal cramping, moderate. Bleeding precautions reviewed and patient verbalized understanding.     Hcg result received through portal - 4,367 on 7/24 at 12:15pm.

## 2024-07-26 NOTE — TELEPHONE ENCOUNTER
Please apologize to patient we did not receive results and thank her for sending picture to us.      Reviewed results, although increase from last done 7/20/2024, not appropriate for normal pregnancy and given bleeding and abnormal rise, this appears to be a miscarriage, which was reviewed with patient before.  No further hcg necessary.  Continue bleeding precautions and keep f/u appt 8/1/2024 for ultrasound.

## 2024-07-26 NOTE — TELEPHONE ENCOUNTER
Left VMM to patient ( has signed communication consent on file) Reviewed providers recommendations, offered my condolences on her loss and advised her to continue to monitor bleeding, no other labs needed. Call offices back with any questions or concerns. Provided #.

## 2024-07-31 ENCOUNTER — APPOINTMENT (OUTPATIENT)
Dept: PHYSICAL THERAPY | Facility: CLINIC | Age: 39
End: 2024-07-31
Payer: COMMERCIAL

## 2024-08-01 ENCOUNTER — ULTRASOUND (OUTPATIENT)
Dept: OBGYN CLINIC | Facility: CLINIC | Age: 39
End: 2024-08-01
Payer: COMMERCIAL

## 2024-08-01 VITALS — SYSTOLIC BLOOD PRESSURE: 104 MMHG | DIASTOLIC BLOOD PRESSURE: 60 MMHG | WEIGHT: 189 LBS | BODY MASS INDEX: 35.71 KG/M2

## 2024-08-01 DIAGNOSIS — O03.9 MISCARRIAGE: Primary | ICD-10-CM

## 2024-08-01 PROCEDURE — 99213 OFFICE O/P EST LOW 20 MIN: CPT | Performed by: OBSTETRICS & GYNECOLOGY

## 2024-08-01 PROCEDURE — 76817 TRANSVAGINAL US OBSTETRIC: CPT | Performed by: OBSTETRICS & GYNECOLOGY

## 2024-08-01 NOTE — PROGRESS NOTES
[facility] [address], [address], [place] [address]    Assessment/Plan:  1. Miscarriage  Comments:  Ultrasound today confirms completed miscarriage  A/P:   Reviewed events with patient and .  Ultrasound today confirms completion of miscarriage.  She is feeling well.  Did not wish to conceive and does not plan to try currently.  Will use condoms.    Reviewed causes of early SAB w/ pt again.  Reassured early miscarriage can occur in 20-25% of pregnancies and is usually due chromosomal issues.  This does not increase risk of miscarriage in future.      Subjective:   Toshia Morel is a 39 y.o.  female.    HPI:   Toshia and her  present for follow up ultrasound for bleeding in early pregnancy.  She states after she was seen in the ER 2024 she had bleeding like a heavy period and is sure she miscarried.  The bleeding stopped yesterday.  No pain.    She was seen 2024 in office for evaluation of missed menses. She was breastfeeding. She reported positive pregnancy test on . Her last menstrual period is unknown, but she believed it was in May. Not using contraception but rarely sexually active due to 7 month old at home.  Ultrasound in office that day with + GS but no YS.  HCGs were ordered.    She presented to ER 2024 bleeding.  In the ER u/s showed +YS, but HCG not rising appropriately.  She was made aware possible evolving miscarriage.  She did have a repeat hcg 2 days later, increased but not appropriately for a normal pregnancy.  She was scheduled today for ultrasound follow up.    She reports after the ER visit heavy period bleeding.  Bleeding stopped yesterday.          Gyn History  Patient's last menstrual period was 2024 (approximate).       Last pap smear: 2021    She  reports being sexually active and has had partner(s) who are male. She reports using the following method of birth control/protection: None.       OB  History      Past Medical History:  No date: Asthma  No date: Endometriosis  No date: GERD (gastroesophageal reflux disease)  No date: Herpes  No date: IBS (irritable bowel syndrome)  No date: Lactose intolerance     Past Surgical History:  No date: APPENDECTOMY  No date: COLONOSCOPY  2013: CYSTECTOMY; Right      Comment:  right ovary  No date: EGD  No date: NASAL SEPTUM SURGERY  2023: [place]  DELIVERY ONLY; N/A      Comment:  Procedure:  SECTION ();  Surgeon:                Yamil Schmid MD;  Location: [facility];  Service:                Obstetrics     Social History     Tobacco Use    Smoking status: Never     Passive exposure: Never    Smokeless tobacco: Never   Vaping Use    Vaping status: Never Used   Substance Use Topics    Alcohol use: Not Currently     Comment: rare    Drug use: Never          Current Outpatient Medications:     Prenatal Vit-Fe Fumarate-FA (PRENATAL VITAMIN PO), Take by mouth, Disp: , Rfl:     valACYclovir (VALTREX) 500 mg tablet, Take 500 mg by mouth 2 (two) times a day, Disp: , Rfl:     She is allergic to amoxicillin and dicyclomine..    ROS: Review of Systems   Constitutional: Negative.    Gastrointestinal: Negative.    Genitourinary: Negative.    Psychiatric/Behavioral: Negative.         Objective:  /60   Wt 85.7 kg (189 lb)   LMP 2024 (Approximate)   BMI 35.71 kg/m²      Physical Exam  Constitutional:       Appearance: Normal appearance.   Neurological:      Mental Status: She is alert and oriented to person, place, and time.   Psychiatric:         Behavior: Behavior normal.         Pelvic ULTRASOUND  24    Aicha Link MD     INDICATION: bleeding in pregnancy    COMPARISON: 2024, 2024     TECHNIQUE:   Transvaginal imaging was performed to assess the gestation and myometrial/endometrial architecture..    The study includes volumetric sweeps and traditional still imaging technique.      FINDINGS:    No gestational sac  seen.  EMS trilaminar measuring 7.6mm.    UTERUS:   No uterine abnormalities noted.     IMPRESSION:    Confirmation of completion of miscarriage, as last u/s + GS and YS.

## 2024-08-05 ENCOUNTER — TELEPHONE (OUTPATIENT)
Dept: OBGYN CLINIC | Facility: CLINIC | Age: 39
End: 2024-08-05

## 2024-08-05 NOTE — TELEPHONE ENCOUNTER
Called patient to follow up after miscarriage (complete miscarriage confirmed while I was out of office on PTO). No answer. Left voicemail instructing patient to call back if she has any questions or concerns or would like to discuss further. Otherwise, follow up for routine care. Annual exam is scheduled for September.     Yamil Schmid MD  8/5/2024 12:37 PM

## 2024-08-27 ENCOUNTER — PATIENT MESSAGE (OUTPATIENT)
Dept: OBGYN CLINIC | Facility: CLINIC | Age: 39
End: 2024-08-27

## 2024-08-28 ENCOUNTER — TELEPHONE (OUTPATIENT)
Dept: OBGYN CLINIC | Facility: CLINIC | Age: 39
End: 2024-08-28

## 2024-08-28 DIAGNOSIS — F41.8 POSTPARTUM ANXIETY: Primary | ICD-10-CM

## 2024-08-28 PROBLEM — F41.9 ANXIETY: Status: ACTIVE | Noted: 2024-08-28

## 2024-08-28 NOTE — TELEPHONE ENCOUNTER
Returned patient call to discuss. She denies thoughts of self harm or harm to others. Increased anxiety in caring for infant. Coping overall well with therapy, but feels that she needs addition of medication. Will start Zoloft 25 mg PO daily x 7 days, then increase to 50 mg PO daily. Patient expressed appreciation. Will plan to reassess at time of upcoming annual exam next month. Instructed patient to call if worsening symptoms or other concerns prior.     Yamil Schmid MD  8/28/2024 3:20 PM

## 2024-08-28 NOTE — TELEPHONE ENCOUNTER
Patient returned phone call. She will be leaving for vacation soon, she would like to go ahead and discuss concerns with provider over the phone.

## 2024-08-28 NOTE — TELEPHONE ENCOUNTER
Attempted to reach patient in response to her [employer] message regarding depressive symptoms. She did not answer. I left a voicemail recommending that she schedule an office visit this week to discuss further. If this is logistically difficult, I am also available to discuss with her by phone and get her started on medication with plans for follow up in office thereafter in order to avoid a delay in care.     Yamil Schmid MD  8/28/2024 11:58 AM

## 2024-09-17 ENCOUNTER — TELEPHONE (OUTPATIENT)
Dept: OBGYN CLINIC | Facility: CLINIC | Age: 39
End: 2024-09-17

## 2024-09-26 DIAGNOSIS — K04.7 INFECTED TOOTH: Primary | ICD-10-CM

## 2024-09-26 RX ORDER — CLINDAMYCIN HCL 300 MG
300 CAPSULE ORAL 3 TIMES DAILY
Qty: 21 CAPSULE | Refills: 0 | Status: SHIPPED | OUTPATIENT
Start: 2024-09-26 | End: 2024-10-03

## 2024-09-27 ENCOUNTER — ANNUAL EXAM (OUTPATIENT)
Dept: OBGYN CLINIC | Facility: CLINIC | Age: 39
End: 2024-09-27
Payer: COMMERCIAL

## 2024-09-27 VITALS
BODY MASS INDEX: 36.21 KG/M2 | HEIGHT: 61 IN | DIASTOLIC BLOOD PRESSURE: 66 MMHG | SYSTOLIC BLOOD PRESSURE: 108 MMHG | WEIGHT: 191.8 LBS

## 2024-09-27 DIAGNOSIS — F41.9 ANXIETY: ICD-10-CM

## 2024-09-27 DIAGNOSIS — Z01.419 ENCOUNTER FOR ANNUAL ROUTINE GYNECOLOGICAL EXAMINATION: Primary | ICD-10-CM

## 2024-09-27 DIAGNOSIS — Z12.4 CERVICAL CANCER SCREENING: ICD-10-CM

## 2024-09-27 DIAGNOSIS — Z12.31 ENCOUNTER FOR SCREENING MAMMOGRAM FOR MALIGNANT NEOPLASM OF BREAST: ICD-10-CM

## 2024-09-27 PROCEDURE — 99396 PREV VISIT EST AGE 40-64: CPT | Performed by: OBSTETRICS & GYNECOLOGY

## 2024-09-27 NOTE — ASSESSMENT & PLAN NOTE
Had called late August with anxiety and Dr. Schmid prescribed Zoloft over the phone.  Toshia says she didn't start yet, just picked up (was away on vacation).  She plans to.  Also doing therapy which is helpful.  I told her if Zoloft helping and wishes to continue send me message after a month and I will refill for year if she wants.  She was initially not given a refill because she was going to follow up with me when she saw me today.  She agrees.

## 2024-09-27 NOTE — PROGRESS NOTES
Annual Wellness Visit  [facility] - [place]  [address], [address], [place] [address]    ASSESSMENT/PLAN: Toshia Morel is a 39 y.o.  who presents for annual gynecologic exam.    Encounter for routine gynecologic examination  - Routine well woman exam completed today.  - Cervical Cancer Screening: Current ASCCP Guidelines reviewed. Last Pap: 2021 normal. Past abnormal pap: none.  Next Pap Due: today.  - STI screening offered including HIV testing: offered, pt declined  - Contraceptive counseling discussed.  Current contraception: condoms,   - Breast Cancer Screening: Last Mammogram - not yet done  - The following were reviewed in today's visit: mammography screening ordered, exercise, and healthy diet    Additional problems addressed during this visit:  1. Encounter for annual routine gynecological examination  2. Encounter for screening mammogram for malignant neoplasm of breast  -     Mammo screening bilateral w 3d and cad; Future  3. Cervical cancer screening  -     IGP, Aptima HPV, Rfx 16/18,45  4. Anxiety  Assessment & Plan:  Had called late August with anxiety and Dr. Schmid prescribed Zoloft over the phone.  Toshia says she didn't start yet, just picked up (was away on vacation).  She plans to.  Also doing therapy which is helpful.  I told her if Zoloft helping and wishes to continue send me message after a month and I will refill for year if she wants.  She was initially not given a refill because she was going to follow up with me when she saw me today.  She agrees.      Next visit: 1 year Wellness          CC:  Annual Gynecologic Examination    HPI: Toshia Morel is a 39 y.o.  who presents for annual gynecologic examination.  She denies any breast, urinary or pelvic issues at today's visit.    Monthly periods, still nursing sometimes.  No issues.  Sexually active with , but currently abstaining for contraception.  Will use condoms.    Had called late  August with anxiety and Dr. Schmid prescribed Zoloft over the phone.  Toshia says she didn't start yet, just picked up (was away on vacation).  She plans to.  Also doing therapy which is helpful.        Gyn History  Patient's last menstrual period was 2024 (exact date).     Last Pap: 2021 was normal    She  reports being sexually active and has had partner(s) who are male. She reports using the following method of birth control/protection: None.       OB History      Past Medical History:  No date: Asthma  No date: Endometriosis  No date: GERD (gastroesophageal reflux disease)  No date: Herpes  No date: IBS (irritable bowel syndrome)  No date: Lactose intolerance     Past Surgical History:  No date: APPENDECTOMY  No date: COLONOSCOPY  2013: CYSTECTOMY; Right      Comment:  right ovary  No date: EGD  No date: NASAL SEPTUM SURGERY  2023: [place]  DELIVERY ONLY; N/A      Comment:  Procedure:  SECTION ();  Surgeon:                Yamil Schmid MD;  Location: [facility];  Service:                Obstetrics     Family History   Adopted: Yes   Problem Relation Age of Onset    Breast cancer Mother     Heart disease Father     Colon cancer Neg Hx         Social History     Tobacco Use    Smoking status: Never     Passive exposure: Never    Smokeless tobacco: Never   Vaping Use    Vaping status: Never Used   Substance Use Topics    Alcohol use: Not Currently     Comment: rare    Drug use: Never          Current Outpatient Medications:     clindamycin (CLEOCIN) 300 MG capsule, Take 1 capsule (300 mg total) by mouth 3 (three) times a day for 7 days (Patient not taking: Reported on 2024), Disp: 21 capsule, Rfl: 0    sertraline (Zoloft) 50 mg tablet, Take 0.5 tablets (25 mg total) by mouth daily for 7 days, THEN 1 tablet (50 mg total) daily. (Patient not taking: Reported on 2024), Disp: 83 tablet, Rfl: 0    valACYclovir (VALTREX) 500 mg tablet, Take 500 mg by mouth 2 (two)  "times a day (Patient not taking: Reported on 9/27/2024), Disp: , Rfl:     She is allergic to amoxicillin and dicyclomine..    ROS negative except as noted in HPI    Objective:  /66 (BP Location: Left arm, Patient Position: Sitting, Cuff Size: Large)   Ht 5' 1\" (1.549 m)   Wt 87 kg (191 lb 12.8 oz)   LMP 09/19/2024 (Exact Date)   Breastfeeding Yes   BMI 36.24 kg/m²      Physical Exam    "

## 2024-09-27 NOTE — LETTER
2024     Yg Mercer DO  [address]  [address] [place] [id number]    Patient: Toshia Morel   YOB: 1985   Date of Visit: 2024       Dear Dr. Mercer:    Thank you for referring Toshia Morel to me for evaluation. Below are my notes for this consultation.    If you have questions, please do not hesitate to call me. I look forward to following your patient along with you.         Sincerely,        Aicha Link MD        CC: No Recipients    Aicha Link MD  2024  4:45 PM  Sign when Signing Visit  Annual Wellness Visit  [facility] - [address], [address], [place] [address]    ASSESSMENT/PLAN: Toshia Morel is a 39 y.o.  who presents for annual gynecologic exam.    Encounter for routine gynecologic examination  - Routine well woman exam completed today.  - Cervical Cancer Screening: Current ASCCP Guidelines reviewed. Last Pap: 2021 normal. Past abnormal pap: none.  Next Pap Due: today.  - STI screening offered including HIV testing: offered, pt declined  - Contraceptive counseling discussed.  Current contraception: condoms,   - Breast Cancer Screening: Last Mammogram - not yet done  - The following were reviewed in today's visit: mammography screening ordered, exercise, and healthy diet    Additional problems addressed during this visit:  1. Encounter for annual routine gynecological examination  2. Encounter for screening mammogram for malignant neoplasm of breast  -     Mammo screening bilateral w 3d and cad; Future  3. Cervical cancer screening  -     IGP, Aptima HPV, Rfx 16/18,45  4. Anxiety  Assessment & Plan:  Had called late August with anxiety and Dr. Schmid prescribed Zoloft over the phone.  Toshia says she didn't start yet, just picked up (was away on vacation).  She plans to.  Also doing therapy which is helpful.  I told her if Zoloft helping and wishes to continue send me message after a month and I will  refill for year if she wants.  She was initially not given a refill because she was going to follow up with me when she saw me today.  She agrees.      Next visit: 1 year Wellness          CC:  Annual Gynecologic Examination    HPI: Toshia Morel is a 39 y.o.  who presents for annual gynecologic examination.  She denies any breast, urinary or pelvic issues at today's visit.    Monthly periods, still nursing sometimes.  No issues.  Sexually active with , but currently abstaining for contraception.  Will use condoms.    Had called late August with anxiety and Dr. Schmid prescribed Zoloft over the phone.  Toshia says she didn't start yet, just picked up (was away on vacation).  She plans to.  Also doing therapy which is helpful.        Gyn History  Patient's last menstrual period was 2024 (exact date).     Last Pap: 2021 was normal    She  reports being sexually active and has had partner(s) who are male. She reports using the following method of birth control/protection: None.       OB History      Past Medical History:  No date: Asthma  No date: Endometriosis  No date: GERD (gastroesophageal reflux disease)  No date: Herpes  No date: IBS (irritable bowel syndrome)  No date: Lactose intolerance     Past Surgical History:  No date: APPENDECTOMY  No date: COLONOSCOPY  : CYSTECTOMY; Right      Comment:  right ovary  No date: EGD  No date: NASAL SEPTUM SURGERY  2023: [place]  DELIVERY ONLY; N/A      Comment:  Procedure:  SECTION ();  Surgeon:                Yamil Schmid MD;  Location: [facility];  Service:                Obstetrics     Family History   Adopted: Yes   Problem Relation Age of Onset   • Breast cancer Mother    • Heart disease Father    • Colon cancer Neg Hx         Social History     Tobacco Use   • Smoking status: Never     Passive exposure: Never   • Smokeless tobacco: Never   Vaping Use   • Vaping status: Never Used   Substance Use Topics  "  • Alcohol use: Not Currently     Comment: rare   • Drug use: Never          Current Outpatient Medications:   •  clindamycin (CLEOCIN) 300 MG capsule, Take 1 capsule (300 mg total) by mouth 3 (three) times a day for 7 days (Patient not taking: Reported on 9/27/2024), Disp: 21 capsule, Rfl: 0  •  sertraline (Zoloft) 50 mg tablet, Take 0.5 tablets (25 mg total) by mouth daily for 7 days, THEN 1 tablet (50 mg total) daily. (Patient not taking: Reported on 9/27/2024), Disp: 83 tablet, Rfl: 0  •  valACYclovir (VALTREX) 500 mg tablet, Take 500 mg by mouth 2 (two) times a day (Patient not taking: Reported on 9/27/2024), Disp: , Rfl:     She is allergic to amoxicillin and dicyclomine..    ROS negative except as noted in HPI    Objective:  /66 (BP Location: Left arm, Patient Position: Sitting, Cuff Size: Large)   Ht 5' 1\" (1.549 m)   Wt 87 kg (191 lb 12.8 oz)   LMP 09/19/2024 (Exact Date)   Breastfeeding Yes   BMI 36.24 kg/m²      Physical Exam    "

## 2024-10-03 LAB
CYTOLOGIST CVX/VAG CYTO: NORMAL
DX ICD CODE: NORMAL
HPV GENOTYPE REFLEX: NORMAL
HPV I/H RISK 4 DNA CVX QL PROBE+SIG AMP: NEGATIVE
OTHER STN SPEC: NORMAL
PATH REPORT.FINAL DX SPEC: NORMAL
SL AMB NOTE:: NORMAL
SL AMB SPECIMEN ADEQUACY: NORMAL
SL AMB TEST METHODOLOGY: NORMAL

## 2024-10-23 ENCOUNTER — PATIENT MESSAGE (OUTPATIENT)
Dept: OBGYN CLINIC | Facility: CLINIC | Age: 39
End: 2024-10-23

## 2024-10-24 ENCOUNTER — TELEPHONE (OUTPATIENT)
Age: 39
End: 2024-10-24

## 2024-10-24 NOTE — TELEPHONE ENCOUNTER
"RN, please see the thread in patient MYC msg encounter to determine if script is appropriate for mastitis (vs clogged duct)    \"Left side. It’s pretty red and warm. Still sore but better with some pumping. On and off body aches, etc. no fever thankfully. But it’s gotten redder and warmer since yesterday. \"    Thank you   "

## 2024-10-25 ENCOUNTER — NURSE TRIAGE (OUTPATIENT)
Age: 39
End: 2024-10-25

## 2024-10-25 NOTE — TELEPHONE ENCOUNTER
"Patient called office stating she started with symptoms of what she thicks could be mastitis on Wednesday of this week. She started with feeling sick, achey, mild fever, chills, left breast small lump (possible clogged duct she states), redness of breast, warmth, soreness, redness, red streak, engorgement, white nipple discharge/crust. She has been weaning her baby who delivered 23 from breastfeed and pumping occasionally. She started icing the breast Wednesday and taking Ibuprofen at night. She states symptoms are improving and she only has the lump, soreness, she rates a 4, redness, red streak, warmth and nipple crust. Routing to provider for advice if appointment is needed or medication should be ordered at this time. Advised patient to call back if fever develops or symptoms worsen.           Answer Assessment - Initial Assessment Questions  1. SYMPTOM: \"What's the main symptom you're concerned about?\" (e.g., pain, redness, swelling)      Redness, warmth and sore, red streak, lump (clogged duct)    2. ONSET: \"When did the  symptoms  start?\"      Wednesday night   3. LOCATION: \"Which breast?\" (e.g., left, right, both) \"Is the pain in the breast or just the nipple?\"      Left breast   4. PAIN: \"How bad is the pain?\"  (Scale 1-10; or mild, moderate, severe)      4  5. REDNESS: \"Does the skin appear red? Does the breast feel hot to touch?\"       Yes   6. LUMP OR SWELLING: \"Does the breast feel hard or have lumps?\"      lump  7. DELIVERY DATE: \"When was your delivery date?\" \"Vaginal delivery or ?\"      23  8. BREASTFEEDING AND PUMPING: \"Did you breastfeed your baby or use a breast pump after delivery?\" If Yes, ask: \"When did you last breastfeed or pump your breasts?\"      Weaning - breastfeeding / pumping occasionally recently   9. FEVER: \"Do you have a fever?\" If Yes, ask: \"What is it, how was it measured, and when did it start?\"      No   10. OTHER SYMPTOMS: \"Do you have any other symptoms?\" " (e.g., feeling sad or depressed, nipple symptoms)       White Nipple discharge/ crust    Protocols used: Postpartum - Breast Pain and Engorgement-Adult-[place]

## 2024-10-29 NOTE — TELEPHONE ENCOUNTER
I was out last week and just seeing this message.  I don't see that is was addressed (not sure why it wasn't forwarded to doctor covering me).    Please call pt and apologize for delay in response.  Please ask about current symptoms and return to me to assess.  Again my apologies.

## 2024-11-22 ENCOUNTER — NURSE TRIAGE (OUTPATIENT)
Age: 39
End: 2024-11-22

## 2024-11-22 DIAGNOSIS — N89.8 VAGINAL ITCHING: Primary | ICD-10-CM

## 2024-11-22 RX ORDER — FLUCONAZOLE 150 MG/1
150 TABLET ORAL DAILY
Qty: 1 TABLET | Refills: 0 | Status: SHIPPED | OUTPATIENT
Start: 2024-11-22 | End: 2024-11-23

## 2024-11-22 NOTE — TELEPHONE ENCOUNTER
"Incoming call from patient. States that since earlier in the week she has been experiencing vaginal soreness, itching and and increase in vaginal discharge. Denies odor. States that this feels like prior yeast infection. Diflucan x1 sent in to pharmacy on file. Patient informed to contact office if symptoms persist after treatment. Patient verbalized understanding.     \"How many yeast infections have you had in the past 2 years?\"    -If 1 or less, prescribe Diflucan 150mg PO. If no improvement after 1 dose treatment, please instruct patient to call back to schedule appointment. (should be seen within 3 days)    -If more than 4 or more yeast infections in a year or if they are recurrent, schedule appointment within 3 days.    For OB patients: if patient wishes to use over the counter monistat, recommend only the 7 day treatment.     Reason for Disposition   Symptoms of a yeast infection (i.e., itchy, white discharge, not bad smelling) and feels like prior vaginal yeast infections    Answer Assessment - Initial Assessment Questions  1. SYMPTOM: \"What's the main symptom you're concerned about?\" (e.g., pain, itching, dryness)      Vaginal soreness, itching, increase in vaginal discharge. Denies odor.  2. LOCATION: \"Where is the  symptoms located?\" (e.g., inside/outside, left/right)      vagina  3. ONSET: \"When did the  symptoms  start?\"      Earlier in the week   4. PAIN: \"Is there any pain?\" If Yes, ask: \"How bad is it?\" (Scale: 1-10; mild, moderate, severe)      soreness  5. ITCHING: \"Is there any itching?\" If Yes, ask: \"How bad is it?\" (Scale: 1-10; mild, moderate, severe)      itching  6. CAUSE: \"What do you think is causing the discharge?\" \"Have you had the same problem before?\" \"What happened then?\"      denies  7. OTHER SYMPTOMS: \"Do you have any other symptoms?\" (e.g., fever, itching, vaginal bleeding, pain with urination, injury to genital area, vaginal foreign body)      denies  8. PREGNANCY: \"Is there any " "chance you are pregnant?\" \"When was your last menstrual period?\"      Lmp 11/13/24    Protocols used: Vaginal Symptoms-Adult-[place]    "

## 2024-11-25 ENCOUNTER — OFFICE VISIT (OUTPATIENT)
Dept: OBGYN CLINIC | Facility: CLINIC | Age: 39
End: 2024-11-25
Payer: COMMERCIAL

## 2024-11-25 VITALS
DIASTOLIC BLOOD PRESSURE: 62 MMHG | WEIGHT: 188 LBS | BODY MASS INDEX: 35.5 KG/M2 | HEIGHT: 61 IN | SYSTOLIC BLOOD PRESSURE: 106 MMHG

## 2024-11-25 DIAGNOSIS — N89.8 VAGINAL ITCHING: Primary | ICD-10-CM

## 2024-11-25 DIAGNOSIS — B37.31 VAGINAL YEAST INFECTION: ICD-10-CM

## 2024-11-25 LAB
BV WHIFF TEST VAG QL: ABNORMAL
CLUE CELLS SPEC QL WET PREP: ABNORMAL
PH SMN: 4.5 [PH]
SL AMB POCT WET MOUNT: ABNORMAL
T VAGINALIS VAG QL WET PREP: ABNORMAL
YEAST VAG QL WET PREP: ABNORMAL

## 2024-11-25 PROCEDURE — 87210 SMEAR WET MOUNT SALINE/INK: CPT | Performed by: NURSE PRACTITIONER

## 2024-11-25 PROCEDURE — 99213 OFFICE O/P EST LOW 20 MIN: CPT | Performed by: NURSE PRACTITIONER

## 2024-11-25 RX ORDER — FLUCONAZOLE 150 MG/1
150 TABLET ORAL ONCE
Qty: 2 TABLET | Refills: 1 | Status: SHIPPED | OUTPATIENT
Start: 2024-11-25 | End: 2024-11-25

## 2024-11-25 NOTE — PROGRESS NOTES
"Assessment/Plan:  Small amount yeast on wet mount Diflucan RX sent Open to air at bedtime. Cotton underwear. No thongs. Wear looser fitting clothing. Get out of exercise clothes and bathing suits ASAP. Avoid bathroom wipes, feminine washes/scented soaps. Wash with cetaphil cleanser. Watch sugar and carb intake. Refrain from sexual activity while on treatment.          Diagnoses and all orders for this visit:    Vaginal itching  -     POCT wet mount    Vaginal yeast infection  -     fluconazole (DIFLUCAN) 150 mg tablet; Take 1 tablet (150 mg total) by mouth once for 1 dose Repeat In 3 days if needed          Subjective:      Patient ID: Toshia Morel is a 39 y.o. female.    Here for eval vaginal itching, soreness dicharge No odor Had taken Diflucan 150mg x 1 11/22/2024 improving but still not better LMP 11/16/2024 H/o HSV Valtrex prn No recent outbreaks         The following portions of the patient's history were reviewed and updated as appropriate: allergies, current medications, past family history, past medical history, past social history, past surgical history, and problem list.    Review of Systems   Constitutional:  Negative for chills and fever.   Genitourinary:  Positive for vaginal discharge. Negative for dyspareunia, genital sores and pelvic pain.        Vaginal itching         Objective:      /62 (BP Location: Right arm, Patient Position: Sitting, Cuff Size: Standard)   Ht 5' 1\" (1.549 m)   Wt 85.3 kg (188 lb)   LMP 11/16/2024   Breastfeeding Yes   BMI 35.52 kg/m²          Physical Exam  Vitals and nursing note reviewed.   Constitutional:       General: She is not in acute distress.     Appearance: Normal appearance.   HENT:      Head: Normocephalic and atraumatic.   Abdominal:      General: There is no distension.      Palpations: There is no mass.      Tenderness: There is no abdominal tenderness.   Genitourinary:     Exam position: Lithotomy position.      Labia:         Right: No rash or " lesion.         Left: No rash or lesion.       Vagina: Vaginal discharge and erythema present.      Cervix: No cervical motion tenderness, discharge, lesion or cervical bleeding.      Uterus: Normal.    Lymphadenopathy:      Lower Body: No right inguinal adenopathy. No left inguinal adenopathy.   Skin:     General: Skin is warm and dry.   Neurological:      General: No focal deficit present.      Mental Status: She is alert and oriented to person, place, and time.   Psychiatric:         Mood and Affect: Mood normal.         Behavior: Behavior normal.         Thought Content: Thought content normal.

## 2024-11-25 NOTE — PATIENT INSTRUCTIONS
Small amount yeast on wet mount Diflucan RX sent Open to air at bedtime. Cotton underwear. No thongs. Wear looser fitting clothing. Get out of exercise clothes and bathing suits ASAP. Avoid bathroom wipes, feminine washes/scented soaps. Wash with cetaphil cleanser. Watch sugar and carb intake. Refrain from sexual activity while on treatment.

## 2024-12-02 DIAGNOSIS — N76.2 ACUTE VULVITIS: Primary | ICD-10-CM

## 2024-12-02 RX ORDER — CLOTRIMAZOLE AND BETAMETHASONE DIPROPIONATE 10; .64 MG/G; MG/G
CREAM TOPICAL 2 TIMES DAILY
Qty: 30 G | Refills: 1 | Status: SHIPPED | OUTPATIENT
Start: 2024-12-02 | End: 2024-12-12

## 2024-12-16 DIAGNOSIS — B37.31 VAGINAL YEAST INFECTION: Primary | ICD-10-CM

## 2024-12-16 RX ORDER — TERCONAZOLE 4 MG/G
1 CREAM VAGINAL
Qty: 45 G | Refills: 0 | Status: SHIPPED | OUTPATIENT
Start: 2024-12-16 | End: 2024-12-23

## 2025-03-05 NOTE — PROGRESS NOTES
Patient ID: Toshia Morel is a 39 y.o. female Date of Birth 1985       Chief Complaint   Patient presents with    New Patient Visit    Nail Problem     Left - nail fungus              Diagnosis:  1. Onychomycosis  -     ciclopirox (PENLAC) 8 % solution; Apply topically daily at bedtime    Initial pedal examination with socks and shoes removed bilaterally.  Today we discussed the etiology and treatment options of onychomycosis, in the past patient has successfully used over-the-counter topical antifungal with some improvement.  At this time we have deferred oral antifungal as patient is considering having another baby and getting pregnant.  She was prescribed ciclopirox, she will apply this nightly, weekly she will use alcohol swab or nail polish remover and remove the buildup, use emery board to buff her nail.  Patient understands and agrees with plan will follow-up in 6 months.          Subjective:   3/6/25 -Toshia presents today for initial pedal examination, evaluation and care of dark spot under her toenail, she is concerned she might have fungus.  She states she has used topical antifungal in the past, had her nails almost looking perfect and then after she had her baby 14 months ago they went haywire again.        The following portions of the patient's history were reviewed and updated as appropriate:     Past Medical History:   Diagnosis Date    Asthma     Endometriosis     GERD (gastroesophageal reflux disease)     Herpes     IBS (irritable bowel syndrome)     Lactose intolerance        Past Surgical History:   Procedure Laterality Date    APPENDECTOMY      COLONOSCOPY      CYSTECTOMY Right     right ovary    EGD      NASAL SEPTUM SURGERY      [place]  DELIVERY ONLY N/A 2023    Procedure:  SECTION ();  Surgeon: Yamil Schmid MD;  Location: [facility];  Service: Obstetrics       Social History     Socioeconomic History    Marital status: Registered Domestic Partner      Spouse name: None    Number of children: None    Years of education: None    Highest education level: None   Occupational History    None   Tobacco Use    Smoking status: Never     Passive exposure: Never    Smokeless tobacco: Never   Vaping Use    Vaping status: Never Used   Substance and Sexual Activity    Alcohol use: Not Currently     Comment: rare    Drug use: Never    Sexual activity: Yes     Partners: Male     Birth control/protection: None   Other Topics Concern    None   Social History Narrative    None     Social Drivers of Health     Financial Resource Strain: Not on file   Food Insecurity: No Food Insecurity (5/4/2023)    [facility]    Hunger Vital Sign     Worried About Running Out of Food in the Last Year: Never true     Ran Out of Food in the Last Year: Never true   Transportation Needs: No Transportation Needs (5/4/2023)    Received from [facility]    PRAPARE - Transportation     Lack of Transportation (Medical): No     Lack of Transportation (Non-Medical): No   Physical Activity: Not on file   Stress: Not on file   Social Connections: Not on file   Intimate Partner Violence: Not on file   Housing Stability: Unknown (5/4/2023)    Received from [facility], [facility]    Housing Stability Vital Sign     Unable to Pay for Housing in the Last Year: No     Number of Places Lived in the Last Year: Not on file     Unstable Housing in the Last Year: No          Current Outpatient Medications:     ciclopirox (PENLAC) 8 % solution, Apply topically daily at bedtime, Disp: 6 mL, Rfl: 6    clotrimazole-betamethasone (LOTRISONE) 1-0.05 % cream, Apply topically 2 (two) times a day for 10 days, Disp: 30 g, Rfl: 1    sertraline (Zoloft) 50 mg tablet, Take 0.5 tablets (25 mg total) by mouth daily for 7 days, THEN 1 tablet (50 mg total) daily. (Patient not taking: No  "sig reported), Disp: 83 tablet, Rfl: 0    valACYclovir (VALTREX) 500 mg tablet, Take 500 mg by mouth 2 (two) times a day (Patient not taking: Reported on 3/3/2025), Disp: , Rfl:     Allergies  Amoxicillin and Dicyclomine    Family History   Adopted: Yes   Problem Relation Age of Onset    Breast cancer Mother     Heart disease Father     Colon cancer Neg Hx            Objective:  Ht 5' 1\" (1.549 m) Comment: verbal  Wt 85.3 kg (188 lb)   BMI 35.52 kg/m²     Review of Systems   Constitutional:  Negative for chills and fever.   HENT:  Negative for ear pain and sore throat.    Eyes:  Negative for pain and visual disturbance.   Respiratory:  Negative for cough and shortness of breath.    Cardiovascular:  Negative for chest pain and palpitations.   Gastrointestinal:  Negative for abdominal pain and vomiting.   Genitourinary:  Negative for dysuria and hematuria.   Musculoskeletal:  Negative for arthralgias and back pain.   Skin:  Negative for color change and rash.        Toenail abnormalities   Neurological:  Negative for seizures and syncope.   All other systems reviewed and are negative.      Physical Exam  Constitutional:       Appearance: Normal appearance. She is well-developed. She is obese.   HENT:      Head: Normocephalic and atraumatic.      Nose: Nose normal.      Mouth/Throat:      Mouth: Mucous membranes are moist.      Pharynx: Oropharynx is clear.   Eyes:      Conjunctiva/sclera: Conjunctivae normal.      Pupils: Pupils are equal, round, and reactive to light.   Cardiovascular:      Pulses:           Dorsalis pedis pulses are 2+ on the right side and 2+ on the left side.        Posterior tibial pulses are 2+ on the right side and 2+ on the left side.   Pulmonary:      Effort: Pulmonary effort is normal.   Musculoskeletal:         General: Normal range of motion.      Cervical back: Normal range of motion.      Right lower leg: No edema.      Left lower leg: No edema.   Feet:      Right foot:      Protective " "Sensation: 10 sites tested.  10 sites sensed.      Skin integrity: Skin integrity normal.      Left foot:      Protective Sensation: 10 sites tested.  10 sites sensed.      Skin integrity: Skin integrity normal.      Comments: Left foot toenails 1 through 5 with onychomycoses, subungual debris, discoloration.  Right foot toenails are within normal limits.  Skin:     General: Skin is warm and dry.      Capillary Refill: Capillary refill takes less than 2 seconds.   Neurological:      General: No focal deficit present.      Mental Status: She is alert and oriented to person, place, and time. Mental status is at baseline.   Psychiatric:         Mood and Affect: Mood normal.         Behavior: Behavior normal.         Thought Content: Thought content normal.         Judgment: Judgment normal.           No pertinent results found.      Toshia Garcia DPM, DPM, FACFAS    Portions of the record may have been created with voice recognition software. Occasional wrong word or \"sound a like\" substitutions may have occurred due to the inherent limitations of voice recognition software. Read the chart carefully and recognize, using context, where substitutions have occurred.  "

## 2025-03-06 ENCOUNTER — OFFICE VISIT (OUTPATIENT)
Dept: PODIATRY | Facility: CLINIC | Age: 40
End: 2025-03-06
Payer: COMMERCIAL

## 2025-03-06 VITALS — WEIGHT: 188 LBS | HEIGHT: 61 IN | BODY MASS INDEX: 35.5 KG/M2

## 2025-03-06 DIAGNOSIS — B35.1 ONYCHOMYCOSIS: Primary | ICD-10-CM

## 2025-03-06 PROCEDURE — 99203 OFFICE O/P NEW LOW 30 MIN: CPT | Performed by: PODIATRIST

## 2025-03-06 RX ORDER — CICLOPIROX 80 MG/ML
SOLUTION TOPICAL
Qty: 6 ML | Refills: 6 | Status: SHIPPED | OUTPATIENT
Start: 2025-03-06

## 2025-04-02 ENCOUNTER — HOSPITAL ENCOUNTER (OUTPATIENT)
Dept: MAMMOGRAPHY | Facility: CLINIC | Age: 40
Discharge: HOME | End: 2025-04-02
Payer: COMMERCIAL

## 2025-04-02 VITALS — HEIGHT: 61 IN | BODY MASS INDEX: 35.5 KG/M2 | WEIGHT: 188 LBS

## 2025-04-02 DIAGNOSIS — Z12.31 ENCOUNTER FOR SCREENING MAMMOGRAM FOR MALIGNANT NEOPLASM OF BREAST: ICD-10-CM

## 2025-04-02 PROCEDURE — 77067 SCR MAMMO BI INCL CAD: CPT

## 2025-04-02 PROCEDURE — 77063 BREAST TOMOSYNTHESIS BI: CPT

## 2025-04-06 ENCOUNTER — RESULTS FOLLOW-UP (OUTPATIENT)
Dept: OBGYN CLINIC | Facility: CLINIC | Age: 40
End: 2025-04-06

## 2025-04-10 ENCOUNTER — OFFICE VISIT (OUTPATIENT)
Dept: OBGYN CLINIC | Facility: CLINIC | Age: 40
End: 2025-04-10
Payer: COMMERCIAL

## 2025-04-10 VITALS
DIASTOLIC BLOOD PRESSURE: 74 MMHG | HEIGHT: 61 IN | BODY MASS INDEX: 35.5 KG/M2 | SYSTOLIC BLOOD PRESSURE: 118 MMHG | WEIGHT: 188 LBS

## 2025-04-10 DIAGNOSIS — N76.2 ACUTE VULVITIS: ICD-10-CM

## 2025-04-10 DIAGNOSIS — N89.8 VAGINAL ITCHING: ICD-10-CM

## 2025-04-10 DIAGNOSIS — F41.9 ANXIETY: ICD-10-CM

## 2025-04-10 DIAGNOSIS — R10.2 PELVIC PAIN IN FEMALE: ICD-10-CM

## 2025-04-10 DIAGNOSIS — N76.3 CHRONIC VULVITIS: ICD-10-CM

## 2025-04-10 DIAGNOSIS — R63.5 WEIGHT GAIN: ICD-10-CM

## 2025-04-10 DIAGNOSIS — Z87.19 HISTORY OF IBS: ICD-10-CM

## 2025-04-10 DIAGNOSIS — N92.6 IRREGULAR MENSES: Primary | ICD-10-CM

## 2025-04-10 DIAGNOSIS — R45.86 MOOD SWINGS: ICD-10-CM

## 2025-04-10 DIAGNOSIS — L68.0 HIRSUTISM: ICD-10-CM

## 2025-04-10 DIAGNOSIS — N80.9 ENDOMETRIOSIS: ICD-10-CM

## 2025-04-10 DIAGNOSIS — A60.9 HSV (HERPES SIMPLEX VIRUS) ANOGENITAL INFECTION: ICD-10-CM

## 2025-04-10 LAB
BV WHIFF TEST VAG QL: NORMAL
CLUE CELLS SPEC QL WET PREP: NORMAL
PH SMN: 4 [PH]
SL AMB POCT WET MOUNT: NORMAL
T VAGINALIS VAG QL WET PREP: NORMAL
YEAST VAG QL WET PREP: NORMAL

## 2025-04-10 PROCEDURE — 99214 OFFICE O/P EST MOD 30 MIN: CPT | Performed by: OBSTETRICS & GYNECOLOGY

## 2025-04-10 PROCEDURE — 87210 SMEAR WET MOUNT SALINE/INK: CPT | Performed by: OBSTETRICS & GYNECOLOGY

## 2025-04-10 RX ORDER — VALACYCLOVIR HYDROCHLORIDE 500 MG/1
500 TABLET, FILM COATED ORAL DAILY
Qty: 90 TABLET | Refills: 0 | Status: SHIPPED | OUTPATIENT
Start: 2025-04-10 | End: 2025-07-09

## 2025-04-10 NOTE — PROGRESS NOTES
PROBLEM GYNECOLOGICAL VISIT    Toshia Morel is a 39 y.o. female who presents today with complaint of low libido, pelvic pain, itching  and  discharge     ant and  external area   facial hair,   .  Her general medical history has been reviewed and she reports it as follows:    Past Medical History:   Diagnosis Date   • Asthma    • Endometriosis    • GERD (gastroesophageal reflux disease)    • Herpes    • IBS (irritable bowel syndrome)    • Lactose intolerance      Past Surgical History:   Procedure Laterality Date   • APPENDECTOMY     • COLONOSCOPY     • CYSTECTOMY Right     right ovary   • EGD     • NASAL SEPTUM SURGERY     • [place]  DELIVERY ONLY N/A 2023    Procedure:  SECTION ();  Surgeon: Yamil Schmid MD;  Location: [facility];  Service: Obstetrics     OB History        2    Para   1    Term   1       0    AB   1    Living   1       SAB   1    IAB   0    Ectopic   0    Multiple   0    Live Births   1               Social History     Tobacco Use   • Smoking status: Never     Passive exposure: Never   • Smokeless tobacco: Never   Vaping Use   • Vaping status: Never Used   Substance Use Topics   • Alcohol use: Not Currently     Comment: rare   • Drug use: Never       Current Outpatient Medications   Medication Instructions   • ciclopirox (PENLAC) 8 % solution Topical, Daily at bedtime   • clotrimazole-betamethasone (LOTRISONE) 1-0.05 % cream Topical, 2 times daily   • sertraline (Zoloft) 50 mg tablet Take 0.5 tablets (25 mg total) by mouth daily for 7 days, THEN 1 tablet (50 mg total) daily.   • valACYclovir (VALTREX) 500 mg, Oral, Daily       History of Present Illness:   #yo son and stopped  breast feeding  and has had all kinds of  fatigue, low libido vaginal itching inside and out   extending back.   Heacaches  ,  cyslc being very painful  .  Would like to get pregnant.  No hx of  problems getting pregnant.  Mood can   go up and down.  Lynnette gain    Review of  "Systems:  Review of Systems   Constitutional:  Positive for fatigue and unexpected weight change.   Gastrointestinal:  Positive for abdominal distention, abdominal pain and diarrhea.   Endocrine: Negative.    Genitourinary:  Positive for dyspareunia, frequency, menstrual problem, pelvic pain and vaginal discharge. Negative for dysuria and vaginal pain.   Skin: Negative.    Neurological:  Positive for headaches.   Hematological: Negative.    Psychiatric/Behavioral: Negative.         Physical Exam:  /74 (BP Location: Right arm, Patient Position: Sitting, Cuff Size: Standard)   Ht 5' 1\" (1.549 m)   Wt 85.3 kg (188 lb)   LMP 03/18/2025   BMI 35.52 kg/m²   Physical Exam  Constitutional:       Appearance: Normal appearance. She is normal weight.   Genitourinary:      Vulva, bladder and rectum normal.      No lesions in the vagina.      Genitourinary Comments: White dc         Right Labia: No rash, tenderness, lesions or skin changes.     Left Labia: No tenderness, lesions, skin changes or rash.     No labial fusion noted.      No inguinal adenopathy present in the right or left side.     No vaginal discharge, erythema, tenderness or bleeding.      No vaginal prolapse present.       Right Adnexa: not tender, not full, not palpable and no mass present.     Left Adnexa: not tender, not full, not palpable and no mass present.     Cervix is not nulliparous or parous.      No cervical motion tenderness, discharge, friability, lesion, polyp, nabothian cyst, eversion or elongation.      No parametrium thickening present.     Uterus is not enlarged, fixed, tender or prolapsed.      No uterine mass detected.     No urethral prolapse, tenderness, mass, hypermobility or discharge present.      Pelvic floor neuro is intact.     Pelvic exam was performed with patient in the lithotomy position and normal support.   Abdominal:      General: Abdomen is flat. Bowel sounds are normal. There is no distension.      Palpations: " Abdomen is soft. There is no splenomegaly or mass.      Tenderness: There is no abdominal tenderness. There is no right CVA tenderness, left CVA tenderness, guarding or rebound.      Hernia: There is no hernia in the left inguinal area or right inguinal area.   Musculoskeletal:         General: Normal range of motion.      Cervical back: Normal range of motion and neck supple.   Lymphadenopathy:      Lower Body: No right inguinal adenopathy. No left inguinal adenopathy.   Neurological:      Mental Status: She is alert and oriented to person, place, and time.   Skin:     General: Skin is warm and dry.   Psychiatric:         Mood and Affect: Mood normal.         Thought Content: Thought content normal.         Judgment: Judgment normal.   Vitals and nursing note reviewed.       Point of Care Testing:   -Wet mount:  neg   -KOH mount:neg    -Whiff:  neg    -urine pregnancy test: na    -urinalysis: neg     Assessment:   1. Vaginitis,  Hx of hsv,   wt gain, low libido , irregular menses , hirsutism  , vulvitis  pelvic pain , hx of endometriosis ,  frequency of urination     Plan:   Wet mount done  . Open to air .  No underwear to bed a night.  Plain water to wash area.   External you can use  Aquaphor Healing ointment or  A+D ointment.  If you buy any over the counter medication make sure it is the  Monistat  7.  Limit your sugar intake.  Complete all medications !  Nuswab done  I have spent a total time of 30 minutes in caring for this patient on the day of the visit/encounter including Prognosis, Risks and benefits of tx options, Instructions for management, Patient and family education, Importance of tx compliance, Risk factor reductions, Impressions, Counseling / Coordination of care, Documenting in the medical record, Reviewing/placing orders in the medical record (including tests, medications, and/or procedures), and Obtaining or reviewing history  .   Valtrex  500 mg daily stat today   dw pt need to suppress the    virus.     Low libido  wt gain irregular menses   PCOS labs ordered   . Day 3   TV US to assess the uterus and ovaries.   Fu after results       Reviewed with patient that test results are available in [facility] immediately, but that they will not necessarily be reviewed by me immediately.  Explained that I will review results at my earliest opportunity and contact patient appropriately.

## 2025-04-12 LAB
A VAGINAE DNA VAG QL NAA+PROBE: NORMAL SCORE
BVAB2 DNA VAG QL NAA+PROBE: NORMAL SCORE
C ALBICANS DNA VAG QL NAA+PROBE: NEGATIVE
C GLABRATA DNA VAG QL NAA+PROBE: NEGATIVE
C TRACH RRNA SPEC QL NAA+PROBE: NEGATIVE
MEGA1 DNA VAG QL NAA+PROBE: NORMAL SCORE
N GONORRHOEA RRNA SPEC QL NAA+PROBE: NEGATIVE
T VAGINALIS RRNA SPEC QL NAA+PROBE: NEGATIVE

## 2025-04-13 ENCOUNTER — RESULTS FOLLOW-UP (OUTPATIENT)
Dept: OBGYN CLINIC | Facility: CLINIC | Age: 40
End: 2025-04-13

## 2025-04-27 LAB
17OHP SERPL-MCNC: 15 NG/DL
BASOPHILS # BLD AUTO: 0 X10E3/UL (ref 0–0.2)
BASOPHILS NFR BLD AUTO: 1 %
DHEA-S SERPL-MCNC: 161 UG/DL (ref 57.3–279.2)
EOSINOPHIL # BLD AUTO: 0.1 X10E3/UL (ref 0–0.4)
EOSINOPHIL NFR BLD AUTO: 2 %
ERYTHROCYTE [DISTWIDTH] IN BLOOD BY AUTOMATED COUNT: 12.5 % (ref 11.7–15.4)
ESTRADIOL SERPL-MCNC: 35.1 PG/ML
FSH SERPL-ACNC: 20 MIU/ML
HCT VFR BLD AUTO: 42.4 % (ref 34–46.6)
HGB BLD-MCNC: 14.4 G/DL (ref 11.1–15.9)
IMM GRANULOCYTES # BLD: 0 X10E3/UL (ref 0–0.1)
IMM GRANULOCYTES NFR BLD: 0 %
LH SERPL-ACNC: 13.3 MIU/ML
LYMPHOCYTES # BLD AUTO: 1.6 X10E3/UL (ref 0.7–3.1)
LYMPHOCYTES NFR BLD AUTO: 25 %
MCH RBC QN AUTO: 29.4 PG (ref 26.6–33)
MCHC RBC AUTO-ENTMCNC: 34 G/DL (ref 31.5–35.7)
MCV RBC AUTO: 87 FL (ref 79–97)
MONOCYTES # BLD AUTO: 0.6 X10E3/UL (ref 0.1–0.9)
MONOCYTES NFR BLD AUTO: 9 %
NEUTROPHILS # BLD AUTO: 3.9 X10E3/UL (ref 1.4–7)
NEUTROPHILS NFR BLD AUTO: 63 %
PLATELET # BLD AUTO: 227 X10E3/UL (ref 150–450)
RBC # BLD AUTO: 4.89 X10E6/UL (ref 3.77–5.28)
TESTOST FREE SERPL-MCNC: 1.1 PG/ML (ref 0–4.2)
TESTOST SERPL-MCNC: 17 NG/DL (ref 4–50)
TSH SERPL DL<=0.005 MIU/L-ACNC: 1.02 UIU/ML (ref 0.45–4.5)
WBC # BLD AUTO: 6.2 X10E3/UL (ref 3.4–10.8)

## 2025-06-25 ENCOUNTER — OFFICE VISIT (OUTPATIENT)
Dept: FAMILY MEDICINE CLINIC | Facility: CLINIC | Age: 40
End: 2025-06-25
Payer: COMMERCIAL

## 2025-06-25 VITALS
HEART RATE: 89 BPM | BODY MASS INDEX: 35.53 KG/M2 | WEIGHT: 188.2 LBS | RESPIRATION RATE: 16 BRPM | SYSTOLIC BLOOD PRESSURE: 120 MMHG | HEIGHT: 61 IN | TEMPERATURE: 97.6 F | DIASTOLIC BLOOD PRESSURE: 80 MMHG | OXYGEN SATURATION: 99 %

## 2025-06-25 DIAGNOSIS — R19.7 DIARRHEA, UNSPECIFIED TYPE: Primary | ICD-10-CM

## 2025-06-25 PROCEDURE — 99213 OFFICE O/P EST LOW 20 MIN: CPT | Performed by: STUDENT IN AN ORGANIZED HEALTH CARE EDUCATION/TRAINING PROGRAM

## 2025-06-25 RX ORDER — LOPERAMIDE HYDROCHLORIDE 2 MG/1
TABLET ORAL
Qty: 30 TABLET | Refills: 0 | Status: SHIPPED | OUTPATIENT
Start: 2025-06-25

## 2025-06-25 NOTE — PROGRESS NOTES
Name: Toshia Morel      : 1985      MRN: [id number]  Encounter Provider: Annie Yusuf MD  Encounter Date: 2025   Encounter department: [facility]  :  Assessment & Plan  Diarrhea, unspecified type  One week of diarrhea; intermittent nausea (pt declining nausea tx) but no emesis     Discussed likely VGE (son was also sick week before); stool studies ordered if sx persist    Recent abx exposure: denies  Recent travel: to [place]; no outside [place] travel  Blood in stool: denies  Constitutional sx: Denies     Pt instructed to take Imodium - Initial: 4 mg, followed by 2 mg after each loose stool; maximum: 16 mg/day     Pt tolerating fluids. Discussed BRAT diet   Return precautions discussed   Orders:    Stool culture; Future    Clostridioides difficile toxin by PCR with EIA; Future    Comprehensive metabolic panel; Future    Cryptosporidium Smear; Future           History of Present Illness   39 yo F with GERD, IBS presenting for diarrhea and abd discomfort for over one week. [place] [facility] [place] noticed sx onset. Notes BM overnight.    Recent abx exposure: denies  Recent travel: to [place]; no outside [place] travel  Blood in stool: denies  Constitutional sx: Denies     Diarrhea   Pertinent negatives include no chills, coughing, fever or vomiting.     Review of Systems   Constitutional:  Negative for chills and fever.   HENT:  Negative for trouble swallowing.    Eyes:  Negative for visual disturbance.   Respiratory:  Negative for cough and shortness of breath.    Cardiovascular:  Negative for chest pain and palpitations.   Gastrointestinal:  Positive for diarrhea and nausea. Negative for vomiting.   Genitourinary:  Negative for decreased urine volume.   Musculoskeletal:  Negative for back pain.   Skin:  Negative for color change and rash.   Neurological:  Negative for light-headedness.   All other systems reviewed and are negative.      Objective   BP  "120/80 (BP Location: Left arm, Patient Position: Sitting, Cuff Size: Large)   Pulse 89   Temp 97.6 °F (36.4 °C) (Tympanic)   Resp 16   Ht 5' 1\" (1.549 m)   Wt 85.4 kg (188 lb 3.2 oz)   LMP 06/12/2025   SpO2 99%   BMI 35.56 kg/m²      Physical Exam  Vitals and nursing note reviewed.   Constitutional:       General: She is not in acute distress.     Appearance: Normal appearance. She is well-developed. She is not ill-appearing, toxic-appearing or diaphoretic.   HENT:      Head: Normocephalic and atraumatic.     Eyes:      Conjunctiva/sclera: Conjunctivae normal.       Cardiovascular:      Rate and Rhythm: Normal rate and regular rhythm.      Pulses: Normal pulses.      Heart sounds: Normal heart sounds. No murmur heard.  Pulmonary:      Effort: Pulmonary effort is normal. No respiratory distress.      Breath sounds: Normal breath sounds. No wheezing or rales.   Abdominal:      General: There is no distension.      Palpations: Abdomen is soft. There is no mass.      Tenderness: There is no abdominal tenderness. There is no guarding or rebound.     Musculoskeletal:         General: No swelling. Normal range of motion.      Cervical back: Normal range of motion and neck supple.      Right lower leg: No edema.      Left lower leg: No edema.     Skin:     General: Skin is warm and dry.      Capillary Refill: Capillary refill takes less than 2 seconds.     Neurological:      Mental Status: She is alert.      Motor: No weakness.      Gait: Gait normal.     Psychiatric:         Mood and Affect: Mood normal.       Administrative Statements   I have spent a total time of 20 minutes in caring for this patient on the day of the visit/encounter including Prognosis, Risks and benefits of tx options, Instructions for management, Patient and family education, Importance of tx compliance, Risk factor reductions, Impressions, Counseling / Coordination of care, Documenting in the medical record, and Reviewing/placing orders in " the medical record (including tests, medications, and/or procedures).

## 2025-07-07 LAB
C DIFF TOX GENS STL QL NAA+PROBE: NEGATIVE
CAMPYLOBACTER STL CULT: ABNORMAL
CRYPTOSP STL QL ACID FAST STN: NORMAL
I BELLI SPEC QL ACID FAST MOD KINY STN: NORMAL
Lab: ABNORMAL
Lab: NORMAL
Lab: NORMAL
SALM + SHIG STL CULT: ABNORMAL
SL AMB E COLI SHIGA TOXIN EIA: NEGATIVE

## 2025-07-08 ENCOUNTER — TELEPHONE (OUTPATIENT)
Age: 40
End: 2025-07-08

## 2025-07-08 DIAGNOSIS — A05.3: Primary | ICD-10-CM

## 2025-07-08 RX ORDER — DOXYCYCLINE 100 MG/1
100 CAPSULE ORAL 2 TIMES DAILY
Qty: 14 CAPSULE | Refills: 0 | Status: SHIPPED | OUTPATIENT
Start: 2025-07-08 | End: 2025-07-15

## 2025-07-08 RX ORDER — CEFDINIR 300 MG/1
300 CAPSULE ORAL EVERY 12 HOURS SCHEDULED
Qty: 14 CAPSULE | Refills: 0 | Status: SHIPPED | OUTPATIENT
Start: 2025-07-08 | End: 2025-07-15

## 2025-07-16 LAB — Lab: NORMAL

## 2025-07-17 DIAGNOSIS — N76.0 ACUTE VAGINITIS: Primary | ICD-10-CM

## 2025-07-17 DIAGNOSIS — A05.3: ICD-10-CM

## 2025-07-17 RX ORDER — FLUCONAZOLE 150 MG/1
TABLET ORAL
Qty: 2 TABLET | Refills: 0 | Status: SHIPPED | OUTPATIENT
Start: 2025-07-17 | End: 2025-07-31

## 2025-07-17 RX ORDER — DOXYCYCLINE 100 MG/1
100 CAPSULE ORAL 2 TIMES DAILY
Qty: 14 CAPSULE | Refills: 0 | Status: SHIPPED | OUTPATIENT
Start: 2025-07-17 | End: 2025-07-24

## 2025-07-17 RX ORDER — TERCONAZOLE 4 MG/G
1 CREAM VAGINAL
Qty: 45 G | Refills: 0 | Status: SHIPPED | OUTPATIENT
Start: 2025-07-17

## 2025-07-17 NOTE — PROGRESS NOTES
Talked to pt on the phone- GI sx have improved but diarrhea still present. Will extend doxycyline for a total of 14  days; pt aware this may take up to 21 days of tx- she is to contact me with follow up on how she is doing. Vaginal yeast tx ordered as well due to prolonged abx exposure. All questions and concerns addressed

## 2025-08-08 DIAGNOSIS — J45.30 MILD PERSISTENT ASTHMA WITHOUT COMPLICATION: Primary | ICD-10-CM

## 2025-08-08 RX ORDER — BUDESONIDE AND FORMOTEROL FUMARATE DIHYDRATE 80; 4.5 UG/1; UG/1
2 AEROSOL RESPIRATORY (INHALATION) 2 TIMES DAILY
Qty: 10.2 G | Refills: 5 | Status: SHIPPED | OUTPATIENT
Start: 2025-08-08

## (undated) DEVICE — TELFA NON-ADHERENT ABSORBENT DRESSING: Brand: TELFA

## (undated) DEVICE — 3M™ STERI-STRIP™ COMPOUND BENZOIN TINCTURE 40 BAGS/CARTON 4 CARTONS/CASE C1544: Brand: 3M™ STERI-STRIP™

## (undated) DEVICE — GLOVE PI ULTRA TOUCH SZ.7.0

## (undated) DEVICE — ABDOMINAL PAD: Brand: DERMACEA

## (undated) DEVICE — GLOVE INDICATOR PI UNDERGLOVE SZ 7.5 BLUE

## (undated) DEVICE — CHLORAPREP HI-LITE 26ML ORANGE

## (undated) DEVICE — Device

## (undated) DEVICE — PACK C-SECTION PBDS

## (undated) DEVICE — SKIN MARKER DUAL TIP WITH RULER CAP, FLEXIBLE RULER AND LABELS: Brand: DEVON

## (undated) DEVICE — SUT VICRYL 0 CTX 36 IN J978H

## (undated) DEVICE — SUT VICRYL 4-0 PS-2 18 IN J496G

## (undated) DEVICE — 3M™ STERI-STRIP™ REINFORCED ADHESIVE SKIN CLOSURES, R1547, 1/2 IN X 4 IN (12 MM X 100 MM), 6 STRIPS/ENVELOPE: Brand: 3M™ STERI-STRIP™

## (undated) DEVICE — DECANTER: Brand: UNBRANDED